# Patient Record
Sex: MALE | Race: WHITE | NOT HISPANIC OR LATINO | Employment: FULL TIME | URBAN - METROPOLITAN AREA
[De-identification: names, ages, dates, MRNs, and addresses within clinical notes are randomized per-mention and may not be internally consistent; named-entity substitution may affect disease eponyms.]

---

## 2017-03-28 ENCOUNTER — NON-PROVIDER VISIT (OUTPATIENT)
Dept: URGENT CARE | Facility: PHYSICIAN GROUP | Age: 32
End: 2017-03-28

## 2017-03-28 DIAGNOSIS — Z02.1 PRE-EMPLOYMENT DRUG SCREENING: ICD-10-CM

## 2017-03-28 LAB
AMP AMPHETAMINE: NORMAL
COC COCAINE: NORMAL
INT CON NEG: NEGATIVE
INT CON POS: POSITIVE
MET METHAMPHETAMINES: NORMAL
OPI OPIATES: NORMAL
PCP PHENCYCLIDINE: NORMAL
POC DRUG COMMENT 753798-OCCUPATIONAL HEALTH: NORMAL
THC: NORMAL

## 2017-03-28 PROCEDURE — 80305 DRUG TEST PRSMV DIR OPT OBS: CPT | Performed by: NURSE PRACTITIONER

## 2017-03-29 NOTE — PROGRESS NOTES
Олегfred Hernandezsergei is a 31 y.o. male here for a non-provider visit for Pre emp UDS    If abnormal was an in office provider notified today (if so, indicate provider)? N/a  Routed to PCP? No

## 2018-05-12 ENCOUNTER — NON-PROVIDER VISIT (OUTPATIENT)
Dept: OCCUPATIONAL MEDICINE | Facility: CLINIC | Age: 33
End: 2018-05-12

## 2018-05-12 ENCOUNTER — HOSPITAL ENCOUNTER (EMERGENCY)
Facility: MEDICAL CENTER | Age: 33
End: 2018-05-12
Attending: EMERGENCY MEDICINE
Payer: COMMERCIAL

## 2018-05-12 VITALS
OXYGEN SATURATION: 98 % | SYSTOLIC BLOOD PRESSURE: 133 MMHG | TEMPERATURE: 99.9 F | DIASTOLIC BLOOD PRESSURE: 81 MMHG | HEIGHT: 68 IN | WEIGHT: 170.19 LBS | RESPIRATION RATE: 18 BRPM | HEART RATE: 107 BPM | BODY MASS INDEX: 25.79 KG/M2

## 2018-05-12 DIAGNOSIS — Z02.1 PRE-EMPLOYMENT DRUG SCREENING: ICD-10-CM

## 2018-05-12 DIAGNOSIS — H10.213 CHEMICAL CONJUNCTIVITIS OF BOTH EYES: ICD-10-CM

## 2018-05-12 DIAGNOSIS — Z02.83 ENCOUNTER FOR DRUG SCREENING: ICD-10-CM

## 2018-05-12 DIAGNOSIS — S05.00XS CORNEAL ABRASION, UNSPECIFIED LATERALITY, SEQUELA: ICD-10-CM

## 2018-05-12 PROCEDURE — 8899 PR URINE 11 PANEL - AFTER HOURS: Performed by: INTERNAL MEDICINE

## 2018-05-12 PROCEDURE — 82075 ASSAY OF BREATH ETHANOL: CPT | Performed by: INTERNAL MEDICINE

## 2018-05-12 PROCEDURE — 99284 EMERGENCY DEPT VISIT MOD MDM: CPT

## 2018-05-12 PROCEDURE — 80305 DRUG TEST PRSMV DIR OPT OBS: CPT | Performed by: INTERNAL MEDICINE

## 2018-05-12 PROCEDURE — 700101 HCHG RX REV CODE 250: Performed by: EMERGENCY MEDICINE

## 2018-05-12 RX ORDER — ERYTHROMYCIN 5 MG/G
OINTMENT OPHTHALMIC ONCE
Status: COMPLETED | OUTPATIENT
Start: 2018-05-12 | End: 2018-05-12

## 2018-05-12 RX ORDER — ERYTHROMYCIN 5 MG/G
1 OINTMENT OPHTHALMIC 3 TIMES DAILY
Qty: 1 TUBE | Refills: 0 | Status: SHIPPED | OUTPATIENT
Start: 2018-05-12 | End: 2018-05-17

## 2018-05-12 RX ORDER — TETRACAINE HYDROCHLORIDE 5 MG/ML
1 SOLUTION OPHTHALMIC ONCE
Status: COMPLETED | OUTPATIENT
Start: 2018-05-12 | End: 2018-05-12

## 2018-05-12 RX ORDER — ESOMEPRAZOLE MAGNESIUM 20 MG
20 CAPSULE,DELAYED RELEASE (ENTERIC COATED) ORAL DAILY
Status: SHIPPED | COMMUNITY
End: 2020-06-03

## 2018-05-12 RX ADMIN — ERYTHROMYCIN: 5 OINTMENT OPHTHALMIC at 16:56

## 2018-05-12 RX ADMIN — FLUORESCEIN SODIUM 1 MG: 1 STRIP OPHTHALMIC at 15:30

## 2018-05-12 RX ADMIN — TETRACAINE HYDROCHLORIDE 1 DROP: 5 SOLUTION OPHTHALMIC at 15:30

## 2018-05-12 ASSESSMENT — LIFESTYLE VARIABLES
TOTAL SCORE: 0
DO YOU DRINK ALCOHOL: YES
EVER HAD A DRINK FIRST THING IN THE MORNING TO STEADY YOUR NERVES TO GET RID OF A HANGOVER: NO
HAVE YOU EVER FELT YOU SHOULD CUT DOWN ON YOUR DRINKING: NO
HAVE PEOPLE ANNOYED YOU BY CRITICIZING YOUR DRINKING: NO
EVER FELT BAD OR GUILTY ABOUT YOUR DRINKING: NO
CONSUMPTION TOTAL: INCOMPLETE
TOTAL SCORE: 0
TOTAL SCORE: 0

## 2018-05-12 ASSESSMENT — ENCOUNTER SYMPTOMS
CHILLS: 0
DIZZINESS: 0
WHEEZING: 0
FEVER: 0
SHORTNESS OF BREATH: 0
COUGH: 0
HEADACHES: 0
EYE REDNESS: 1

## 2018-05-12 ASSESSMENT — PAIN SCALES - GENERAL: PAINLEVEL_OUTOF10: 3

## 2018-05-12 NOTE — ED PROVIDER NOTES
ED Provider Note   5/12/2018  4:51 PM    Means of Arrival: Walk In  History obtained by: patient  Limitations:     CHIEF COMPLAINT  Chief Complaint   Patient presents with   • Foreign Body in Eye     Left eye       HPI  Олег Ramirez is a 32 y.o. male with history of GERD presents with concerns of a work-related injury.  He says he is working for [employer], wearing eye protection, when a bag of powdery substance got into his eyes.  Exposed to BSH-12 powder at work. Rinsed eyes for 15 minutes with eye irrigation on site. PH of BSH-12 is 7 according to [facility].  He said there was not pain initially but felt like there is a grittiness in his eyes.  Eye exam was in his eyes.  He says now he has discomfort as left eye and it is frequently watering.  He says the vision for the most part is normal except for his eyes watering there is some blurriness.  No headache.  No respiratory symptoms.    REVIEW OF SYSTEMS  Review of Systems   Constitutional: Negative for chills and fever.   Eyes: Positive for redness.        See HPI   Respiratory: Negative for cough, shortness of breath and wheezing.    Skin: Negative for rash.   Neurological: Negative for dizziness and headaches.     See HPI for further details.     PAST MEDICAL HISTORY   has a past medical history of GERD (gastroesophageal reflux disease).    SOCIAL HISTORY  Social History     Social History Main Topics   • Smoking status: Current Every Day Smoker     Packs/day: 0.50     Types: Cigarettes   • Smokeless tobacco: Never Used   • Alcohol use Yes      Comment: occasionally   • Drug use: No   • Sexual activity: Not on file       SURGICAL HISTORY  patient denies any surgical history    CURRENT MEDICATIONS  Home Medications     Reviewed by Bahman Gonzalez (Pharmacy Tech) on 05/12/18 at 1623  Med List Status: Complete   Medication Last Dose Status   esomeprazole (NEXIUM) 20 MG capsule 5/10/2018 Active                ALLERGIES  No Known Allergies    PHYSICAL EXAM  VITAL  "SIGNS: /81   Pulse (!) 107   Temp 37.7 °C (99.9 °F)   Resp 18   Ht 1.727 m (5' 8\")   Wt 77.2 kg (170 lb 3.1 oz)   SpO2 98%   BMI 25.88 kg/m²    Pulse ox interpretation: I interpret this pulse ox as normal.  Constitutional: Alert in no apparent distress.  HENT: Normocephalic, Atraumatic, Bilateral external ears normal. Nose normal.   Eyes: Pupils are equal.  Injected conjunctivae bilaterally more so at the left medial eye.R20/30, L20/50.  Slit lamp reveals multiple punctate areas of fluorescein uptake mostly at sclera.  No large areas to suggest ulceration.  Normal eye movements.  No chemosis. PH of eyes = 7.   Heart: Increased rate and rythm, no murmurs.    Lungs: No respiratory distress, regular respirations. Clear to auscultation bilaterally.  Abdomen: Normal appearance, nondistended, nontender.  Skin: Warm, Dry, No erythema, No rash.   Neurologic: Alert, Grossly non-focal. No slurred speech. Moving extremities normally.   Psychiatric: Affect normal, Judgment normal, Mood normal, Appears appropriate and not intoxicated.   Physical Exam      COURSE & MEDICAL DECISION MAKING  Pertinent Labs & Imaging studies reviewed. (See chart for details)    4:51 PM This is an emergent evaluation of a 32 y.o., male who presents with eye pain after exposure to industrial substance at work.  Physical exam significant for bilateral injection.  There is punctate fluorescein uptake bilateral eyes worse at the left than the right.  Differential diagnosis includes abrasion versus chemical conjunctivitis.  Acceptable visual acuity.  No cell or flare.  Erythromycin ointment was applied to eyes.  I also prescribed this to him to take for the next 3 days.  He is provided with information to follow up with occupational health and ophthalmology clinic..      The patient will return for worsening symptoms and is stable at the time of discharge. The patient verbalizes understanding.    FINAL IMPRESSION  1. Chemical conjunctivitis " of both eyes    2. Corneal abrasion, unspecified laterality, sequela               Electronically signed by: Krishan Bell II, 5/12/2018 4:51 PM

## 2018-05-12 NOTE — DISCHARGE INSTRUCTIONS
Corneal Abrasion  The cornea is the clear covering at the front and center of the eye. When looking at the colored portion of the eye (iris), you are looking through the cornea. This very thin tissue is made up of many layers. The surface layer is a single layer of cells (corneal epithelium) and is one of the most sensitive tissues in the body. If a scratch or injury causes the corneal epithelium to come off, it is called a corneal abrasion. If the injury extends to the tissues below the epithelium, the condition is called a corneal ulcer.  CAUSES   · Scratches.  · Trauma.  · Foreign body in the eye.  Some people have recurrences of abrasions in the area of the original injury even after it has healed (recurrent erosion syndrome). Recurrent erosion syndrome generally improves and goes away with time.  SYMPTOMS   · Eye pain.  · Difficulty or inability to keep the injured eye open.  · The eye becomes very sensitive to light.  · Recurrent erosions tend to happen suddenly, first thing in the morning, usually after waking up and opening the eye.  DIAGNOSIS   Your health care provider can diagnose a corneal abrasion during an eye exam. Dye is usually placed in the eye using a drop or a small paper strip moistened by your tears. When the eye is examined with a special light, the abrasion shows up clearly because of the dye.  TREATMENT   · Small abrasions may be treated with antibiotic drops or ointment alone.  · A pressure patch may be put over the eye. If this is done, follow your doctor's instructions for when to remove the patch. Do not drive or use machines while the eye patch is on. Judging distances is hard to do with a patch on.  If the abrasion becomes infected and spreads to the deeper tissues of the cornea, a corneal ulcer can result. This is serious because it can cause corneal scarring. Corneal scars interfere with light passing through the cornea and cause a loss of vision in the involved eye.  HOME CARE  INSTRUCTIONS  · Use medicine or ointment as directed. Only take over-the-counter or prescription medicines for pain, discomfort, or fever as directed by your health care provider.  · Do not drive or operate machinery if your eye is patched. Your ability to  distances is impaired.  · If your health care provider has given you a follow-up appointment, it is very important to keep that appointment. Not keeping the appointment could result in a severe eye infection or permanent loss of vision. If there is any problem keeping the appointment, let your health care provider know.  SEEK MEDICAL CARE IF:   · You have pain, light sensitivity, and a scratchy feeling in one eye or both eyes.  · Your pressure patch keeps loosening up, and you can blink your eye under the patch after treatment.  · Any kind of discharge develops from the eye after treatment or if the lids stick together in the morning.  · You have the same symptoms in the morning as you did with the original abrasion days, weeks, or months after the abrasion healed.  This information is not intended to replace advice given to you by your health care provider. Make sure you discuss any questions you have with your health care provider.  Document Released: 12/15/2001 Document Revised: 09/07/2016 Document Reviewed: 08/25/2014  [employer] Interactive Patient Education © 2017 [employer]  Chemical Conjunctivitis  Chemical conjunctivitis is eye inflammation from exposure to an irritant or chemical substance. This causes the clear membrane that covers the white part of your eye and the inner surface of your eyelid (conjunctiva) to become inflamed. When the blood vessels in the conjunctiva become inflamed, the eye may become red, pink, and itchy.  Chemical conjunctivitis can occur in one or both eyes. It cannot be spread by one person to another person (noncontagious).  CAUSES  This condition is caused by exposure to a chemical substance or irritant, such  as:  · Smoke.  · Chlorine.  · Soap.  · Fumes.  · Air pollution.  RISK FACTORS  This condition is more likely to develop in:  · People who live somewhere with high levels of air pollution.  · People who use swimming pools often.  SYMPTOMS  Symptoms of this condition may include:  · Eye redness.  · Tearing of the eyes.  · Watery eyes.  · Itchy eyes.  · Burning feeling in the eyes.  · Clear drainage from the eyes.  · Swollen eyelids.  · Sensitivity to light.  DIAGNOSIS  Your health care provider can diagnose this condition from your symptoms and medical history. The health care provider will also do a physical exam. If you have drainage from your eyes, it may be tested to rule out other causes of conjunctivitis. Your health care provider may also use a medical instrument that uses magnified light to examine the eyes (slit lamp).   TREATMENT  Treatment for this condition involves carefully flushing the chemical out of your eye. You may also get antiallergy medicines or eye drops to use at home.  HOME CARE INSTRUCTIONS  · Take or apply medicines only as directed by your health care provider.  · Do not touch or rub your eyes.  · Do not wear contact lenses until the inflammation is gone. Wear glasses instead.  · Do not wear eye makeup until the inflammation is gone.  · Apply a cool, clean washcloth to your eye for 10-20 minutes, 3-4 times a day.  · Avoid exposure to the chemical or environment that caused the irritation. Wear eye protection as necessary.  SEEK MEDICAL CARE IF:  · Your symptoms get worse.  · You have pus draining from your eye.  · You have new symptoms.  · You have a fever.  · You have a change in vision.  · You have increasing pain.  This information is not intended to replace advice given to you by your health care provider. Make sure you discuss any questions you have with your health care provider.  Document Released: 09/27/2006 Document Revised: 01/08/2016 Document Reviewed: 09/29/2015  [employer]  Interactive Patient Education © 2017 [employer]

## 2018-05-12 NOTE — ED NOTES
"Pt was working at [employer] about 11am. Had a respirator, safety goggles on. Was emptying a \"powder substance,\" and it splashed in L eye. Pt washed L eye out at eye wash station for 15 minutes prior to arrival. Left eye is red. Pt c/o pain.   "

## 2018-05-13 NOTE — ED NOTES
Discharge information reviewed in detail. Pt verbalized understanding of discharge instructions to follow up with PCP and to return to ER if condition worsens.  Patient educated on one new prescription.   Pt ambulated out of ER in a steady gait.

## 2018-05-24 LAB
AMP AMPHETAMINE: NORMAL
BAR BARBITURATES: NORMAL
BREATH ALCOHOL COMMENT: NORMAL
BZO BENZODIAZEPINES: NORMAL
COC COCAINE: NORMAL
INT CON NEG: NEGATIVE
INT CON POS: POSITIVE
MDMA ECSTASY: NORMAL
MET METHAMPHETAMINES: NORMAL
MTD METHADONE: NORMAL
OPI OPIATES: NORMAL
OXY OXYCODONE: NORMAL
PCP PHENCYCLIDINE: NORMAL
POC BREATHALIZER: 0 PERCENT (ref 0–0.01)
POC URINE DRUG SCREEN OCDRS: NORMAL
THC: NORMAL

## 2018-09-01 ENCOUNTER — NON-PROVIDER VISIT (OUTPATIENT)
Dept: URGENT CARE | Facility: PHYSICIAN GROUP | Age: 33
End: 2018-09-01
Payer: COMMERCIAL

## 2018-09-01 ENCOUNTER — OCCUPATIONAL MEDICINE (OUTPATIENT)
Dept: URGENT CARE | Facility: PHYSICIAN GROUP | Age: 33
End: 2018-09-01
Payer: COMMERCIAL

## 2018-09-01 VITALS
HEIGHT: 69 IN | DIASTOLIC BLOOD PRESSURE: 90 MMHG | OXYGEN SATURATION: 97 % | SYSTOLIC BLOOD PRESSURE: 132 MMHG | BODY MASS INDEX: 25.18 KG/M2 | WEIGHT: 170 LBS | HEART RATE: 121 BPM | TEMPERATURE: 98.6 F
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
## 2018-09-01 DIAGNOSIS — Z02.1 PRE-EMPLOYMENT DRUG SCREENING: ICD-10-CM

## 2018-09-01 DIAGNOSIS — S29.019A STRAIN OF THORACIC REGION, INITIAL ENCOUNTER: ICD-10-CM

## 2018-09-01 LAB
AMP AMPHETAMINE: NORMAL
BAR BARBITURATES: NORMAL
BREATH ALCOHOL COMMENT: NORMAL
BZO BENZODIAZEPINES: NORMAL
COC COCAINE: NORMAL
INT CON NEG: NEGATIVE
INT CON POS: POSITIVE
MDMA ECSTASY: NORMAL
MET METHAMPHETAMINES: NORMAL
MTD METHADONE: NORMAL
OPI OPIATES: NORMAL
OXY OXYCODONE: NORMAL
PCP PHENCYCLIDINE: NORMAL
POC BREATHALIZER: 0 PERCENT (ref 0–0.01)
POC URINE DRUG SCREEN OCDRS: NEGATIVE
THC: NORMAL

## 2018-09-01 PROCEDURE — 99202 OFFICE O/P NEW SF 15 MIN: CPT | Performed by: EMERGENCY MEDICINE

## 2018-09-01 PROCEDURE — 80305 DRUG TEST PRSMV DIR OPT OBS: CPT | Performed by: PHYSICIAN ASSISTANT

## 2018-09-01 PROCEDURE — 82075 ASSAY OF BREATH ETHANOL: CPT | Performed by: PHYSICIAN ASSISTANT

## 2018-09-01 ASSESSMENT — ENCOUNTER SYMPTOMS
FOCAL WEAKNESS: 0
SENSORY CHANGE: 0
NECK PAIN: 0
FEVER: 0

## 2018-09-01 ASSESSMENT — PAIN SCALES - GENERAL: PAINLEVEL: 3=SLIGHT PAIN

## 2018-09-01 NOTE — PATIENT INSTRUCTIONS
Muscle Strain  A muscle strain (pulled muscle) happens when a muscle is stretched beyond normal length. It happens when a sudden, violent force stretches your muscle too far. Usually, a few of the fibers in your muscle are torn. Muscle strain is common in athletes. Recovery usually takes 1-2 weeks. Complete healing takes 5-6 weeks.  Follow these instructions at home:  · Follow the PRICE method of treatment to help your injury get better. Do this the first 2-3 days after the injury:  ¨ Protect. Protect the muscle to keep it from getting injured again.  ¨ Rest. Limit your activity and rest the injured body part.  ¨ Ice. Put ice in a plastic bag. Place a towel between your skin and the bag. Then, apply the ice and leave it on from 15-20 minutes each hour. After the third day, switch to moist heat packs.  ¨ Compression. Use a splint or elastic bandage on the injured area for comfort. Do not put it on too tightly.  ¨ Elevate. Keep the injured body part above the level of your heart.  · Only take medicine as told by your doctor.  · Warm up before doing exercise to prevent future muscle strains.  Contact a doctor if:  · You have more pain or puffiness (swelling) in the injured area.  · You feel numbness, tingling, or notice a loss of strength in the injured area.  This information is not intended to replace advice given to you by your health care provider. Make sure you discuss any questions you have with your health care provider.  Document Released: 09/26/2009 Document Revised: 05/25/2017 Document Reviewed: 07/17/2014  [employer] Interactive Patient Education © 2017 [employer]

## 2018-09-01 NOTE — LETTER
[facility]  [address]  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    Medical Information   Related to Industrial Injury?      Subjective Complaints:  Date of injury: 2018. Injured at work: yes; onset while lifting today. Previous injury: none. Second job: none. Outside activity: none. Contributing medical illness: chronic intermittent mid backaches. Severity: mild, moderate. Prior treatment: none . Location: right mid back. Radiation: none. Numbness/tingling: none. Focal weakness: none. Bowel/bladder dysfunction: none. Fever: none.   Objective Findings: Gen.: Alert and cooperative, no acute distress. Chest: Nontender, symmetric. Back: Tenderness right greater than left mid thoracic periscapular regions. Neurological: Normal strength and sensation extremities. Vascular: Bilateral radial pulses intact. Skin: Warm, dry, intact.   Pre-Existing Condition(s):     Assessment:   Initial Visit    Status:    Permanent Disability:No    Plan: Medication    Diagnostics:      Comments:       Disability Information   Status: Released to Restricted Duty    From:  2018  Through: 2018 Restrictions are: Temporary   Physical Restrictions   Sitting:    Standing:    [place] hrs/day [place] hrs/day   [place] hrs/day Walking:    Climbing:    Pushing:      Pulling:    Other:    Reaching Above Shoulder (L): < or = to 1 hr/day Reaching Above Shoulder (R): < or = 1 hrs/day     Reaching Below  Shoulder (L):    Reaching Below Shoulder (R):      Not to exceed Weight Limits   Carrying(hrs):   Weight Limit(lb): < or = to 10 pounds Lifting(hrs):   Weight  Limit(lb): < or = to 10 pounds   Comments:      Repetitive Actions   Hands: i.e. Fine Manipulations from Grasping:     Feet: i.e. Operating Foot Controls:     Driving / Operate Machinery:     Physician Name: Martinez Damon M.D. Physician Signature: MARTINEZ Chandler M.D. e-Signature: Dr. Kirill Palacios, Medical Director   Clinic Name / Location: [address] [address] Clinic Phone Number: Dept: [phone]   Appointment Time: 2:50 Pm Visit Start Time: 3:17 PM   Check-In Time:  2:55 Pm Visit Discharge Time:  4:10 PM    Original-Treating Physician or Chiropractor    Page 2-Insurer/TPA    Page 3-Employer    Page 4-Employee

## 2018-09-01 NOTE — PROGRESS NOTES
"Subjective:      Олег Ramirez is a 32 y.o. male who presents with Work-Related Injury (Upper back pain/ sharp pains/ carried more than 60 lbs )      Date of injury: 09/01/2018. Injured at work: yes; onset while lifting today. Previous injury: none. Second job: none. Outside activity: none. Contributing medical illness: chronic intermittent mid backaches. Severity: mild, moderate. Prior treatment: none . Location: right mid back. Radiation: none. Numbness/tingling: none. Focal weakness: none. Bowel/bladder dysfunction: none. Fever: none.     HPI    Review of Systems   Constitutional: Negative for fever.   Musculoskeletal: Negative for neck pain.   Skin: Negative for rash.   Neurological: Negative for sensory change and focal weakness.     PMH:  has a past medical history of GERD (gastroesophageal reflux disease).  MEDS:   Current Outpatient Prescriptions:   •  esomeprazole (NEXIUM) 20 MG capsule, Take 20 mg by mouth every day., Disp: , Rfl:   ALLERGIES: No Known Allergies  SURGHX: No past surgical history on file.  SOCHX:  reports that he has been smoking Cigarettes.  He has been smoking about 0.50 packs per day. He has never used smokeless tobacco. He reports that he drinks alcohol. He reports that he does not use drugs.  FH: family history is not on file.       Objective:     /90   Pulse (!) 121   Temp 37 °C (98.6 °F)   Ht 1.753 m (5' 9\")   Wt 77.1 kg (170 lb)   SpO2 97%   BMI 25.10 kg/m²      Physical Exam    Gen.: Alert and cooperative, no acute distress. Chest: Nontender, symmetric. Back: Tenderness right greater than left mid thoracic periscapular regions. Neurological: Normal strength and sensation extremities. Vascular: Bilateral radial pulses intact. Skin: Warm, dry, intact.       Assessment/Plan:     1. Strain of thoracic region, initial encounter  Ice, heat, OTC ibuprofen prn  D39 and C4 completed.      "

## 2018-09-01 NOTE — LETTER
"EMPLOYEE’S CLAIM FOR COMPENSATION/ REPORT OF INITIAL TREATMENT  FORM C-4    EMPLOYEE’S CLAIM - PROVIDE ALL INFORMATION REQUESTED   First Name  Олег Last Name  James Birthdate                    1985                Sex  male Claim Number   Home Address  [address] Age  32 y.o. Height  1.753 m (5' 9\") Weight  77.1 kg (170 lb) 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 (if applicable)  picking up bags    How did this injury or occupational disease occur? (Be specific an answer in detail. Use additional sheet if necessary)  picking up bags wrong way   If you believe that you have an occupational disease, when did you first have knowledge of the disability and it relationship to your employment?   Witnesses to the Accident        Nature of Injury or Occupational Disease  Workers' Compensation  Part(s) of Body Injured or Affected  Upper Back Area (Thoracic Area), N/A, N/A    I certify that the above is true and correct to the best of my knowledge and that I have provided this information in order to obtain the benefits of " [place]  I hereby authorize any physician, chiropractor, surgeon, practitioner, or other person, any hospital, including [facility] or [facility], any medical service organization, any insurance company, or other institution or organization to release to each other, any medical or other information, including benefits paid or payable, pertinent to this injury or disease, except information relative to diagnosis, treatment and/or counseling for AIDS, psychological conditions, alcohol or controlled substances, for which I must give specific authorization.  A Photostat of this authorization shall be as valid as the original.     Date   Place   Employee’s Signature   THIS REPORT MUST BE COMPLETED AND MAILED WITHIN 3 WORKING DAYS OF TREATMENT   Place  [facility]  Name of Facility  [place]   Date  9/1/2018 Diagnosis  (S29.019A) Strain of thoracic region, initial encounter Is there evidence the injured employee was under the influence of alcohol and/or another controlled substance at the time of accident?   Hour  3:17 PM Description of Injury or Disease  The encounter diagnosis was Strain of thoracic region, initial encounter. No   Treatment  Ice, heat, ibuprofen prn  Have you advised the patient to remain off work five days or more? No   X-Ray Findings      If Yes   From Date  To Date      From information given by the employee, together with medical evidence, can you directly connect this injury or occupational disease as job incurred?  Yes If No Full Duty  No Modified Duty  Yes   Is additional medical care by a physician indicated?  Yes If Modified Duty, Specify any Limitations / Restrictions  No bending, no stooping no crouching, weight lifting restriction, limited overhead activity   Do you know of any previous injury or disease contributing to this condition or occupational  "disease?                            Yes  Comments:chronic intermittent back pains   Date  9/1/2018 Print Doctor’s Name Martinez Damon M.D. I certify the employer’s copy of  this form was mailed on:   Address  [address] Insurer’s Use Only     [facility] Zip  [address]    Provider’s Tax ID Number  [id number] Telephone  Dept: [phone]        elva-MARTINEZ Maldonado M.D.   e-Signature: Dr. Kirill Palacios, Medical Director Degree  MD        ORIGINAL-TREATING PHYSICIAN OR CHIROPRACTOR    PAGE 2-INSURER/TPA    PAGE 3-EMPLOYER    PAGE 4-EMPLOYEE             Form C-4 (rev10/07)              BRIEF DESCRIPTION OF RIGHTS AND BENEFITS  (Pursuant to [place] 616C.[place])    Notice of Injury or Occupational Disease (Incident Report Form C-1): If an injury or occupational disease (OD) arises out of and in the  course of employment, you must provide written notice to your employer as soon as practicable, but no later than 7 days after the accident or  OD. Your employer shall maintain a sufficient supply of the required forms.    Claim for Compensation (Form C-4): If medical treatment is sought, the form C-4 is available at the place of initial treatment. A completed  \"Claim for Compensation\" (Form C-4) must be filed within 90 days after an accident or OD. The treating physician or chiropractor must,  within 3 working days after treatment, complete and mail to the employer, the employer's insurer and third-party , the Claim for  Compensation.    Medical Treatment: If you require medical treatment for your on-the-job injury or OD, you may be required to select a physician or  chiropractor from a list provided by your workers’ compensation insurer, if it has contracted with an Organization for Managed Care (MCO) or  Preferred Provider Organization (PPO) or providers of health care. If your employer has not entered into a contract with an MCO or PPO, you  may select a physician or chiropractor from the Panel of " Physicians and Chiropractors. Any medical costs related to your industrial injury or  OD will be paid by your insurer.    Temporary Total Disability (TTD): If your doctor has certified that you are unable to work for a period of at least 5 consecutive days, or 5  cumulative days in a 20-day period, or places restrictions on you that your employer does not accommodate, you may be entitled to TTD  compensation.    Temporary Partial Disability (TPD): If the wage you receive upon reemployment is less than the compensation for TTD to which you are  entitled, the insurer may be required to pay you TPD compensation to make up the difference. TPD can only be paid for a maximum of 24  months.    Permanent Partial Disability (PPD): When your medical condition is stable and there is an indication of a PPD as a result of your injury or  OD, within 30 days, your insurer must arrange for an evaluation by a rating physician or chiropractor to determine the degree of your PPD. The  amount of your PPD award depends on the date of injury, the results of the PPD evaluation and your age and wage.    Permanent Total Disability (PTD): If you are medically certified by a treating physician or chiropractor as permanently and totally disabled  and have been granted a PTD status by your insurer, you are entitled to receive monthly benefits not to exceed 66 2/3% of your average  monthly wage. The amount of your PTD payments is subject to reduction if you previously received a PPD award.    Vocational Rehabilitation Services: You may be eligible for vocational rehabilitation services if you are unable to return to the job due to a  permanent physical impairment or permanent restrictions as a result of your injury or occupational disease.    Transportation and Per Nolan Reimbursement: You may be eligible for travel expenses and per nolan associated with medical treatment.    Reopening: You may be able to reopen your claim if your condition  worsens after claim closure.    Appeal Process: If you disagree with a written determination issued by the insurer or the insurer does not respond to your request, you may  appeal to the Department of Administration, , by following the instructions contained in your determination letter. You must  appeal the determination within 70 days from the date of the determination letter at [address], [address], [place], [place]  [address], or [address], [place], [place] [address]. If you disagree with the  decision, you may appeal to the  Department of Administration, . You must file your appeal within 30 days from the date of the  decision  letter at [address], [address], [place], [place] [address], or [address], [place], [place] [address]. If you  disagree with a decision of an , you may file a petition for judicial review with the District Court. You must do so within 30  days of the Appeal Officer’s decision. You may be represented by an  at your own expense or you may contact the [facility] for possible  representation.    [place]  for Injured Workers (NAIW): If you disagree with a  decision, you may request that NAIW represent you  without charge at an  Hearing. For information regarding denial of benefits, you may contact the [facility] at: [address], [address], [place] [address], [phone], or [address], [place] [address], [phone]    To File a Complaint with the Division: If you wish to file a complaint with the  of the Division of Industrial Relations (DIR),  please contact the Workers’ Compensation Section, [address], [place], [place] [address], telephone [phone]) [phone], or  [address], [place] [address], telephone [phone]  [phone].    For assistance with Workers’ Compensation Issues: you may contact the Office of the Governor Consumer Health Assistance, [address] [place] [address], [address], [place] [address], Toll Free 1-523.698.3173, Web site: http://govcha.[facility].nv., E-mail  Makayla@[email or web address].nv.                                                                                                                                                                                                                                   __________________________________________________________________                                                                   _________________                Employee Name / Signature                                                                                                                                                       Date                                                                                                                                                                                                     D-2 (rev. 10/07)

## 2018-09-14 ENCOUNTER — HOSPITAL ENCOUNTER (INPATIENT)
Facility: MEDICAL CENTER | Age: 33
LOS: 1 days | DRG: 340 | End: 2018-09-16
Attending: EMERGENCY MEDICINE | Admitting: SURGERY
Payer: COMMERCIAL

## 2018-09-14 ENCOUNTER — APPOINTMENT (OUTPATIENT)
Dept: RADIOLOGY | Facility: MEDICAL CENTER | Age: 33
DRG: 340 | End: 2018-09-14
Attending: EMERGENCY MEDICINE
Payer: COMMERCIAL

## 2018-09-14 DIAGNOSIS — G89.18 POST-OPERATIVE PAIN: ICD-10-CM

## 2018-09-14 DIAGNOSIS — K35.30 ACUTE APPENDICITIS WITH LOCALIZED PERITONITIS: ICD-10-CM

## 2018-09-14 LAB
ALBUMIN SERPL BCP-MCNC: 4.1 G/DL (ref 3.2–4.9)
ALBUMIN/GLOB SERPL: 1.3 G/DL
ALP SERPL-CCNC: 56 U/L (ref 30–99)
ALT SERPL-CCNC: 27 U/L (ref 2–50)
ANION GAP SERPL CALC-SCNC: 11 MMOL/L (ref 0–11.9)
APPEARANCE UR: CLEAR
AST SERPL-CCNC: 26 U/L (ref 12–45)
BASOPHILS # BLD AUTO: 0.3 % (ref 0–1.8)
BASOPHILS # BLD: 0.05 K/UL (ref 0–0.12)
BILIRUB SERPL-MCNC: 0.9 MG/DL (ref 0.1–1.5)
BILIRUB UR QL STRIP.AUTO: NEGATIVE
BUN SERPL-MCNC: 11 MG/DL (ref 8–22)
CALCIUM SERPL-MCNC: 9.6 MG/DL (ref 8.4–10.2)
CHLORIDE SERPL-SCNC: 103 MMOL/L (ref 96–112)
CO2 SERPL-SCNC: 26 MMOL/L (ref 20–33)
COLOR UR: YELLOW
CREAT SERPL-MCNC: 1.05 MG/DL (ref 0.5–1.4)
EOSINOPHIL # BLD AUTO: 0.28 K/UL (ref 0–0.51)
EOSINOPHIL NFR BLD: 1.8 % (ref 0–6.9)
ERYTHROCYTE [DISTWIDTH] IN BLOOD BY AUTOMATED COUNT: 41.3 FL (ref 35.9–50)
GLOBULIN SER CALC-MCNC: 3.1 G/DL (ref 1.9–3.5)
GLUCOSE SERPL-MCNC: 145 MG/DL (ref 65–99)
GLUCOSE UR STRIP.AUTO-MCNC: NEGATIVE MG/DL
HCT VFR BLD AUTO: 49.5 % (ref 42–52)
HGB BLD-MCNC: 16.1 G/DL (ref 14–18)
IMM GRANULOCYTES # BLD AUTO: 0.04 K/UL (ref 0–0.11)
IMM GRANULOCYTES NFR BLD AUTO: 0.3 % (ref 0–0.9)
KETONES UR STRIP.AUTO-MCNC: NEGATIVE MG/DL
LEUKOCYTE ESTERASE UR QL STRIP.AUTO: NEGATIVE
LIPASE SERPL-CCNC: 21 U/L (ref 7–58)
LYMPHOCYTES # BLD AUTO: 2.15 K/UL (ref 1–4.8)
LYMPHOCYTES NFR BLD: 14 % (ref 22–41)
MCH RBC QN AUTO: 27.5 PG (ref 27–33)
MCHC RBC AUTO-ENTMCNC: 32.5 G/DL (ref 33.7–35.3)
MCV RBC AUTO: 84.6 FL (ref 81.4–97.8)
MICRO URNS: NORMAL
MONOCYTES # BLD AUTO: 1.28 K/UL (ref 0–0.85)
MONOCYTES NFR BLD AUTO: 8.4 % (ref 0–13.4)
NEUTROPHILS # BLD AUTO: 11.51 K/UL (ref 1.82–7.42)
NEUTROPHILS NFR BLD: 75.2 % (ref 44–72)
NITRITE UR QL STRIP.AUTO: NEGATIVE
NRBC # BLD AUTO: 0 K/UL
NRBC BLD-RTO: 0 /100 WBC
PH UR STRIP.AUTO: 5.5 [PH]
PLATELET # BLD AUTO: 294 K/UL (ref 164–446)
PMV BLD AUTO: 9.8 FL (ref 9–12.9)
POTASSIUM SERPL-SCNC: 3.6 MMOL/L (ref 3.6–5.5)
PROT SERPL-MCNC: 7.2 G/DL (ref 6–8.2)
PROT UR QL STRIP: NEGATIVE MG/DL
RBC # BLD AUTO: 5.85 M/UL (ref 4.7–6.1)
RBC UR QL AUTO: NEGATIVE
SODIUM SERPL-SCNC: 140 MMOL/L (ref 135–145)
SP GR UR STRIP.AUTO: <=1.005
WBC # BLD AUTO: 15.3 K/UL (ref 4.8–10.8)

## 2018-09-14 PROCEDURE — 700101 HCHG RX REV CODE 250: Performed by: EMERGENCY MEDICINE

## 2018-09-14 PROCEDURE — 96375 TX/PRO/DX INJ NEW DRUG ADDON: CPT

## 2018-09-14 PROCEDURE — 0DTJ4ZZ RESECTION OF APPENDIX, PERCUTANEOUS ENDOSCOPIC APPROACH: ICD-10-PCS | Performed by: SURGERY

## 2018-09-14 PROCEDURE — 500371 HCHG DRAIN, BLAKE 10MM: Performed by: SURGERY

## 2018-09-14 PROCEDURE — 700101 HCHG RX REV CODE 250

## 2018-09-14 PROCEDURE — 700111 HCHG RX REV CODE 636 W/ 250 OVERRIDE (IP)

## 2018-09-14 PROCEDURE — 700105 HCHG RX REV CODE 258: Performed by: EMERGENCY MEDICINE

## 2018-09-14 PROCEDURE — 501399 HCHG SPECIMAN BAG, ENDO CATC: Performed by: SURGERY

## 2018-09-14 PROCEDURE — 160048 HCHG OR STATISTICAL LEVEL 1-5: Performed by: SURGERY

## 2018-09-14 PROCEDURE — 501450 HCHG STAPLES, ENDO MULTIFIRE: Performed by: SURGERY

## 2018-09-14 PROCEDURE — 500002 HCHG ADHESIVE, DERMABOND: Performed by: SURGERY

## 2018-09-14 PROCEDURE — 501570 HCHG TROCAR, SEPARATOR: Performed by: SURGERY

## 2018-09-14 PROCEDURE — 500868 HCHG NEEDLE, SURGI(VARES): Performed by: SURGERY

## 2018-09-14 PROCEDURE — 500389 HCHG DRAIN, RESERVOIR SUCT JP 100CC: Performed by: SURGERY

## 2018-09-14 PROCEDURE — 99291 CRITICAL CARE FIRST HOUR: CPT

## 2018-09-14 PROCEDURE — 160028 HCHG SURGERY MINUTES - 1ST 30 MINS LEVEL 3: Performed by: SURGERY

## 2018-09-14 PROCEDURE — 502571 HCHG PACK, LAP CHOLE: Performed by: SURGERY

## 2018-09-14 PROCEDURE — 160002 HCHG RECOVERY MINUTES (STAT): Performed by: SURGERY

## 2018-09-14 PROCEDURE — 88304 TISSUE EXAM BY PATHOLOGIST: CPT

## 2018-09-14 PROCEDURE — 160009 HCHG ANES TIME/MIN: Performed by: SURGERY

## 2018-09-14 PROCEDURE — 501571 HCHG TROCAR, SEPARATOR 12X100: Performed by: SURGERY

## 2018-09-14 PROCEDURE — 501838 HCHG SUTURE GENERAL: Performed by: SURGERY

## 2018-09-14 PROCEDURE — 501568 HCHG TROCAR, BLUNTPORT 12MM: Performed by: SURGERY

## 2018-09-14 PROCEDURE — 700117 HCHG RX CONTRAST REV CODE 255: Performed by: EMERGENCY MEDICINE

## 2018-09-14 PROCEDURE — 96367 TX/PROPH/DG ADDL SEQ IV INF: CPT

## 2018-09-14 PROCEDURE — 80053 COMPREHEN METABOLIC PANEL: CPT

## 2018-09-14 PROCEDURE — 500800 HCHG LAPAROSCOPIC J/L HOOK: Performed by: SURGERY

## 2018-09-14 PROCEDURE — 74177 CT ABD & PELVIS W/CONTRAST: CPT

## 2018-09-14 PROCEDURE — 81003 URINALYSIS AUTO W/O SCOPE: CPT

## 2018-09-14 PROCEDURE — 85025 COMPLETE CBC W/AUTO DIFF WBC: CPT

## 2018-09-14 PROCEDURE — 83690 ASSAY OF LIPASE: CPT

## 2018-09-14 PROCEDURE — 96365 THER/PROPH/DIAG IV INF INIT: CPT

## 2018-09-14 PROCEDURE — 501583 HCHG TROCAR, THRD CAN&SEAL 5X100: Performed by: SURGERY

## 2018-09-14 PROCEDURE — 700111 HCHG RX REV CODE 636 W/ 250 OVERRIDE (IP): Performed by: EMERGENCY MEDICINE

## 2018-09-14 PROCEDURE — 160039 HCHG SURGERY MINUTES - EA ADDL 1 MIN LEVEL 3: Performed by: SURGERY

## 2018-09-14 PROCEDURE — 160035 HCHG PACU - 1ST 60 MINS PHASE I: Performed by: SURGERY

## 2018-09-14 PROCEDURE — 36415 COLL VENOUS BLD VENIPUNCTURE: CPT

## 2018-09-14 RX ORDER — ONDANSETRON 2 MG/ML
4 INJECTION INTRAMUSCULAR; INTRAVENOUS ONCE
Status: COMPLETED | OUTPATIENT
Start: 2018-09-14 | End: 2018-09-14

## 2018-09-14 RX ORDER — MORPHINE SULFATE 10 MG/ML
6 INJECTION, SOLUTION INTRAMUSCULAR; INTRAVENOUS ONCE
Status: COMPLETED | OUTPATIENT
Start: 2018-09-14 | End: 2018-09-14

## 2018-09-14 RX ORDER — SODIUM CHLORIDE 9 MG/ML
1000 INJECTION, SOLUTION INTRAVENOUS ONCE
Status: COMPLETED | OUTPATIENT
Start: 2018-09-14 | End: 2018-09-14

## 2018-09-14 RX ORDER — BUPIVACAINE HYDROCHLORIDE AND EPINEPHRINE 5; 5 MG/ML; UG/ML
INJECTION, SOLUTION PERINEURAL
Status: DISCONTINUED | OUTPATIENT
Start: 2018-09-14 | End: 2018-09-15 | Stop reason: HOSPADM

## 2018-09-14 RX ORDER — KETOROLAC TROMETHAMINE 30 MG/ML
30 INJECTION, SOLUTION INTRAMUSCULAR; INTRAVENOUS ONCE
Status: COMPLETED | OUTPATIENT
Start: 2018-09-14 | End: 2018-09-14

## 2018-09-14 RX ADMIN — SODIUM CHLORIDE 1000 ML: 9 INJECTION, SOLUTION INTRAVENOUS at 20:49

## 2018-09-14 RX ADMIN — KETOROLAC TROMETHAMINE 30 MG: 30 INJECTION, SOLUTION INTRAMUSCULAR at 20:49

## 2018-09-14 RX ADMIN — METRONIDAZOLE 500 MG: 500 INJECTION, SOLUTION INTRAVENOUS at 22:22

## 2018-09-14 RX ADMIN — MORPHINE SULFATE 6 MG: 10 INJECTION INTRAVENOUS at 20:49

## 2018-09-14 RX ADMIN — ONDANSETRON 4 MG: 2 INJECTION INTRAMUSCULAR; INTRAVENOUS at 20:49

## 2018-09-14 RX ADMIN — CEFTRIAXONE 1 G: 1 INJECTION, POWDER, FOR SOLUTION INTRAMUSCULAR; INTRAVENOUS at 21:53

## 2018-09-14 RX ADMIN — IOHEXOL 100 ML: 350 INJECTION, SOLUTION INTRAVENOUS at 21:04

## 2018-09-14 ASSESSMENT — ENCOUNTER SYMPTOMS
BACK PAIN: 0
EYE REDNESS: 0
SEIZURES: 0
BLURRED VISION: 0
NAUSEA: 1
SHORTNESS OF BREATH: 0
CHILLS: 0
SORE THROAT: 0
ABDOMINAL PAIN: 1
HEADACHES: 0
COUGH: 0
VOMITING: 0
NECK PAIN: 0
FEVER: 0
FOCAL WEAKNESS: 0

## 2018-09-14 ASSESSMENT — PAIN SCALES - GENERAL
PAINLEVEL_OUTOF10: 6
PAINLEVEL_OUTOF10: 3
PAINLEVEL_OUTOF10: 0

## 2018-09-15 LAB — PATHOLOGY CONSULT NOTE: NORMAL

## 2018-09-15 PROCEDURE — 770006 HCHG ROOM/CARE - MED/SURG/GYN SEMI*

## 2018-09-15 PROCEDURE — 700105 HCHG RX REV CODE 258: Performed by: SURGERY

## 2018-09-15 PROCEDURE — 700102 HCHG RX REV CODE 250 W/ 637 OVERRIDE(OP): Performed by: SURGERY

## 2018-09-15 PROCEDURE — 700111 HCHG RX REV CODE 636 W/ 250 OVERRIDE (IP)

## 2018-09-15 PROCEDURE — 700111 HCHG RX REV CODE 636 W/ 250 OVERRIDE (IP): Performed by: SURGERY

## 2018-09-15 PROCEDURE — 700101 HCHG RX REV CODE 250

## 2018-09-15 PROCEDURE — A9270 NON-COVERED ITEM OR SERVICE: HCPCS | Performed by: SURGERY

## 2018-09-15 RX ORDER — IBUPROFEN 400 MG/1
800 TABLET ORAL
Status: DISCONTINUED | OUTPATIENT
Start: 2018-09-15 | End: 2018-09-16 | Stop reason: HOSPADM

## 2018-09-15 RX ORDER — OXYCODONE HYDROCHLORIDE 10 MG/1
10 TABLET ORAL
Status: DISCONTINUED | OUTPATIENT
Start: 2018-09-15 | End: 2018-09-16 | Stop reason: HOSPADM

## 2018-09-15 RX ORDER — CALCIUM CARBONATE 500 MG/1
500 TABLET, CHEWABLE ORAL
Status: DISCONTINUED | OUTPATIENT
Start: 2018-09-15 | End: 2018-09-16 | Stop reason: HOSPADM

## 2018-09-15 RX ORDER — SCOLOPAMINE TRANSDERMAL SYSTEM 1 MG/1
1 PATCH, EXTENDED RELEASE TRANSDERMAL
Status: DISCONTINUED | OUTPATIENT
Start: 2018-09-15 | End: 2018-09-16 | Stop reason: HOSPADM

## 2018-09-15 RX ORDER — OMEPRAZOLE 20 MG/1
CAPSULE, DELAYED RELEASE ORAL
Status: ACTIVE
Start: 2018-09-15 | End: 2018-09-15

## 2018-09-15 RX ORDER — ONDANSETRON 2 MG/ML
4 INJECTION INTRAMUSCULAR; INTRAVENOUS EVERY 4 HOURS PRN
Status: DISCONTINUED | OUTPATIENT
Start: 2018-09-15 | End: 2018-09-16 | Stop reason: HOSPADM

## 2018-09-15 RX ORDER — OMEPRAZOLE 20 MG/1
20 CAPSULE, DELAYED RELEASE ORAL DAILY
Status: DISCONTINUED | OUTPATIENT
Start: 2018-09-15 | End: 2018-09-16 | Stop reason: HOSPADM

## 2018-09-15 RX ORDER — OXYCODONE HYDROCHLORIDE 5 MG/1
5 TABLET ORAL
Status: DISCONTINUED | OUTPATIENT
Start: 2018-09-15 | End: 2018-09-16 | Stop reason: HOSPADM

## 2018-09-15 RX ORDER — HALOPERIDOL 5 MG/ML
1 INJECTION INTRAMUSCULAR EVERY 6 HOURS PRN
Status: DISCONTINUED | OUTPATIENT
Start: 2018-09-15 | End: 2018-09-16 | Stop reason: HOSPADM

## 2018-09-15 RX ORDER — DIPHENHYDRAMINE HYDROCHLORIDE 50 MG/ML
25 INJECTION INTRAMUSCULAR; INTRAVENOUS EVERY 6 HOURS PRN
Status: DISCONTINUED | OUTPATIENT
Start: 2018-09-15 | End: 2018-09-16 | Stop reason: HOSPADM

## 2018-09-15 RX ORDER — DEXAMETHASONE SODIUM PHOSPHATE 4 MG/ML
4 INJECTION, SOLUTION INTRA-ARTICULAR; INTRALESIONAL; INTRAMUSCULAR; INTRAVENOUS; SOFT TISSUE
Status: DISCONTINUED | OUTPATIENT
Start: 2018-09-15 | End: 2018-09-16 | Stop reason: HOSPADM

## 2018-09-15 RX ORDER — ACETAMINOPHEN 500 MG
1000 TABLET ORAL EVERY 6 HOURS
Status: DISCONTINUED | OUTPATIENT
Start: 2018-09-15 | End: 2018-09-16 | Stop reason: HOSPADM

## 2018-09-15 RX ADMIN — OXYCODONE HYDROCHLORIDE 5 MG: 5 TABLET ORAL at 01:11

## 2018-09-15 RX ADMIN — ACETAMINOPHEN 1000 MG: 500 TABLET, FILM COATED ORAL at 18:04

## 2018-09-15 RX ADMIN — CEFTRIAXONE SODIUM 2 G: 2 INJECTION, POWDER, FOR SOLUTION INTRAMUSCULAR; INTRAVENOUS at 01:11

## 2018-09-15 RX ADMIN — OXYCODONE HYDROCHLORIDE 10 MG: 10 TABLET ORAL at 12:53

## 2018-09-15 RX ADMIN — ACETAMINOPHEN 1000 MG: 500 TABLET, FILM COATED ORAL at 12:53

## 2018-09-15 RX ADMIN — OXYCODONE HYDROCHLORIDE 10 MG: 10 TABLET ORAL at 18:04

## 2018-09-15 RX ADMIN — OMEPRAZOLE 20 MG: 20 CAPSULE, DELAYED RELEASE ORAL at 05:46

## 2018-09-15 RX ADMIN — OXYCODONE HYDROCHLORIDE 10 MG: 10 TABLET ORAL at 09:57

## 2018-09-15 RX ADMIN — ACETAMINOPHEN 1000 MG: 500 TABLET, FILM COATED ORAL at 01:11

## 2018-09-15 RX ADMIN — ACETAMINOPHEN 1000 MG: 500 TABLET, FILM COATED ORAL at 05:45

## 2018-09-15 RX ADMIN — OXYCODONE HYDROCHLORIDE 10 MG: 10 TABLET ORAL at 06:01

## 2018-09-15 ASSESSMENT — LIFESTYLE VARIABLES
ON A TYPICAL DAY WHEN YOU DRINK ALCOHOL HOW MANY DRINKS DO YOU HAVE: 0
TOTAL SCORE: 0
CONSUMPTION TOTAL: NEGATIVE
TOTAL SCORE: 0
HAVE PEOPLE ANNOYED YOU BY CRITICIZING YOUR DRINKING: NO
HOW MANY TIMES IN THE PAST YEAR HAVE YOU HAD 5 OR MORE DRINKS IN A DAY: 0
HAVE YOU EVER FELT YOU SHOULD CUT DOWN ON YOUR DRINKING: NO
EVER_SMOKED: YES
EVER HAD A DRINK FIRST THING IN THE MORNING TO STEADY YOUR NERVES TO GET RID OF A HANGOVER: NO
EVER FELT BAD OR GUILTY ABOUT YOUR DRINKING: NO
EVER_SMOKED: YES
ALCOHOL_USE: YES
TOTAL SCORE: 0
AVERAGE NUMBER OF DAYS PER WEEK YOU HAVE A DRINK CONTAINING ALCOHOL: 0

## 2018-09-15 ASSESSMENT — PAIN SCALES - GENERAL
PAINLEVEL_OUTOF10: 0
PAINLEVEL_OUTOF10: 5
PAINLEVEL_OUTOF10: 5
PAINLEVEL_OUTOF10: 0
PAINLEVEL_OUTOF10: 2
PAINLEVEL_OUTOF10: 0
PAINLEVEL_OUTOF10: 8
PAINLEVEL_OUTOF10: 6

## 2018-09-15 ASSESSMENT — COPD QUESTIONNAIRES
DURING THE PAST 4 WEEKS HOW MUCH DID YOU FEEL SHORT OF BREATH: NONE/LITTLE OF THE TIME
IN THE PAST 12 MONTHS DO YOU DO LESS THAN YOU USED TO BECAUSE OF YOUR BREATHING PROBLEMS: DISAGREE/UNSURE
COPD SCREENING SCORE: 2
HAVE YOU SMOKED AT LEAST 100 CIGARETTES IN YOUR ENTIRE LIFE: YES
DO YOU EVER COUGH UP ANY MUCUS OR PHLEGM?: NO/ONLY WITH OCCASIONAL COLDS OR INFECTIONS

## 2018-09-15 ASSESSMENT — PATIENT HEALTH QUESTIONNAIRE - PHQ9
1. LITTLE INTEREST OR PLEASURE IN DOING THINGS: NOT AT ALL
2. FEELING DOWN, DEPRESSED, IRRITABLE, OR HOPELESS: NOT AT ALL
SUM OF ALL RESPONSES TO PHQ9 QUESTIONS 1 AND 2: 0

## 2018-09-15 NOTE — ED NOTES
[facility] fall assessment completed. Pt is High risk for fall. Interventions complete. Pt placed in yellow non slip socks, wrist band placed, green sign on door. Bed locked in low position, call light in place. Personal possessions in place. Personal needs assessed. Charge nurse notified to move pt to a more visible room if available. Safety assessed. Will monitor frequently.  Patient transported to imaging

## 2018-09-15 NOTE — ED NOTES
Patient BiB self for generalized abdominal pain for 2 days. Pain comes and goes and feels like stabbing. Denies vomiting or bowel changes.

## 2018-09-15 NOTE — CONSULTS
"General Surgery Consult    CHIEF COMPLAINT: Abdominal pain.     HISTORY OF PRESENT ILLNESS: The patient is a 32 y.o. male, who presents with abdominal pain.  He has had approximately 2 days of worsening abdominal pain.  It became so severe that he presented to the emergency department.  In the emergency department he underwent a workup to include a CT scan concerning for appendicitis as well as an elevated white blood cell count.  There is an inflammatory change near the cecum in the suprapubic region.  No definitive appendix is seen.  He describes pain that is below the umbilicus and worse with movement.  General surgery was consulted for evaluation and management of this problem.     PAST MEDICAL HISTORY:  has a past medical history of GERD (gastroesophageal reflux disease).     PAST SURGICAL HISTORY: patient denies any surgical history     ALLERGIES: No Known Allergies     CURRENT MEDICATIONS:   Home Medications    **Home medications have not yet been reviewed for this encounter**         FAMILY HISTORY: No family history on file.     SOCIAL HISTORY:   Social History     Social History Main Topics   • Smoking status: Current Every Day Smoker     Packs/day: 0.50     Types: Cigarettes   • Smokeless tobacco: Never Used   • Alcohol use Yes      Comment: occasionally   • Drug use: No   • Sexual activity: Not on file       REVIEW OF SYSTEMS: Comprehensive review of systems was negative aside from the above HPI.     PHYSICAL EXAMINATION:     GENERAL: The patient is in no acute distress.   VITAL SIGNS: Blood pressure 123/86, pulse (!) 120, temperature 36.8 °C (98.3 °F), resp. rate 18, height 1.753 m (5' 9\"), weight 76.9 kg (169 lb 8.5 oz), SpO2 92 %.  HEAD AND NECK: Demonstrates symmetric, reactive pupils. Extraocular muscles   are intact. Nares and oropharynx are clear.   NECK: Supple. No adenopathy.  CHEST:No respiratory distress.    CARDIOVASCULAR: Tachycardic. The extremities are well perfused.   ABDOMEN: Tender to " palpation just below the umbilicus.  He has localized guarding without evidence of peritonitis.  Thin abdomen.   EXTREMITIES: Examination of the upper and lower extremities demonstrates no cyanosis edema or clubbing.  NEUROLOGIC: Alert & oriented x 3, Normal motor function, Normal sensory function, No focal deficits noted.    LABORATORY VALUES:   Recent Labs      09/14/18 2000   WBC  15.3*   RBC  5.85   HEMOGLOBIN  16.1   HEMATOCRIT  49.5   MCV  84.6   MCH  27.5   MCHC  32.5*   RDW  41.3   PLATELETCT  294   MPV  9.8     Recent Labs      09/14/18 2000   SODIUM  140   POTASSIUM  3.6   CHLORIDE  103   CO2  26   GLUCOSE  145*   BUN  11   CREATININE  1.05   CALCIUM  9.6     Recent Labs      09/14/18 2000   ASTSGOT  26   ALTSGPT  27   TBILIRUBIN  0.9   ALKPHOSPHAT  56   GLOBULIN  3.1            IMAGING:   CT-ABDOMEN-PELVIS WITH   Final Result      Localized inflammatory process in the right anterior pelvic cavity as described above, most likely representing appendicitis with possible poorly demarcated appendiceal abscess. Localized inflammatory bowel disease (eg Crohn's disease), would also be    possible, but is considered less likely. These findings were discussed with LYLE LUO on 9/14/2018 at 2120 hours.          IMPRESSION AND PLAN:     1.  Acute appendicitis.    1.  The patient will be taken to the operating room for a laparoscopic appendectomy. The surgical conduct was explained. Potential complications including but not limited to infection, bleeding, damage to adjacent structures, anesthetic complications were discussed in detail. Questions were elicited and answered to his satisfaction. He understands the rationale for surgery and elects to proceed.  Operative consent signed.  _      ___________________________________   Baljit Mondragon M.D.    DD: 9/14/2018 DT: 10:43 PM

## 2018-09-15 NOTE — PROGRESS NOTES
Surgery  POD#1  C/O pain  Tachycardia improving  Drain sero sanguinous  A/P  1.  Continue abx  2.  Recheck wbc in am  3.  Mobilize  4.  Diet as tolerated.

## 2018-09-15 NOTE — OR SURGEON
Post OP Note    PreOp Diagnosis: Acute appendicitis    PostOp Diagnosis: Severe appendicitis    Procedure(s):  APPENDECTOMY LAPAROSCOPIC - Wound Class: Dirty or Infected    Surgeon(s):  Baljit Mondragon M.D.    Anesthesiologist/Type of Anesthesia:  Anesthesiologist: Luis F Wilson M.D./General    Surgical Staff:  Circulator: Elaine Chowdhury R.N.  Relief Scrub: Ramsey William  Scrub Person: Cierra GISELLA Onisalisa    Specimens removed if any:  Appendix    Estimated Blood Loss: 40 cc s    Findings: Severe inflammation from acute appendicitis    Complications: No complications were noted    Indication: Patient is an otherwise healthy 32-year-old male who presented with abdominal pain and a CT scan consistent with acute appendicitis.  The patient was counseled extensively as to the risks versus benefits of surgery and agreed to proceed fully informed.    Description of the procedure:    The patient was prepped and draped in the standard sterile surgical fashion after induction of general anesthesia.  Appropriate timeout had been performed and antibiotics delivered.  A periumbilical verres insertion was performed and the abdomen was insufflated to 15 mmHg CO2.  A 5 mm Visiport was applied.  A suprapubic 5 mm trocar and a subxiphoid 12 mm trocar placed under direct visualization.    The appendix was located in its right lower quadrant position.  It was dilated and inflamed but not perforated.  A Harmonic scalpel was used to carefully take the mesoappendix down to the base of the appendix.  The appendiceal mesentery was severely inflamed.  A 35 mm vascular load stapler was used to transect the appendix at its base.  I assured that it was the base by following the tenae to the junction of the appendix.  An Endo Catch was used to retrieve the appendix via the subxiphoid incision.    Right lower quadrant was irrigated until clear.  The staple line was intact.  Hemostasis was meticulously achieved.  I did elect to place a 10  Palauan drain due to the severity of the inflammation.  An Endo Close device was used to close the fascia of the subxiphoid trocar.  Monocryl was used for skin edges.  Dermabond was applied as a dressing.  The patient was extubated to recovery in satisfactory condition.      Disposition: The patient will be admitted overnight for IV antibiotics and observation.    9/14/2018 11:59 PM Baljit Mondragon M.D.

## 2018-09-15 NOTE — PROGRESS NOTES
0550 pt got up to the bathroom, assessed gait and mobility.SBA gait is steady. Able to void 320ml. NANCY drained with 30ml. PRN pain medication given. Ensure safety measures in place.

## 2018-09-15 NOTE — CARE PLAN
Problem: Safety  Goal: Will remain free from injury  Outcome: PROGRESSING AS EXPECTED  Assess pt's level of consciousness, instruct pt on how to use his call light. Ensure safety measures are in place, call light must be within pt's reach, has non skid socks on, upper bed rails up and room is well lit free from clutter/spill    Problem: Venous Thromboembolism (VTW)/Deep Vein Thrombosis (DVT) Prevention:  Goal: Patient will participate in Venous Thrombosis (VTE)/Deep Vein Thrombosis (DVT)Prevention Measures    Intervention: Ensure patient wears graduated elastic stockings (RAJINDER hose) and/or SCDs, if ordered, when in bed or chair (Remove at least once per shift for skin check)  Post/op lap appendectomy, explain the importance of wearing SCD's. Pt agreed to put it on and understood explanation.

## 2018-09-15 NOTE — PROGRESS NOTES
2 RN skin check done with RN shelley pt has a NANCY in place, serosanguinous fluid present about 20ml. 2 lap site from laparoscopic appendectomy that looks dry and clean. No drainage observed. No other skin issue observed.

## 2018-09-15 NOTE — DISCHARGE PLANNING
Care Transition Team Assessment  This writer spoke to pt at bedside who reports he will discharge home and has no discharge needs.    Information Source  Orientation : Oriented x 4  Information Given By: Patient  Informant's Name: Олег         Elopement Risk  Legal Hold: No  Ambulatory or Self Mobile in Wheelchair: No-Not an Elopement Risk  Elopement Risk: Not at Risk for Elopement    Interdisciplinary Discharge Planning  Primary Care Physician:  (none- declined assistance to arrange for a PCP)  Lives with - Patient's Self Care Capacity: Parents  Patient or legal guardian wants to designate a caregiver (see row info): No  Support Systems: Parent  Housing / Facility: [address]  Do You Take your Prescribed Medications Regularly: Yes  Able to Return to Previous ADL's: Yes  Mobility Issues: No  Prior Services: None  Patient Expects to be Discharged to:: home  Assistance Needed: No  Durable Medical Equipment: Not Applicable    Discharge Preparedness  What is your plan after discharge?: Home with help  What are your discharge supports?: Parent  Prior Functional Level: Independent with Activities of Daily Living    Functional Assesment  Prior Functional Level: Independent with Activities of Daily Living    Finances  Financial Barriers to Discharge: No  Prescription Coverage: Yes    Vision / Hearing Impairment  Vision Impairment : No  Hearing Impairment : No    Values / Beliefs / Concerns  Values / Beliefs Concerns : No  Special Hospitalization Concerns: lenght of hospital stay    Advance Directive  Advance Directive?: None    Domestic Abuse  Have you ever been the victim of abuse or violence?: No  Physical Abuse or Sexual Abuse: No  Verbal Abuse or Emotional Abuse: No  Possible Abuse Reported to:: Not Applicable    Psychological Assessment  History of Substance Abuse: None  History of Psychiatric Problems: No  Non-compliant with Treatment: No  Newly Diagnosed Illness: Yes    Discharge Risks or Barriers  Discharge  risks or barriers?: No PCP    Anticipated Discharge Information  Anticipated discharge disposition: Home

## 2018-09-15 NOTE — ED PROVIDER NOTES
ED Provider Note    CHIEF COMPLAINT  Chief Complaint   Patient presents with   • Abdominal Pain       HPI  Олег Ramirez is a 32 y.o. male with history of GERD, who presents with abdominal pain. Patient endorses intermittent suprapubic abdominal pain which has been ongoing for the last 2 days. He describes the pain as sharp without significant radiation. He states it is worse with movement.  Reports some associated nausea, no vomiting.  No known fevers. He has had normal bowel movements, no blood in stool.  No associated dysuria, hematuria, testicular pain or swelling.  He has not taken any medications prior to coming in for evaluation. No history of prior abdominal surgeries.        REVIEW OF SYSTEMS  See HPI for further details.   Review of Systems   Constitutional: Negative for chills and fever.   HENT: Negative for sore throat.    Eyes: Negative for blurred vision and redness.   Respiratory: Negative for cough and shortness of breath.    Cardiovascular: Negative for chest pain and leg swelling.   Gastrointestinal: Positive for abdominal pain and nausea. Negative for vomiting.   Genitourinary: Negative for dysuria and urgency.   Musculoskeletal: Negative for back pain and neck pain.   Skin: Negative for rash.   Neurological: Negative for focal weakness, seizures and headaches.   Psychiatric/Behavioral: Negative for suicidal ideas.         PAST MEDICAL HISTORY   has a past medical history of GERD (gastroesophageal reflux disease).    SOCIAL HISTORY  Social History     Social History Main Topics   • Smoking status: Current Every Day Smoker     Packs/day: 0.50     Types: Cigarettes   • Smokeless tobacco: Never Used   • Alcohol use Yes      Comment: occasionally   • Drug use: No   • Sexual activity: Not on file       SURGICAL HISTORY  patient denies any surgical history    CURRENT MEDICATIONS  Home Medications    **Home medications have not yet been reviewed for this encounter**         ALLERGIES  No Known  "Allergies    PHYSICAL EXAM   VITAL SIGNS: /83   Pulse (!) 120   Temp 37.1 °C (98.8 °F)   Resp 20   Ht 1.753 m (5' 9\")   Wt 76.9 kg (169 lb 8.5 oz)   SpO2 91%   BMI 25.04 kg/m²      Physical Exam   Constitutional: He is oriented to person, place, and time and well-developed, well-nourished, and in no distress. No distress.   Uncomfortable however nontoxic appearing   HENT:   Head: Normocephalic and atraumatic.   Eyes: Pupils are equal, round, and reactive to light. Conjunctivae are normal.   Neck: Normal range of motion. Neck supple.   Cardiovascular: Regular rhythm and normal heart sounds.    Tachycardic   Pulmonary/Chest: Effort normal and breath sounds normal. No respiratory distress.   Abdominal: Soft. He exhibits no distension. There is tenderness. There is guarding. There is no rebound.   Diffuse lower abdominal tenderness to palpation with voluntary guarding, no rebound   Musculoskeletal: Normal range of motion. He exhibits no edema or tenderness.   Neurological: He is alert and oriented to person, place, and time.   Moving all extremities spontaneously   Skin: Skin is warm and dry.   Psychiatric: Affect normal.         DIAGNOSTIC STUDIES      LABS  Personally reviewed by me  Labs Reviewed   CBC WITH DIFFERENTIAL - Abnormal; Notable for the following:        Result Value    WBC 15.3 (*)     MCHC 32.5 (*)     Neutrophils-Polys 75.20 (*)     Lymphocytes 14.00 (*)     Neutrophils (Absolute) 11.51 (*)     Monos (Absolute) 1.28 (*)     All other components within normal limits   COMP METABOLIC PANEL - Abnormal; Notable for the following:     Glucose 145 (*)     All other components within normal limits   LIPASE   URINALYSIS,CULTURE IF INDICATED   ESTIMATED GFR   HISTOLOGY REQUEST    Narrative:     a- appendix           RADIOLOGY  Personally reviewed by me  CT-ABDOMEN-PELVIS WITH   Final Result      Localized inflammatory process in the right anterior pelvic cavity as described above, most likely " representing appendicitis with possible poorly demarcated appendiceal abscess. Localized inflammatory bowel disease (eg Crohn's disease), would also be    possible, but is considered less likely. These findings were discussed with LYLE LUO on 9/14/2018 at 2120 hours.            ED COURSE  Vitals:    09/14/18 2206 09/15/18 0011 09/15/18 0025 09/15/18 0040   BP:  145/53 131/89 134/83   Pulse: (!) 120      Resp:  20 16 20   Temp:  36.7 °C (98.1 °F)  37.1 °C (98.8 °F)   SpO2: 92% 94% 91% 91%   Weight:       Height:             Medications administered:  IVF, morphine, Zofran, Toradol, metronidazole, ceftriaxone    Patient was treated with IV fluids for tachycardia and possible dehydration    MEDICAL DECISION MAKING  Otherwise healthy patient presents with 2 day history of lower abdominal pain. He is tachycardic with otherwise reassuring vitals on arrival. Exam not concerning for bowel obstruction or perforation or cholecystitis. Labs demonstrate evidence of significant leukocytosis however no other significant abnormalities. Urinalysis is negative for infection or blood to suggest nephrolithiasis. Imaging demonstrates a localized inflammatory process in the right lower quadrant and no confirmed normal appendix. Discussed this with radiology who believes this is likely acute appendicitis until proven otherwise. Although it may be due to inflammatory bowel disease, he believes that this is less likely.    I discussed the case with Dr. Mondragon, general surgeon on call. He agrees that we should treat this as acute appendicitis. Patient will be given IV antibiotics and taken to the operating room tonight for appendectomy. I have updated the patient on the plan of care and he is agreeable. He is stable at this time for transfer to the OR      IMPRESSION  (K35.3) Acute appendicitis with localized peritonitis    Disposition: Transfer to OR, guarded condition  Results, diagnoses, and treatment options were discussed with  the patient and/or family. Patient verbalized understanding of plan of care.    Patient referred to primary care provider for monitoring and treatment of blood pressure.      Current Discharge Medication List              Electronically signed by: Kaylen Kirby, 9/14/2018 8:32 PM

## 2018-09-15 NOTE — OR NURSING
0011 Pt to PACU from OR via hospital bed, respirations spontaneous and non-labored via room air. Abdominal  surgical sites clean, dry and intact, pt arousable on calling with no complaints of pain or nausea.   0025 Pt reports 1/10 pain and denies nausea.   0040 Pt still denies nausea and reports tolerable 1/10 pain; pt meets criteria for transfer to hospital room.   0043 Report to ANAIS Mix.   0048 Pt transported to hospital room.

## 2018-09-15 NOTE — PROGRESS NOTES
Pt resting in bed. No signs of distress noted. VSS. ABD soft, tender. DSGs CDI. Pain tolerable. Discussed POC. Will monitor.

## 2018-09-15 NOTE — PROGRESS NOTES
0030 Received report from ANAIS Johnson. POC discussed, awaiting pt on floor.    0100 Received pt on floor via gurney, alert and oriented, rated pain at 2/10 when lying in bed and 5 when moving/coughing. Discussed POC such as medication, activity and diet. Encouraged/instruct on how to use IS. Placed SCD in BLE. Ensure safety measures are in place.

## 2018-09-15 NOTE — OR NURSING
Pt pre-op completed in recovery, H&P verified, consent for surgery signed, questions answered, IV checked for patency, pt awaiting surgery.

## 2018-09-15 NOTE — PROGRESS NOTES
"Pt up to recliner chair, however continues to refuse offer for a hallway walk. This Rn has discussed importance of ambulation. He says \"i'll go later.\"   "

## 2018-09-16 VITALS
HEIGHT: 69 IN | DIASTOLIC BLOOD PRESSURE: 51 MMHG | SYSTOLIC BLOOD PRESSURE: 99 MMHG | WEIGHT: 169.53 LBS | TEMPERATURE: 98.1 F | HEART RATE: 106 BPM | OXYGEN SATURATION: 93 % | BODY MASS INDEX: 25.11 KG/M2 | RESPIRATION RATE: 18 BRPM

## 2018-09-16 LAB
ERYTHROCYTE [DISTWIDTH] IN BLOOD BY AUTOMATED COUNT: 42.1 FL (ref 35.9–50)
HCT VFR BLD AUTO: 43 % (ref 42–52)
HGB BLD-MCNC: 13.9 G/DL (ref 14–18)
MCH RBC QN AUTO: 27.9 PG (ref 27–33)
MCHC RBC AUTO-ENTMCNC: 32.3 G/DL (ref 33.7–35.3)
MCV RBC AUTO: 86.2 FL (ref 81.4–97.8)
PLATELET # BLD AUTO: 222 K/UL (ref 164–446)
PMV BLD AUTO: 9.9 FL (ref 9–12.9)
RBC # BLD AUTO: 4.99 M/UL (ref 4.7–6.1)
WBC # BLD AUTO: 11.7 K/UL (ref 4.8–10.8)

## 2018-09-16 PROCEDURE — 36415 COLL VENOUS BLD VENIPUNCTURE: CPT

## 2018-09-16 PROCEDURE — A9270 NON-COVERED ITEM OR SERVICE: HCPCS

## 2018-09-16 PROCEDURE — 700102 HCHG RX REV CODE 250 W/ 637 OVERRIDE(OP): Performed by: SURGERY

## 2018-09-16 PROCEDURE — A9270 NON-COVERED ITEM OR SERVICE: HCPCS | Performed by: SURGERY

## 2018-09-16 PROCEDURE — 700111 HCHG RX REV CODE 636 W/ 250 OVERRIDE (IP): Performed by: SURGERY

## 2018-09-16 PROCEDURE — 85027 COMPLETE CBC AUTOMATED: CPT

## 2018-09-16 PROCEDURE — 700105 HCHG RX REV CODE 258: Performed by: SURGERY

## 2018-09-16 PROCEDURE — 700102 HCHG RX REV CODE 250 W/ 637 OVERRIDE(OP)

## 2018-09-16 RX ORDER — AMOXICILLIN AND CLAVULANATE POTASSIUM 875; 125 MG/1; MG/1
1 TABLET, FILM COATED ORAL 2 TIMES DAILY
Qty: 14 TAB | Refills: 0 | Status: SHIPPED | OUTPATIENT
Start: 2018-09-16 | End: 2020-05-20

## 2018-09-16 RX ORDER — OXYCODONE HYDROCHLORIDE 5 MG/1
5 TABLET ORAL
Qty: 20 TAB | Refills: 0 | Status: SHIPPED | OUTPATIENT
Start: 2018-09-16 | End: 2018-09-20

## 2018-09-16 RX ORDER — OMEPRAZOLE 20 MG/1
CAPSULE, DELAYED RELEASE ORAL
Status: COMPLETED
Start: 2018-09-16 | End: 2018-09-16

## 2018-09-16 RX ADMIN — ACETAMINOPHEN 1000 MG: 500 TABLET, FILM COATED ORAL at 00:15

## 2018-09-16 RX ADMIN — ACETAMINOPHEN 1000 MG: 500 TABLET, FILM COATED ORAL at 05:13

## 2018-09-16 RX ADMIN — OMEPRAZOLE 20 MG: 20 CAPSULE, DELAYED RELEASE ORAL at 05:19

## 2018-09-16 RX ADMIN — CEFTRIAXONE SODIUM 2 G: 2 INJECTION, POWDER, FOR SOLUTION INTRAMUSCULAR; INTRAVENOUS at 05:13

## 2018-09-16 ASSESSMENT — COGNITIVE AND FUNCTIONAL STATUS - GENERAL
DAILY ACTIVITIY SCORE: 24
MOBILITY SCORE: 24
SUGGESTED CMS G CODE MODIFIER DAILY ACTIVITY: CH
SUGGESTED CMS G CODE MODIFIER MOBILITY: CH

## 2018-09-16 ASSESSMENT — PAIN SCALES - GENERAL
PAINLEVEL_OUTOF10: 1
PAINLEVEL_OUTOF10: 1

## 2018-09-16 NOTE — DISCHARGE INSTRUCTIONS
1.   The pain medication is extremely constipating.    Take a stool softener and drink lots of water.     2.   Alternating ice and heat for 30 minutes can greatly reduce your pain.     3.   It's ok to shower on the day after your surgery.   Do not scrub the incisions.     4.   After laparoscopy, it's common to have shoulder pain or pain when you take a deep breath.   If the pain radiates down your arm, call or present to the ER.     5.   Please call for redness or drainage from the wound sites that persists.     6.   Feel free to call with questions at [phone].   If you have paperwork that needs filling out, please contact us at this number.     7.   Follow up with me in 1-2 weeks for your postoperative exam.     8.   Activity restrictions vary according to the surgery.   If it hurts, don't do it.   You may begin light exercise at 7-10 days.     Baljit Mondragon MD FACS   [facility]      Discharge Instructions    Discharged to home by car with relative. Discharged via wheelchair, hospital escort: Yes.  Special equipment needed: Not Applicable    Be sure to schedule a follow-up appointment with your primary care doctor or any specialists as instructed.     Discharge Plan:   Diet Plan: Discussed  Activity Level: Discussed  Smoking Cessation Offered: Patient Refused  Confirmed Follow up Appointment: Patient to Call and Schedule Appointment  Confirmed Symptoms Management: Discussed  Medication Reconciliation Updated: Yes  Influenza Vaccine Indication: Patient Refuses    I understand that a diet low in cholesterol, fat, and sodium is recommended for good health. Unless I have been given specific instructions below for another diet, I accept this instruction as my diet prescription.   Other diet: Regular    Special Instructions: None    · Is patient discharged on Warfarin / Coumadin?   No     Depression / Suicide Risk    As you are discharged from this Critical access hospital facility, it is important to learn how  to keep safe from harming yourself.    Recognize the warning signs:  · Abrupt changes in personality, positive or negative- including increase in energy   · Giving away possessions  · Change in eating patterns- significant weight changes-  positive or negative  · Change in sleeping patterns- unable to sleep or sleeping all the time   · Unwillingness or inability to communicate  · Depression  · Unusual sadness, discouragement and loneliness  · Talk of wanting to die  · Neglect of personal appearance   · Rebelliousness- reckless behavior  · Withdrawal from people/activities they love  · Confusion- inability to concentrate     If you or a loved one observes any of these behaviors or has concerns about self-harm, here's what you can do:  · Talk about it- your feelings and reasons for harming yourself  · Remove any means that you might use to hurt yourself (examples: pills, rope, extension cords, firearm)  · Get professional help from the community (Mental Health, Substance Abuse, psychological counseling)  · Do not be alone:Call your Safe Contact- someone whom you trust who will be there for you.  · Call your local CRISIS HOTLINE 453-5922 or 694-741-8958  · Call your local Children's Mobile Crisis Response Team [place] (502) 723-0913 or www.BuyWithMe  · Call the toll free National Suicide Prevention Hotlines   · National Suicide Prevention Lifeline 563-605-RMCL (5748)  · National Hope Line Network 800-SUICIDE (842-3984)      Laparoscopic Appendectomy, Adult, Care After  Refer to this sheet in the next few weeks. These instructions provide you with information on caring for yourself after your procedure. Your caregiver may also give you more specific instructions. Your treatment has been planned according to current medical practices, but problems sometimes occur. Call your caregiver if you have any problems or questions after your procedure.  HOME CARE INSTRUCTIONS  · Do not drive while taking narcotic pain  medicines.  · Use stool softener if you become constipated from your pain medicines.  · Change your bandages (dressings) as directed.  · Keep your wounds clean and dry. You may wash the wounds gently with soap and water. Gently pat the wounds dry with a clean towel.  · Do not take baths, swim, or use hot tubs for 10 days, or as instructed by your caregiver.  · Only take over-the-counter or prescription medicines for pain, discomfort, or fever as directed by your caregiver.  · You may continue your normal diet as directed.  · Do not lift more than 10 pounds (4.5 kg) or play contact sports for 3 weeks, or as directed.  · Slowly increase your activity after surgery.  · Take deep breaths to avoid getting a lung infection (pneumonia).  SEEK MEDICAL CARE IF:  · You have redness, swelling, or increasing pain in your wounds.  · You have pus coming from your wounds.  · You have drainage from a wound that lasts longer than 1 day.  · You notice a bad smell coming from the wounds or dressing.  · Your wound edges break open after stitches (sutures) have been removed.  · You notice increasing pain in the shoulders (shoulder strap areas) or near your shoulder blades.  · You develop dizzy episodes or fainting while standing.  · You develop shortness of breath.  · You develop persistent nausea or vomiting.  · You cannot control your bowel functions or lose your appetite.  · You develop diarrhea.  SEEK IMMEDIATE MEDICAL CARE IF:   · You have a fever.  · You develop a rash.  · You have difficulty breathing or sharp pains in your chest.  · You develop any reaction or side effects to medicines given.  MAKE SURE YOU:  · Understand these instructions.  · Will watch your condition.  · Will get help right away if you are not doing well or get worse.     This information is not intended to replace advice given to you by your health care provider. Make sure you discuss any questions you have with your health care provider.     Document  Released: 12/18/2006 Document Revised: 05/03/2016 Document Reviewed: 06/26/2012  Elsevier Interactive Patient Education ©2016 [employer]

## 2018-09-16 NOTE — PROGRESS NOTES
Patient refuse use of IS, states he is taking deep breaths on own. Education provided, will continue to monitor.

## 2018-09-16 NOTE — CARE PLAN
Problem: Communication  Goal: The ability to communicate needs accurately and effectively will improve  Outcome: PROGRESSING AS EXPECTED    Intervention: [place] patient and significant other/support system to call light to alert staff of needs  Patient oriented to surroundings and unit policies/routines.  Educated and understands the use of the call button.  Patient calls appropriately.      Problem: Safety  Goal: Will remain free from falls  Outcome: PROGRESSING AS EXPECTED    Intervention: Implement fall precautions  Patient educated and understands the fall precautions in place to prevent falls.  Bed alarm is off, patient calls appropriately, IV pole closest to bathroom, treaded slipper socks on, and bedrails closest to bathroom down.  Patient also educated and understands the use of the call button for any assistance with mobility.

## 2018-09-16 NOTE — PROGRESS NOTES
Bedside report completed.  Assumed pt care. Patient in bed, resting, in no apparent distress.  Safety precautions in place. Call light & personal belongings within reach.  Plan of care discussed.  Patient is on room air, IV saline locked.  No needs expressed at this time.  Will continue to monitor.

## 2018-09-16 NOTE — DISCHARGE SUMMARY
Discharge Summary    CHIEF COMPLAINT ON ADMISSION  Chief Complaint   Patient presents with   • Abdominal Pain       Reason for Admission  Abdominal Pain     Admission Date  9/14/2018    CODE STATUS  Full Code    HPI & HOSPITAL COURSE  This is a 32 y.o. male here with acute appendicitis.  He underwent a laparoscopic appendectomy which was difficult due to intense inflammation.  A drain was placed and he was admitted for observation.  His white count trended towards normal.  His pain was well-controlled with oral pain medication.  His drain was serosanguineous in decreasing.  His drain was removed and he was discharged home on oral antibiotics for 1 week.  He is to follow-up with me in 1 week or sooner for problems.       Therefore, he is discharged in good and stable condition to home with close outpatient follow-up.    The patient met 2-midnight criteria for an inpatient stay at the time of discharge.    Discharge Date  9/16/2018    FOLLOW UP ITEMS POST DISCHARGE  [place] in 1-2 weeks    DISCHARGE DIAGNOSES  Active Problems:    * No active hospital problems. *  Resolved Problems:    * No resolved hospital problems. *      FOLLOW UP  No future appointments.  No follow-up provider specified.    MEDICATIONS ON DISCHARGE     Medication List      ASK your doctor about these medications      Instructions   NEXIUM 20 MG capsule  Generic drug:  esomeprazole   Take 20 mg by mouth every day.  Dose:  20 mg            Allergies  No Known Allergies    DIET  Orders Placed This Encounter   Procedures   • Diet Order Regular     Standing Status:   Standing     Number of Occurrences:   1     Order Specific Question:   Diet:     Answer:   Regular [1]       ACTIVITY  As tolerated.  Weight bearing as tolerated    CONSULTATIONS  General surgery    PROCEDURES  Laparoscopic appendectomy with drain placement    LABORATORY  Lab Results   Component Value Date    SODIUM 140 09/14/2018    POTASSIUM 3.6 09/14/2018    CHLORIDE 103 09/14/2018     CO2 26 09/14/2018    GLUCOSE 145 (H) 09/14/2018    BUN 11 09/14/2018    CREATININE 1.05 09/14/2018        Lab Results   Component Value Date    WBC 11.7 (H) 09/16/2018    HEMOGLOBIN 13.9 (L) 09/16/2018    HEMATOCRIT 43.0 09/16/2018    PLATELETCT 222 09/16/2018        Total time of the discharge process exceeds 31 minutes.

## 2018-09-16 NOTE — PROGRESS NOTES
Received report from day shift RN. Patient laying in bed comfortably. States only has pain when he moves. Patient educated on importance of early ambulation, patient states understanding. Patient NANCY drain patent and draining serosanguinous fluid. Incision sites x2 to abdomen WNL. Bowel sounds hypoactive, patient states no passing gas at this time. Bed in low position, side rails x3, call light in reach. Patient encouraged to call when assistance when needed. Will continue to monitor.

## 2020-03-18 ENCOUNTER — OFFICE VISIT (OUTPATIENT)
Dept: URGENT CARE | Facility: PHYSICIAN GROUP | Age: 35
End: 2020-03-18
Payer: COMMERCIAL

## 2020-03-18 VITALS
HEIGHT: 68 IN | DIASTOLIC BLOOD PRESSURE: 76 MMHG | TEMPERATURE: 98.3 F | BODY MASS INDEX: 27.13 KG/M2 | HEART RATE: 104 BPM | OXYGEN SATURATION: 96 % | RESPIRATION RATE: 16 BRPM | WEIGHT: 179 LBS | SYSTOLIC BLOOD PRESSURE: 122 MMHG

## 2020-03-18 DIAGNOSIS — K20.90 ESOPHAGITIS: ICD-10-CM

## 2020-03-18 PROCEDURE — 99204 OFFICE O/P NEW MOD 45 MIN: CPT | Performed by: PHYSICIAN ASSISTANT

## 2020-03-18 RX ORDER — RABEPRAZOLE SODIUM 20 MG/1
20 TABLET, DELAYED RELEASE ORAL DAILY
Qty: 30 TAB | Refills: 11 | Status: SHIPPED | OUTPATIENT
Start: 2020-03-18 | End: 2020-07-14

## 2020-03-18 ASSESSMENT — FIBROSIS 4 INDEX: FIB4 SCORE: 0.72

## 2020-03-18 NOTE — PROGRESS NOTES
Chief Complaint   Patient presents with   • Reflux       HISTORY OF PRESENT ILLNESS: Patient is a 34 y.o. male who presents today because he has a one-week history of increasing esophageal pressure, worse with eating, worse in the evenings.  He does have a history of acid reflux and the symptoms are getting worse despite being consistent on his Nexium 20 which used to work for him.    There are no active problems to display for this patient.      Allergies:Patient has no known allergies.    Current Outpatient Medications Ordered in Epic   Medication Sig Dispense Refill   • rabeprazole (ACIPHEX) 20 MG tablet Take 1 Tab by mouth every day. 30 Tab 11   • esomeprazole (NEXIUM) 20 MG capsule Take 20 mg by mouth every day.     • amoxicillin-clavulanate (AUGMENTIN) 875-125 MG Tab Take 1 Tab by mouth 2 times a day. 14 Tab 0     No current Epic-ordered facility-administered medications on file.        Past Medical History:   Diagnosis Date   • GERD (gastroesophageal reflux disease)        Social History     Tobacco Use   • Smoking status: Current Every Day Smoker     Packs/day: 0.50     Types: Cigarettes   • Smokeless tobacco: Never Used   Substance Use Topics   • Alcohol use: Yes     Comment: occasionally   • Drug use: No       No family status information on file.   History reviewed. No pertinent family history.    ROS:  Review of Systems   Constitutional: Negative for fever, chills, weight loss and malaise/fatigue.   HENT: Negative for ear pain, nosebleeds, congestion, sore throat and neck pain.    Eyes: Negative for blurred vision.   Respiratory: Negative for cough, sputum production, shortness of breath and wheezing.    Cardiovascular: Negative for chest pain, palpitations, orthopnea and leg swelling.   Gastrointestinal: Positive for heartburn, no nausea, vomiting and abdominal pain.   Genitourinary: Negative for dysuria, urgency and frequency.     Exam:  /76 (BP Location: Right arm, Patient Position: Sitting, BP  "Cuff Size: Adult)   Pulse (!) 104   Temp 36.8 °C (98.3 °F) (Temporal)   Resp 16   Ht 1.727 m (5' 8\")   Wt 81.2 kg (179 lb)   SpO2 96%   General:  Well nourished, well developed male in NAD  Head:Normocephalic, atraumatic  Eyes: PERRLA, EOM within normal limits, no conjunctival injection, no scleral icterus, visual fields and acuity grossly intact.  Nose: Symmetrical without tenderness, no discharge.  Mouth: reasonable hygiene, no erythema exudates or tonsillar enlargement.  Neck: no masses, range of motion within normal limits, no tracheal deviation. No obvious thyroid enlargement.  Extremities: no clubbing, cyanosis, or edema.    Please note that this dictation was created using voice recognition software. I have made every reasonable attempt to correct obvious errors, but I expect that there are errors of grammar and possibly content that I did not discover before finalizing the note.    Assessment/Plan:  1. Esophagitis  rabeprazole (ACIPHEX) 20 MG tablet   Discussed avoidance of triggers including caffeine, alcohol, smoking, spicy foods    Followup with primary care in the next 7-10 days if not significantly improving, return to the urgent care or go to the emergency room sooner for any worsening of symptoms.       "

## 2020-03-18 NOTE — LETTER
March 18, 2020         Patient: Олег Ramirez   YOB: 1985   Date of Visit: 3/18/2020           To Whom it May Concern:    Олег Ramirez was seen in my clinic on 3/18/2020. He may return to work on on the evening of 3/19/2020, please excuse any recent absences.    If you have any questions or concerns, please don't hesitate to call.        Sincerely,           Valente Monroy P.A.-C.  Electronically Signed

## 2020-05-15 ENCOUNTER — APPOINTMENT (OUTPATIENT)
Dept: URGENT CARE | Facility: PHYSICIAN GROUP | Age: 35
End: 2020-05-15
Payer: COMMERCIAL

## 2020-05-20 ENCOUNTER — OFFICE VISIT (OUTPATIENT)
Dept: MEDICAL GROUP | Facility: PHYSICIAN GROUP | Age: 35
End: 2020-05-20
Payer: COMMERCIAL

## 2020-05-20 VITALS
RESPIRATION RATE: 16 BRPM | HEART RATE: 105 BPM | SYSTOLIC BLOOD PRESSURE: 122 MMHG | HEIGHT: 68 IN | BODY MASS INDEX: 27.89 KG/M2 | OXYGEN SATURATION: 95 % | DIASTOLIC BLOOD PRESSURE: 84 MMHG | TEMPERATURE: 98.7 F | WEIGHT: 184 LBS

## 2020-05-20 DIAGNOSIS — K21.00 GASTROESOPHAGEAL REFLUX DISEASE WITH ESOPHAGITIS: ICD-10-CM

## 2020-05-20 PROCEDURE — 99203 OFFICE O/P NEW LOW 30 MIN: CPT | Performed by: PHYSICIAN ASSISTANT

## 2020-05-20 ASSESSMENT — FIBROSIS 4 INDEX: FIB4 SCORE: 0.72

## 2020-05-20 ASSESSMENT — PATIENT HEALTH QUESTIONNAIRE - PHQ9: CLINICAL INTERPRETATION OF PHQ2 SCORE: 0

## 2020-05-20 NOTE — PROGRESS NOTES
"cc:  Acid reflux    Subjective:     Олег Ramirez is a 34 y.o. male presenting for acid reflux      Patient presents to the office for acid reflux.  Patient states that he missed work form May 4-12.  He states that he was missing work for coughing up blood, acid reflux, vomiting and coughing.  He states that he has missed 4 days of work and is needing paperwork filled out.  He states that he was taking nexium for this but has switched to aciphex.   He states that he went to an urgent care in [place] and was given aciphex. He states that when he eats, he feels his esophagus is being compressed and states that he is drinking fluids.  He states that he needs paperwork filled out but did not print it as he does not have a printer.  He states that his symptoms are improved if he takes the medication before he goes to sleep. He states that his last vomiting episode was about May 5.  He states that he was also having diarrhea.    Review of systems:  See above.   Denies any symptoms unless previously indicated.        Current Outpatient Medications:   •  rabeprazole (ACIPHEX) 20 MG tablet, Take 1 Tab by mouth every day., Disp: 30 Tab, Rfl: 11  •  esomeprazole (NEXIUM) 20 MG capsule, Take 20 mg by mouth every day., Disp: , Rfl:     Allergies, past medical history, past surgical history, family history, social history reviewed and updated    Objective:     Vitals: /84   Pulse (!) 105   Temp 37.1 °C (98.7 °F) (Temporal)   Resp 16   Ht 1.727 m (5' 8\")   Wt 83.5 kg (184 lb)   SpO2 95%   BMI 27.98 kg/m²   General: Alert, pleasant, NAD  EYES:   PERRL, EOMI, no icterus or pallor.  Conjunctivae and lids normal.   HENT:  Normocephalic.  External ears normal. Facial mask in place. Neck supple.     Heart: tachycardic rate and rhythm.  S1 and S2 normal.  No murmurs appreciated.  Respiratory: Normal respiratory effort.  Clear to auscultation bilaterally.  Abdomen: Non-distended, soft, non-tender, no guarding/rebound. Bowel " sounds present. Negative ragland sign.  Skin: Warm, dry, no rashes.  Musculoskeletal: Gait is normal.  Moves all extremities well.    Extremities: normal range of motion all extremities.   Neurological: No tremors, sensation grossly intact,  CN2-12 intact.  Psych:  Affect/mood is normal, judgement is good, memory is intact, grooming is appropriate.    Assessment/Plan:     Олег was seen today for gastrophageal reflux, establish care, diarrhea and vomiting.    Diagnoses and all orders for this visit:    Gastroesophageal reflux disease with esophagitis  -     CBC WITH DIFFERENTIAL; Future  -     Comp Metabolic Panel; Future  -     H.PYLORI,IGA/IGM ABS  -     LIPASE; Future  -     AMYLASE; Future  -     TSH+FREE T4  -     REFERRAL TO GASTROENTEROLOGY      Continue medications.  Labs as indicated and urgent referral to GI.  Follow up in 2 weeks with lab results.  Note to return to work.  He will drop paperwork off and will fill out once received.  If symptoms worsen, patient is to go to ER.  Otherwise call with any further questions or concerns.      Return in about 2 weeks (around 6/3/2020), or if symptoms worsen or fail to improve.    Please note that this dictation was created using voice recognition software. I have made every reasonable attempt to correct obvious errors, but expect that there are errors of grammar and possible content that I did not discover before finalizing note.

## 2020-05-20 NOTE — LETTER
May 20, 2020       Patient: Олег Ramirez   YOB: 1985   Date of Visit: 5/20/2020         To Whom It May Concern:    In my medical opinion, I recommend that Олег Ramirez return to work on 5-.    If you have any questions or concerns, please don't hesitate to call [phone]          Sincerely,          Nasima Collins P.A.-C.  Electronically Signed

## 2020-05-26 ENCOUNTER — TELEPHONE (OUTPATIENT)
Dept: MEDICAL GROUP | Facility: CLINIC | Age: 35
End: 2020-05-26

## 2020-05-29 ENCOUNTER — HOSPITAL ENCOUNTER (OUTPATIENT)
Dept: LAB | Facility: MEDICAL CENTER | Age: 35
End: 2020-05-29
Attending: PHYSICIAN ASSISTANT
Payer: COMMERCIAL

## 2020-05-29 DIAGNOSIS — K21.00 GASTROESOPHAGEAL REFLUX DISEASE WITH ESOPHAGITIS: ICD-10-CM

## 2020-05-29 LAB
BASOPHILS # BLD AUTO: 0.6 % (ref 0–1.8)
BASOPHILS # BLD: 0.06 K/UL (ref 0–0.12)
EOSINOPHIL # BLD AUTO: 0.92 K/UL (ref 0–0.51)
EOSINOPHIL NFR BLD: 9.3 % (ref 0–6.9)
ERYTHROCYTE [DISTWIDTH] IN BLOOD BY AUTOMATED COUNT: 42.2 FL (ref 35.9–50)
HCT VFR BLD AUTO: 48.4 % (ref 42–52)
HGB BLD-MCNC: 15.6 G/DL (ref 14–18)
IMM GRANULOCYTES # BLD AUTO: 0.01 K/UL (ref 0–0.11)
IMM GRANULOCYTES NFR BLD AUTO: 0.1 % (ref 0–0.9)
LYMPHOCYTES # BLD AUTO: 3.33 K/UL (ref 1–4.8)
LYMPHOCYTES NFR BLD: 33.8 % (ref 22–41)
MCH RBC QN AUTO: 27.4 PG (ref 27–33)
MCHC RBC AUTO-ENTMCNC: 32.2 G/DL (ref 33.7–35.3)
MCV RBC AUTO: 85.1 FL (ref 81.4–97.8)
MONOCYTES # BLD AUTO: 1.08 K/UL (ref 0–0.85)
MONOCYTES NFR BLD AUTO: 11 % (ref 0–13.4)
NEUTROPHILS # BLD AUTO: 4.45 K/UL (ref 1.82–7.42)
NEUTROPHILS NFR BLD: 45.2 % (ref 44–72)
NRBC # BLD AUTO: 0 K/UL
NRBC BLD-RTO: 0 /100 WBC
PLATELET # BLD AUTO: 326 K/UL (ref 164–446)
PMV BLD AUTO: 11 FL (ref 9–12.9)
RBC # BLD AUTO: 5.69 M/UL (ref 4.7–6.1)
WBC # BLD AUTO: 9.9 K/UL (ref 4.8–10.8)

## 2020-05-29 PROCEDURE — 80053 COMPREHEN METABOLIC PANEL: CPT

## 2020-05-29 PROCEDURE — 82150 ASSAY OF AMYLASE: CPT

## 2020-05-29 PROCEDURE — 84439 ASSAY OF FREE THYROXINE: CPT

## 2020-05-29 PROCEDURE — 85025 COMPLETE CBC W/AUTO DIFF WBC: CPT

## 2020-05-29 PROCEDURE — 84443 ASSAY THYROID STIM HORMONE: CPT

## 2020-05-29 PROCEDURE — 36415 COLL VENOUS BLD VENIPUNCTURE: CPT

## 2020-05-29 PROCEDURE — 83690 ASSAY OF LIPASE: CPT

## 2020-05-30 LAB
ALBUMIN SERPL BCP-MCNC: 4.5 G/DL (ref 3.2–4.9)
ALBUMIN/GLOB SERPL: 1.7 G/DL
ALP SERPL-CCNC: 68 U/L (ref 30–99)
ALT SERPL-CCNC: 24 U/L (ref 2–50)
AMYLASE SERPL-CCNC: 18 U/L (ref 20–103)
ANION GAP SERPL CALC-SCNC: 12 MMOL/L (ref 7–16)
AST SERPL-CCNC: 20 U/L (ref 12–45)
BILIRUB SERPL-MCNC: 1.4 MG/DL (ref 0.1–1.5)
BUN SERPL-MCNC: 14 MG/DL (ref 8–22)
CALCIUM SERPL-MCNC: 10.2 MG/DL (ref 8.5–10.5)
CHLORIDE SERPL-SCNC: 103 MMOL/L (ref 96–112)
CO2 SERPL-SCNC: 27 MMOL/L (ref 20–33)
CREAT SERPL-MCNC: 0.98 MG/DL (ref 0.5–1.4)
FASTING STATUS PATIENT QL REPORTED: NORMAL
GLOBULIN SER CALC-MCNC: 2.7 G/DL (ref 1.9–3.5)
GLUCOSE SERPL-MCNC: 105 MG/DL (ref 65–99)
LIPASE SERPL-CCNC: 21 U/L (ref 11–82)
POTASSIUM SERPL-SCNC: 3.9 MMOL/L (ref 3.6–5.5)
PROT SERPL-MCNC: 7.2 G/DL (ref 6–8.2)
SODIUM SERPL-SCNC: 142 MMOL/L (ref 135–145)
T4 FREE SERPL-MCNC: 1.11 NG/DL (ref 0.93–1.7)
TSH SERPL DL<=0.005 MIU/L-ACNC: 0.69 UIU/ML (ref 0.38–5.33)

## 2020-06-03 ENCOUNTER — OFFICE VISIT (OUTPATIENT)
Dept: MEDICAL GROUP | Facility: PHYSICIAN GROUP | Age: 35
End: 2020-06-03
Payer: COMMERCIAL

## 2020-06-03 VITALS
HEART RATE: 115 BPM | WEIGHT: 186 LBS | BODY MASS INDEX: 28.19 KG/M2 | RESPIRATION RATE: 16 BRPM | TEMPERATURE: 99.1 F | HEIGHT: 68 IN | DIASTOLIC BLOOD PRESSURE: 98 MMHG | OXYGEN SATURATION: 95 % | SYSTOLIC BLOOD PRESSURE: 138 MMHG

## 2020-06-03 DIAGNOSIS — R00.2 PALPITATIONS: ICD-10-CM

## 2020-06-03 DIAGNOSIS — I45.81 PROLONGED QT SYNDROME: ICD-10-CM

## 2020-06-03 DIAGNOSIS — R00.0 TACHYCARDIA: ICD-10-CM

## 2020-06-03 DIAGNOSIS — K21.9 GASTROESOPHAGEAL REFLUX DISEASE WITHOUT ESOPHAGITIS: ICD-10-CM

## 2020-06-03 PROCEDURE — 99214 OFFICE O/P EST MOD 30 MIN: CPT | Performed by: PHYSICIAN ASSISTANT

## 2020-06-03 RX ORDER — ESOMEPRAZOLE MAGNESIUM 40 MG/1
40 CAPSULE, DELAYED RELEASE ORAL
Qty: 30 CAP | Refills: 3 | Status: SHIPPED | OUTPATIENT
Start: 2020-06-03 | End: 2020-10-26 | Stop reason: SDUPTHER

## 2020-06-03 ASSESSMENT — FIBROSIS 4 INDEX: FIB4 SCORE: 0.4

## 2020-06-03 NOTE — PROGRESS NOTES
"cc:  Follow up    Subjective:     Олег Ramirez is a 34 y.o. male presenting for follow up      Patient presents to the office for follow up.  Patient states that the aciphex does not work as well as the nexium and has better results with the nexium.  He states that he has heartburn with the aciphex.       Patient states that he has had a flutter in his chest for a year.  He states that his dad and his sister have a history of a high heart rate.  He denies a family history of heart issues.  He does acknowledge some stress.  He denies chest pain.  He does have shortness of breath.  He denies tingling in the lips.  He denies pain denies.  He does acknowledge significant caffeine use.  He states that he feels it is like a muscle twitch    Review of systems:  See above.   Denies any symptoms unless previously indicated.        Current Outpatient Medications:   •  esomeprazole (NEXIUM) 40 MG delayed-release capsule, Take 1 Cap by mouth every morning before breakfast., Disp: 30 Cap, Rfl: 3  •  rabeprazole (ACIPHEX) 20 MG tablet, Take 1 Tab by mouth every day., Disp: 30 Tab, Rfl: 11    Allergies, past medical history, past surgical history, family history, social history reviewed and updated    Objective:     Vitals: /98   Pulse (!) 115   Temp 37.3 °C (99.1 °F) (Temporal)   Resp 16   Ht 1.727 m (5' 8\")   Wt 84.4 kg (186 lb)   SpO2 95%   BMI 28.28 kg/m²   General: Alert, pleasant, NAD  EYES:   PERRL, EOMI, no icterus or pallor.  Conjunctivae and lids normal.   HENT:  Normocephalic.  External ears normal.   No nasal drainage present.   Neck supple.     Heart: tachycardic rate and rhythm.  S1 and S2 normal.  No murmurs appreciated.  Respiratory: Normal respiratory effort.  Clear to auscultation bilaterally.  Abdomen: overweight  Skin: Warm, dry, no rashes.  Musculoskeletal: Gait is normal.  Moves all extremities well.    Neurological: No tremors, sensation grossly intact,  CN2-12 intact.  Psych:  Affect/mood is " normal, judgement is good, memory is intact, grooming is appropriate.    Assessment/Plan:     Олег was seen today for lab results.    Diagnoses and all orders for this visit:    Gastroesophageal reflux disease without esophagitis  -     esomeprazole (NEXIUM) 40 MG delayed-release capsule; Take 1 Cap by mouth every morning before breakfast.  -     H. PYLORI BREATH TEST  -     Comp Metabolic Panel; Future  -     HEMOGLOBIN A1C; Future    Symptoms are not controlled.  Unable to obtain H. pylori with last labs.  We will order further testing.  Patient also had an elevated glucose level but states he was not fasting.  We will check this as well.  We will follow-up in approximately 4 weeks, sooner if needed.    Palpitations  -     DX-CHEST-2 VIEWS; Future  -     EKG - Clinic Performed  -     REFERRAL TO CARDIOLOGY  Tachycardia  -     REFERRAL TO CARDIOLOGY  Prolonged QT syndrome  -     REFERRAL TO CARDIOLOGY    Will refer to cardiology for further evaluation.        Return in about 4 weeks (around 7/1/2020), or if symptoms worsen or fail to improve, for 2-6 weeks.    Please note that this dictation was created using voice recognition software. I have made every reasonable attempt to correct obvious errors, but expect that there are errors of grammar and possible content that I did not discover before finalizing note.

## 2020-06-09 ENCOUNTER — PATIENT MESSAGE (OUTPATIENT)
Dept: HEALTH INFORMATION MANAGEMENT | Facility: OTHER | Age: 35
End: 2020-06-09

## 2020-07-13 ENCOUNTER — APPOINTMENT (OUTPATIENT)
Dept: URGENT CARE | Facility: PHYSICIAN GROUP | Age: 35
End: 2020-07-13
Payer: COMMERCIAL

## 2020-07-13 ENCOUNTER — APPOINTMENT (OUTPATIENT)
Dept: RADIOLOGY | Facility: IMAGING CENTER | Age: 35
End: 2020-07-13
Attending: PHYSICIAN ASSISTANT
Payer: COMMERCIAL

## 2020-07-13 DIAGNOSIS — R00.2 PALPITATIONS: ICD-10-CM

## 2020-07-13 PROCEDURE — 71046 X-RAY EXAM CHEST 2 VIEWS: CPT | Mod: TC,FY | Performed by: PHYSICIAN ASSISTANT

## 2020-07-14 ENCOUNTER — APPOINTMENT (OUTPATIENT)
Dept: URGENT CARE | Facility: PHYSICIAN GROUP | Age: 35
End: 2020-07-14

## 2020-07-14 ENCOUNTER — HOSPITAL ENCOUNTER (EMERGENCY)
Facility: MEDICAL CENTER | Age: 35
End: 2020-07-14
Attending: EMERGENCY MEDICINE
Payer: COMMERCIAL

## 2020-07-14 ENCOUNTER — APPOINTMENT (OUTPATIENT)
Dept: RADIOLOGY | Facility: MEDICAL CENTER | Age: 35
End: 2020-07-14
Attending: EMERGENCY MEDICINE
Payer: COMMERCIAL

## 2020-07-14 ENCOUNTER — OFFICE VISIT (OUTPATIENT)
Dept: URGENT CARE | Facility: PHYSICIAN GROUP | Age: 35
End: 2020-07-14
Payer: COMMERCIAL

## 2020-07-14 ENCOUNTER — TELEPHONE (OUTPATIENT)
Dept: MEDICAL GROUP | Facility: CLINIC | Age: 35
End: 2020-07-14

## 2020-07-14 VITALS
DIASTOLIC BLOOD PRESSURE: 82 MMHG | SYSTOLIC BLOOD PRESSURE: 110 MMHG | TEMPERATURE: 98.3 F | HEART RATE: 144 BPM | HEIGHT: 68 IN | OXYGEN SATURATION: 94 % | WEIGHT: 174 LBS | RESPIRATION RATE: 14 BRPM | BODY MASS INDEX: 26.37 KG/M2

## 2020-07-14 VITALS
BODY MASS INDEX: 27.26 KG/M2 | SYSTOLIC BLOOD PRESSURE: 113 MMHG | HEIGHT: 68 IN | HEART RATE: 94 BPM | OXYGEN SATURATION: 98 % | RESPIRATION RATE: 18 BRPM | TEMPERATURE: 97.6 F | DIASTOLIC BLOOD PRESSURE: 67 MMHG | WEIGHT: 179.9 LBS

## 2020-07-14 DIAGNOSIS — R00.0 TACHYCARDIA: ICD-10-CM

## 2020-07-14 DIAGNOSIS — R55 SYNCOPE, UNSPECIFIED SYNCOPE TYPE: ICD-10-CM

## 2020-07-14 DIAGNOSIS — E86.0 DEHYDRATION: ICD-10-CM

## 2020-07-14 DIAGNOSIS — R07.9 CHEST PAIN, UNSPECIFIED TYPE: ICD-10-CM

## 2020-07-14 LAB
ALBUMIN SERPL BCP-MCNC: 4.8 G/DL (ref 3.2–4.9)
ALBUMIN/GLOB SERPL: 1.5 G/DL
ALP SERPL-CCNC: 69 U/L (ref 30–99)
ALT SERPL-CCNC: 35 U/L (ref 2–50)
AMPHET UR QL SCN: NEGATIVE
ANION GAP SERPL CALC-SCNC: 16 MMOL/L (ref 7–16)
AST SERPL-CCNC: 26 U/L (ref 12–45)
BARBITURATES UR QL SCN: NEGATIVE
BASOPHILS # BLD AUTO: 0.8 % (ref 0–1.8)
BASOPHILS # BLD: 0.09 K/UL (ref 0–0.12)
BENZODIAZ UR QL SCN: NEGATIVE
BILIRUB SERPL-MCNC: 1 MG/DL (ref 0.1–1.5)
BUN SERPL-MCNC: 17 MG/DL (ref 8–22)
BZE UR QL SCN: NEGATIVE
CALCIUM SERPL-MCNC: 10 MG/DL (ref 8.5–10.5)
CANNABINOIDS UR QL SCN: NEGATIVE
CHLORIDE SERPL-SCNC: 102 MMOL/L (ref 96–112)
CO2 SERPL-SCNC: 24 MMOL/L (ref 20–33)
COVID ORDER STATUS COVID19: NORMAL
CREAT SERPL-MCNC: 0.85 MG/DL (ref 0.5–1.4)
EKG IMPRESSION: NORMAL
EKG IMPRESSION: NORMAL
EOSINOPHIL # BLD AUTO: 0.43 K/UL (ref 0–0.51)
EOSINOPHIL NFR BLD: 3.8 % (ref 0–6.9)
ERYTHROCYTE [DISTWIDTH] IN BLOOD BY AUTOMATED COUNT: 39.8 FL (ref 35.9–50)
GLOBULIN SER CALC-MCNC: 3.2 G/DL (ref 1.9–3.5)
GLUCOSE SERPL-MCNC: 85 MG/DL (ref 65–99)
HCT VFR BLD AUTO: 51.1 % (ref 42–52)
HGB BLD-MCNC: 17.1 G/DL (ref 14–18)
IMM GRANULOCYTES # BLD AUTO: 0.04 K/UL (ref 0–0.11)
IMM GRANULOCYTES NFR BLD AUTO: 0.4 % (ref 0–0.9)
LYMPHOCYTES # BLD AUTO: 3.16 K/UL (ref 1–4.8)
LYMPHOCYTES NFR BLD: 27.7 % (ref 22–41)
MCH RBC QN AUTO: 27.7 PG (ref 27–33)
MCHC RBC AUTO-ENTMCNC: 33.5 G/DL (ref 33.7–35.3)
MCV RBC AUTO: 82.7 FL (ref 81.4–97.8)
METHADONE UR QL SCN: NEGATIVE
MONOCYTES # BLD AUTO: 1.16 K/UL (ref 0–0.85)
MONOCYTES NFR BLD AUTO: 10.2 % (ref 0–13.4)
NEUTROPHILS # BLD AUTO: 6.52 K/UL (ref 1.82–7.42)
NEUTROPHILS NFR BLD: 57.1 % (ref 44–72)
NRBC # BLD AUTO: 0 K/UL
NRBC BLD-RTO: 0 /100 WBC
OPIATES UR QL SCN: NEGATIVE
OXYCODONE UR QL SCN: NEGATIVE
PCP UR QL SCN: NEGATIVE
PLATELET # BLD AUTO: 316 K/UL (ref 164–446)
PMV BLD AUTO: 10.1 FL (ref 9–12.9)
POTASSIUM SERPL-SCNC: 3.8 MMOL/L (ref 3.6–5.5)
PROPOXYPH UR QL SCN: NEGATIVE
PROT SERPL-MCNC: 8 G/DL (ref 6–8.2)
RBC # BLD AUTO: 6.18 M/UL (ref 4.7–6.1)
SODIUM SERPL-SCNC: 142 MMOL/L (ref 135–145)
TROPONIN T SERPL-MCNC: 7 NG/L (ref 6–19)
WBC # BLD AUTO: 11.4 K/UL (ref 4.8–10.8)

## 2020-07-14 PROCEDURE — U0003 INFECTIOUS AGENT DETECTION BY NUCLEIC ACID (DNA OR RNA); SEVERE ACUTE RESPIRATORY SYNDROME CORONAVIRUS 2 (SARS-COV-2) (CORONAVIRUS DISEASE [COVID-19]), AMPLIFIED PROBE TECHNIQUE, MAKING USE OF HIGH THROUGHPUT TECHNOLOGIES AS DESCRIBED BY CMS-2020-01-R: HCPCS | Mod: EDC

## 2020-07-14 PROCEDURE — 99214 OFFICE O/P EST MOD 30 MIN: CPT | Performed by: FAMILY MEDICINE

## 2020-07-14 PROCEDURE — 71275 CT ANGIOGRAPHY CHEST: CPT

## 2020-07-14 PROCEDURE — C9803 HOPD COVID-19 SPEC COLLECT: HCPCS | Mod: EDC | Performed by: EMERGENCY MEDICINE

## 2020-07-14 PROCEDURE — 36415 COLL VENOUS BLD VENIPUNCTURE: CPT

## 2020-07-14 PROCEDURE — 700117 HCHG RX CONTRAST REV CODE 255: Mod: EDC | Performed by: EMERGENCY MEDICINE

## 2020-07-14 PROCEDURE — 80307 DRUG TEST PRSMV CHEM ANLYZR: CPT | Mod: EDC

## 2020-07-14 PROCEDURE — 93005 ELECTROCARDIOGRAM TRACING: CPT | Mod: EDC | Performed by: EMERGENCY MEDICINE

## 2020-07-14 PROCEDURE — 700105 HCHG RX REV CODE 258: Mod: EDC | Performed by: EMERGENCY MEDICINE

## 2020-07-14 PROCEDURE — 93005 ELECTROCARDIOGRAM TRACING: CPT

## 2020-07-14 PROCEDURE — 85025 COMPLETE CBC W/AUTO DIFF WBC: CPT | Mod: EDC

## 2020-07-14 PROCEDURE — 99284 EMERGENCY DEPT VISIT MOD MDM: CPT | Mod: EDC

## 2020-07-14 PROCEDURE — 84484 ASSAY OF TROPONIN QUANT: CPT | Mod: EDC

## 2020-07-14 PROCEDURE — 80053 COMPREHEN METABOLIC PANEL: CPT | Mod: EDC

## 2020-07-14 RX ORDER — SODIUM CHLORIDE 9 MG/ML
1000 INJECTION, SOLUTION INTRAVENOUS ONCE
Status: COMPLETED | OUTPATIENT
Start: 2020-07-14 | End: 2020-07-14

## 2020-07-14 RX ADMIN — IOHEXOL 27 ML: 350 INJECTION, SOLUTION INTRAVENOUS at 19:11

## 2020-07-14 RX ADMIN — SODIUM CHLORIDE 1000 ML: 9 INJECTION, SOLUTION INTRAVENOUS at 16:53

## 2020-07-14 ASSESSMENT — ENCOUNTER SYMPTOMS
NAUSEA: 0
SORE THROAT: 0
FEVER: 0
CHILLS: 0
SHORTNESS OF BREATH: 1
PALPITATIONS: 1
DIZZINESS: 0
COUGH: 0
VOMITING: 0
MYALGIAS: 0

## 2020-07-14 ASSESSMENT — FIBROSIS 4 INDEX
FIB4 SCORE: 0.4
FIB4 SCORE: 0.4

## 2020-07-14 NOTE — ED PROVIDER NOTES
ED Provider Note    Scribed for Jimmy Montgomery M.D. by Lea Haddad. 7/14/2020, 4:33 PM.    Primary care provider: Nasima Collins P.A.-C.  Means of arrival: Walk in  History obtained from: patient  History limited by: none    CHIEF COMPLAINT  Chief Complaint   Patient presents with   • Syncope     x's 2 days ago at work. Saw pcp and was sent to [facility] from  and Minimal linear lingular atelectasis was seen on xray   • Palpitations     x's 1 month.        HPI  Олег Ramirez is a 34 y.o. male who presents to the Emergency Department for tachycardia onset 3 days ago. Patient states his heart rate has been spiking to 157 bpm. He describes that he fainted at work and was unconscious for only a few seconds. He does not remember how he felt prior to his syncopal episode but denies any seizure like activity.  He was seen by his PCP one month ago for palpitations however he did not complete the x-ray until yesterday after his syncopal episode. He presented to  today for his x-ray results and they sent him here.  He has associated headaches and sharp chest pain but denies any chest pain at this time. Denies fever, chills, cough, sore throat, swelling, or recent sick contacts. Denies past medical history of heart attack, stent, DVT, or PE, but states his cousin has a stent at 40 years old.    REVIEW OF SYSTEMS  Pertinent positives include tachycardia, palpitations, and syncopal episode . Pertinent negatives include  fever, chills, cough, swelling, or sore throat. All other systems negative.    PAST MEDICAL HISTORY   has a past medical history of GERD (gastroesophageal reflux disease).    SURGICAL HISTORY   has a past surgical history that includes appendectomy laparoscopic (9/14/2018) and appendectomy.    SOCIAL HISTORY  Social History     Tobacco Use   • Smoking status: Former Smoker     Packs/day: 0.50   • Smokeless tobacco: Never Used   Substance Use Topics   • Alcohol use: Yes     Comment: occasionally   •  "Drug use: No      Social History     Substance and Sexual Activity   Drug Use No       FAMILY HISTORY  Family History   Problem Relation Age of Onset   • Cancer Paternal Grandfather         esophageal       CURRENT MEDICATIONS  Home Medications     Reviewed by Erica Okeefe R.N. (Registered Nurse) on 07/14/20 at 1443  Med List Status: Not Addressed   Medication Last Dose Status   esomeprazole (NEXIUM) 40 MG delayed-release capsule  Active                ALLERGIES  No Known Allergies    PHYSICAL EXAM  VITAL SIGNS: /89   Pulse (!) 104   Temp 36.5 °C (97.7 °F) (Temporal)   Resp 16   Ht 1.727 m (5' 8\")   Wt 81.6 kg (179 lb 14.3 oz)   SpO2 94%   BMI 27.35 kg/m²     Constitutional: Well developed, Well nourished, mild distress.   HENT: Normocephalic, Atraumatic, Oropharynx moist.   Eyes: Conjunctiva normal, No discharge.   Neck: Supple, No stridor.  Cardiovascular: Tachycardic, Normal rhythm, No murmurs, equal pulses.   Pulmonary: Normal breath sounds, No respiratory distress, No wheezing, No rales, No rhonchi.  Chest: No chest wall tenderness or deformity.   Abdomen:Soft, No tenderness, No masses, no rebound, no guarding.   Back: No CVA tenderness.   Musculoskeletal: No major deformities noted, No tenderness. No calf tenderness, no cords, calf's appear symmetric.  Skin: Warm, Dry, No erythema, No rash.   Neurologic: Alert & oriented x 3, Normal motor function,  No focal deficits noted.   Psychiatric: Affect normal, Judgment normal, Mood normal.     LABS  Results for orders placed or performed during the hospital encounter of 07/14/20   CBC with Differential   Result Value Ref Range    WBC 11.4 (H) 4.8 - 10.8 K/uL    RBC 6.18 (H) 4.70 - 6.10 M/uL    Hemoglobin 17.1 14.0 - 18.0 g/dL    Hematocrit 51.1 42.0 - 52.0 %    MCV 82.7 81.4 - 97.8 fL    MCH 27.7 27.0 - 33.0 pg    MCHC 33.5 (L) 33.7 - 35.3 g/dL    RDW 39.8 35.9 - 50.0 fL    Platelet Count 316 164 - 446 K/uL    MPV 10.1 9.0 - 12.9 fL    " Neutrophils-Polys 57.10 44.00 - 72.00 %    Lymphocytes 27.70 22.00 - 41.00 %    Monocytes 10.20 0.00 - 13.40 %    Eosinophils 3.80 0.00 - 6.90 %    Basophils 0.80 0.00 - 1.80 %    Immature Granulocytes 0.40 0.00 - 0.90 %    Nucleated RBC 0.00 /100 WBC    Neutrophils (Absolute) 6.52 1.82 - 7.42 K/uL    Lymphs (Absolute) 3.16 1.00 - 4.80 K/uL    Monos (Absolute) 1.16 (H) 0.00 - 0.85 K/uL    Eos (Absolute) 0.43 0.00 - 0.51 K/uL    Baso (Absolute) 0.09 0.00 - 0.12 K/uL    Immature Granulocytes (abs) 0.04 0.00 - 0.11 K/uL    NRBC (Absolute) 0.00 K/uL   Complete Metabolic Panel (CMP)   Result Value Ref Range    Sodium 142 135 - 145 mmol/L    Potassium 3.8 3.6 - 5.5 mmol/L    Chloride 102 96 - 112 mmol/L    Co2 24 20 - 33 mmol/L    Anion Gap 16.0 7.0 - 16.0    Glucose 85 65 - 99 mg/dL    Bun 17 8 - 22 mg/dL    Creatinine 0.85 0.50 - 1.40 mg/dL    Calcium 10.0 8.5 - 10.5 mg/dL    AST(SGOT) 26 12 - 45 U/L    ALT(SGPT) 35 2 - 50 U/L    Alkaline Phosphatase 69 30 - 99 U/L    Total Bilirubin 1.0 0.1 - 1.5 mg/dL    Albumin 4.8 3.2 - 4.9 g/dL    Total Protein 8.0 6.0 - 8.2 g/dL    Globulin 3.2 1.9 - 3.5 g/dL    A-G Ratio 1.5 g/dL   Troponin   Result Value Ref Range    Troponin T 7 6 - 19 ng/L   ESTIMATED GFR   Result Value Ref Range    GFR If African American >60 >60 mL/min/1.73 m 2    GFR If Non African American >60 >60 mL/min/1.73 m 2   Urine Drug Screen   Result Value Ref Range    Amphetamines Urine Negative Negative    Barbiturates Negative Negative    Benzodiazepines Negative Negative    Cocaine Metabolite Negative Negative    Methadone Negative Negative    Opiates Negative Negative    Oxycodone Negative Negative    Phencyclidine -Pcp Negative Negative    Propoxyphene Negative Negative    Cannabinoid Metab Negative Negative   COVID/SARS CoV-2 PCR    Specimen: Nasopharyngeal; Respirate   Result Value Ref Range    COVID Order Status Received    SARS-CoV-2, PCR (In-House)   Result Value Ref Range    SARS-CoV-2 Source NP Swab     EKG (NOW)   Result Value Ref Range    Report       [facility] Emergency Dept.    Test Date:  2020  Pt Name:    TRE VIRK               Department: ER  MRN:        [id number]                      Room:  Gender:     Male                         Technician: [id number]  :        1985                   Requested By:ER TRIAGE PROTOCOL  Order #:    [id number]                    Reading MD: ROLAN CARBAJAL MD    Measurements  Intervals                                Axis  Rate:       123                          P:          53  ME:         136                          QRS:        -4  QRSD:       96                           T:          51  QT:         328  QTc:        470    Interpretive Statements  SINUS TACHYCARDIA, rate of 123, normal axis  BORDERLINE PROLONGED QT INTERVAL  No previous ECG available for comparison  Electronically Signed On 2020 16:35:12 PDT by ROLAN CARBAJAL MD     EKG   Result Value Ref Range    Report       [facility] Emergency Dept.    Test Date:  2020  Pt Name:    TRE VIRK               Department: ER  MRN:        [id number]                      Room:  Gender:     Male                         Technician: DO  :        1985                   Requested By:ER TRIAGE PROTOCOL  Order #:    [id number]                    Reading MD: ROLAN CARBAJAL MD    Measurements  Intervals                                Axis  Rate:       99                           P:          48  ME:         140                          QRS:        45  QRSD:       98                           T:          5  QT:         368  QTc:        473    Interpretive Statements  SINUS RHYTHM  BASELINE WANDER IN LEAD(S) V1  Compared to ECG 2020 14:23:24  Sinus tachycardia no longer present  Electronically Signed On 2020 21:32:20 PDT by ROLAN CARBAJAL MD       All labs reviewed by me.    EKG  12 Lead EKG interpreted by me as above.      RADIOLOGY  CT-CTA CHEST PULMONARY ARTERY W/ RECONS   Final Result      1.  No evidence of pulmonary embolism.      2.  Minimal alveolar groundglass opacity in the right middle lobe medially which could represent minimal alveolar pneumonia or pneumonitis.      3.  2.0 cm in diameter subcutaneous area of fat in the distal esophagus which may represent some serosal lipoma.              The radiologist's interpretation of all radiological studies have been reviewed by me.    COURSE & MEDICAL DECISION MAKING  Pertinent Labs & Imaging studies reviewed. (See chart for details)    4:33 PM - Patient seen and examined at bedside. Informed the patient that his heart enzymes are normal and his EKG does not show any abnormalities or signs of a heart attack. We will treat him with fluids to hopefully improve his tachycardia. As well we will obtain a CT to look for a PE. The patient will receive IV fluids for tachycardia. Ordered CTA chest, urine drug screen, CMP, CBC w/ diff, troponin, and EKG to evaluate his symptoms. The differential diagnoses include but are not limited to: PE, arrhythmia, electrolyte abnormality, dehydration, myocardial infarction, drug use    8:42 PM - Patient was reevaluated at bedside. Heart rate is improved after fluids. Discussed lab and radiology results with the patient and informed them that there is not blood clot. Patient states he does not want a COVID test because it is too painful, however after discussion of the process he agrees to receive the test. Paged Cardiology.    8:55 PM I discussed the patient's case and the above findings with Dr. Hagan (cardiology) who will follow up with the patient in office. Ordered repeat EKG.    9:33 PM - Updated the patient on my consult with Cardiology. Patient will be discharged at this time. He verbalizes agreement with discharge and plan of care.    There was a good response to IV fluids after patient reevaluation.  Patient's heart rate is  improved.    Medical Decision Making: At this point time I do not see any significant EKG abnormalities that would cause syncope or his tachycardia.  His tachycardia appears to be sinus tach.  Patient had chest pain and a syncopal event therefore I felt acute pulmonary embolism was high probability therefore CTA of the chest was done.  This is negative for PE but does show some groundglass opacity such as pneumonitis.  Given the amount of COVID in our community a COVID test is been sent.  I discussed with the patient that he needs to go home and self isolate until his cover test is negative.  We will have the patient follow-up for his palpitations with cardiology.  I think the patient's tachycardia is likely secondary to dehydration.    The patient will return for new or worsening symptoms and is stable at the time of discharge.    DISPOSITION:  Patient will be discharged home in stable condition.    FOLLOW UP:  Nasima Collins P.A.-C.  [address]  [facility] [id number] [phone]    Schedule an appointment as soon as possible for a visit in 1 week      Lam Hagan M.D.  [address]   [address]  [facility] [id number] [phone]    Schedule an appointment as soon as possible for a visit   for your syncope      FINAL IMPRESSION  1. Syncope, unspecified syncope type    2. Tachycardia    3. Dehydration          Lea AGUILA (Jae), am scribing for, and in the presence of, Jimmy Montgomery M.D.    Electronically signed by: Lea Haddad (Jae), 7/14/2020    Jimmy AGUILA M.D. personally performed the services described in this documentation, as scribed by Lea Haddad in my presence, and it is both accurate and complete. C    The note accurately reflects work and decisions made by me.  Jimmy Montgomery M.D.  7/14/2020  10:26 PM

## 2020-07-14 NOTE — ED NOTES
"Pt to room 42 with family. Reviewed and agree with triage note. Pt states that he has a history of tachycardia, but states that heart rate has been \"much higher\" for the last three days. Pt appears diaphoretic, otherwise NAD. Pt provided hospital gown, provided warm blanket and call light within reach. Pt placed on continuous monitors. Chart up for ERP      "

## 2020-07-14 NOTE — TELEPHONE ENCOUNTER
Phone Number Called: [phone] (home)       Call outcome: Did not leave a detailed message. Requested patient to call back.    Message: Left a message to have patient call back

## 2020-07-14 NOTE — TELEPHONE ENCOUNTER
----- Message from Nasima Collins P.A.-C. sent at 7/14/2020  9:22 AM PDT -----  Xray shows a mild issue with breathing.  I recommend a follow up.  Did he go to an urgent care or ER yesterday as recommended?

## 2020-07-14 NOTE — PROGRESS NOTES
Subjective:   Олег Ramirez is a 34 y.o. male who presents for Heart Problem (Tachycardic/Chest Pain.)        34-year-old male presents to urgent care with a chief complaint of chest pain and palpitations over the past 48 hours.  The patient complains of intermittent palpitations over the past year yet states recently worsened over the past 2 days.  The patient had had a visit with the primary care provider and had a chest x-ray performed yesterday which demonstrated atelectasis.  Patient relates an episode 2 nights prior with chest pain and tachycardia which was noted at work for which were called the EMS services.  The patient was evaluated at work by EMS services and was advised of tachycardia at a rate of 157 and the patient deferred ambulance transfer to the emergency department at that time.  The patient has experienced persistent intermittent substernal chest pain and palpitations.  Patient complains of intermittent dyspnea yet denies dyspnea at rest at this time.  Denies cough denies fevers chills or sweats.  Patient states recently cutting back from smoking.  Patient denies recreational drugs.      Chest Pain    This is a new problem. Episode onset: 2 days. The problem occurs intermittently. The problem has been gradually worsening. The pain is present in the substernal region. The quality of the pain is described as sharp. The pain does not radiate. Associated symptoms include palpitations and shortness of breath (Intermittently). Pertinent negatives include no cough, dizziness, fever, nausea or vomiting. He has tried rest for the symptoms. The treatment provided no relief. Risk factors include smoking/tobacco exposure.     PMH:  has a past medical history of GERD (gastroesophageal reflux disease).  MEDS:   Current Outpatient Medications:   •  esomeprazole (NEXIUM) 40 MG delayed-release capsule, Take 1 Cap by mouth every morning before breakfast., Disp: 30 Cap, Rfl: 3  •  rabeprazole (ACIPHEX) 20 MG  "tablet, Take 1 Tab by mouth every day., Disp: 30 Tab, Rfl: 11  ALLERGIES: No Known Allergies  SURGHX:   Past Surgical History:   Procedure Laterality Date   • APPENDECTOMY LAPAROSCOPIC  9/14/2018    Procedure: APPENDECTOMY LAPAROSCOPIC;  Surgeon: Baljit Mondragon M.D.;  Location: SURGERY [facility];  Service: General   • APPENDECTOMY       SOCHX:  reports that he has quit smoking. He smoked 0.50 packs per day. He has never used smokeless tobacco. He reports current alcohol use. He reports that he does not use drugs.  FH:   Family History   Problem Relation Age of Onset   • Cancer Paternal Grandfather         esophageal     Review of Systems   Constitutional: Negative for chills and fever.   HENT: Negative for sore throat.    Respiratory: Positive for shortness of breath (Intermittently). Negative for cough.    Cardiovascular: Positive for chest pain and palpitations. Negative for leg swelling.   Gastrointestinal: Negative for nausea and vomiting.   Musculoskeletal: Negative for myalgias.   Skin: Negative for rash.   Neurological: Negative for dizziness.        Objective:   /82   Pulse (!) 144   Temp 36.8 °C (98.3 °F) (Temporal)   Resp 14   Ht 1.727 m (5' 8\")   Wt 78.9 kg (174 lb)   SpO2 94%   BMI 26.46 kg/m²   Physical Exam  Vitals signs and nursing note reviewed.   Constitutional:       General: He is not in acute distress.     Appearance: He is well-developed.   HENT:      Head: Normocephalic and atraumatic.      Right Ear: External ear normal.      Left Ear: External ear normal.      Nose: Nose normal.      Mouth/Throat:      Mouth: Mucous membranes are moist.   Eyes:      Conjunctiva/sclera: Conjunctivae normal.   Cardiovascular:      Rate and Rhythm: Tachycardia present.   Pulmonary:      Effort: Pulmonary effort is normal. No respiratory distress.      Breath sounds: Normal breath sounds. No wheezing or rhonchi.   Abdominal:      General: There is no distension.   Musculoskeletal: Normal " range of motion.   Skin:     General: Skin is warm and dry.   Neurological:      General: No focal deficit present.      Mental Status: He is alert and oriented to person, place, and time. Mental status is at baseline.      Gait: Gait (gait at baseline) normal.   Psychiatric:         Mood and Affect: Mood is anxious.         Judgment: Judgment normal.     Result Notes for DX-CHEST-2 VIEWS     Notes recorded by Kennedy Hernandez on 7/14/2020 at 11:47 AM PDT   Phone Number Called: [phone] (home)     Call outcome: left message for patient to call back regarding message below.     ------     Notes recorded by Nasima Collins P.A.-C. on 7/14/2020 at 9:22 AM PDT   Xray shows a mild issue with breathing.  I recommend a follow up.  Did he go to an urgent care or ER yesterday as recommended?    Follow-up Communication for DX-CHEST-2 VIEWS                DX-CHEST-2 VIEWS   Order: [id number]   Status:  Final result   Visible to patient:  Yes (MyChart) Next appt:  None Dx:  Palpitations   Details     Reading Physician  Reading Date  Result Priority    Davian Valdez M.D.  [phone]  7/13/2020  Routine       Narrative & Impression         7/13/2020 10:02 AM     HISTORY/REASON FOR EXAM:  Palpitations     TECHNIQUE/EXAM DESCRIPTION:  PA and lateral views of the chest.     COMPARISON:  None     FINDINGS:     The heart is not enlarged. No focal consolidation, pleural effusion or pneumothorax is identified.  Costophrenic angles are clear.  There is minimal linear lingular atelectasis. Degenerative changes are seen in the spine.           IMPRESSION:     Minimal linear lingular atelectasis.             Last Resulted: 07/13/20 10:17 AM            EKG: Sinus tachycardia 133, no ST segment elevation no ST segment depression no T wave inversion    Medical decision-making/course: In the context of the persistent intermittent chest pain with more frequent and symptomatic tachycardia, there is a concern for pulmonary embolism, occult  coronary artery syndrome, remote possibility of aortic dissection, and/or dysrhythmia which could require inpatient admission for observation and further work-up and accordingly emergency department evaluation and management is warranted.  The patient deferred EMS ambulance transfer and requested transport by private vehicle.  The [facility] emergency department was advised          Assessment/Plan:   1. Chest pain, unspecified type    2. Tachycardia      Emergency department evaluation and management.    This patient was not identified to have risk factors or symptoms concerning for COVID-19.  Personal protective equipment including N95 surgical respirator, goggles, and gloves were used during this encounter.          Please note that this dictation was created using voice recognition software. I have worked with consultants from the vendor as well as technical experts from [facility] to optimize the interface. I have made every reasonable attempt to correct obvious errors, but I expect that there are errors of grammar and possibly content that I did not discover before finalizing the note.

## 2020-07-14 NOTE — Clinical Note
Олег Ramirez was seen and treated in our emergency department on 7/14/2020.  He may return to work on 07/24/2020.  Off work until his Covid test comes back      If you have any questions or concerns, please don't hesitate to call.      Jimmy Montgomery M.D.

## 2020-07-15 LAB
SARS-COV-2 RNA RESP QL NAA+PROBE: NOTDETECTED
SPECIMEN SOURCE: NORMAL

## 2020-07-15 NOTE — DISCHARGE INSTRUCTIONS
Return if you have new or different chest pain, shortness of breath, productive cough, or another sycope.     You likely have a viral illness and may have COVID-19. At this point we do not have your test results.  Therefore, COVID-19 is not ruled out and you will need to remain in home quarantine until all three of the following are true:  You are 10 days from symptom onset  your symptoms are improving   you have been fever free for at least 72hours without taking any Tylenol or ibuprofen.    If you develop significant shortness of breath, meaning that it is difficult for you to walk even short distances without having to stop and catch your breath, or you become severely dizzy and this is persistent then please return to the emergency department.    I recommend you get a home pulse oximeter.  Return if your oxygenation is less than 90.

## 2020-07-15 NOTE — ED NOTES
Patient up and ambulatory to restroom to obtain a urine sample.  Updated on PO.  No further needs at this time.

## 2020-07-15 NOTE — ED NOTES
"Олег Ramirez D/C'ken.  Discharge instructions including the importance of hydration, the use of OTC medications, information on Syncope, tachycardia, and dehydration and the proper follow up recommendations have been provided to the pt.  Pt states understanding.  Pt states all questions have been answered.  A copy of the discharge instructions have been provided to pt.  A signed copy is in the chart.  Pt ambulatory out of department; pt in NAD, awake, alert, interactive and age appropriate.  Patient is aware of the need to return to the ER for any concerns or changes in condition. /67   Pulse 94   Temp 36.4 °C (97.6 °F) (Temporal)   Resp 18   Ht 1.727 m (5' 8\")   Wt 81.6 kg (179 lb 14.3 oz)   SpO2 98%   BMI 27.35 kg/m²     "

## 2020-07-15 NOTE — ED NOTES
Bedside report completed with ANAIS Solis.  POC reviewed with all questions and concerns addressed.

## 2020-07-16 NOTE — ED NOTES
COVID-19 Test Follow Up  07/16/20      Patient tested negative for COVID-19. I have informed the patient of the result by My Chart message. Encouraged to stay at home until no fever for 72 hours without the use of fever reducing medications and symptoms improving. Informed there is no need to further self-isolate for 14 days for COVID-19 unless otherwise directed by the Health Dept.     SARS-CoV-2 Source  NP Swab     SARS-CoV-2 by PCR  NotDetected        Mary Jane Collier, PharmD

## 2020-07-28 ENCOUNTER — TELEPHONE (OUTPATIENT)
Dept: MEDICAL GROUP | Facility: CLINIC | Age: 35
End: 2020-07-28

## 2020-07-28 ENCOUNTER — OFFICE VISIT (OUTPATIENT)
Dept: MEDICAL GROUP | Facility: CLINIC | Age: 35
End: 2020-07-28
Payer: COMMERCIAL

## 2020-07-28 ENCOUNTER — HOSPITAL ENCOUNTER (OUTPATIENT)
Facility: MEDICAL CENTER | Age: 35
End: 2020-07-28
Attending: PHYSICIAN ASSISTANT
Payer: COMMERCIAL

## 2020-07-28 VITALS
WEIGHT: 180.8 LBS | DIASTOLIC BLOOD PRESSURE: 82 MMHG | SYSTOLIC BLOOD PRESSURE: 136 MMHG | TEMPERATURE: 97.9 F | BODY MASS INDEX: 27.49 KG/M2 | OXYGEN SATURATION: 95 % | RESPIRATION RATE: 18 BRPM | HEART RATE: 118 BPM

## 2020-07-28 DIAGNOSIS — R00.2 PALPITATIONS: ICD-10-CM

## 2020-07-28 DIAGNOSIS — I45.81 PROLONGED QT SYNDROME: ICD-10-CM

## 2020-07-28 DIAGNOSIS — R00.0 TACHYCARDIA: ICD-10-CM

## 2020-07-28 DIAGNOSIS — R55 SYNCOPE AND COLLAPSE: ICD-10-CM

## 2020-07-28 LAB
BASOPHILS # BLD AUTO: 0.5 % (ref 0–1.8)
BASOPHILS # BLD: 0.05 K/UL (ref 0–0.12)
EOSINOPHIL # BLD AUTO: 0.41 K/UL (ref 0–0.51)
EOSINOPHIL NFR BLD: 4.4 % (ref 0–6.9)
ERYTHROCYTE [DISTWIDTH] IN BLOOD BY AUTOMATED COUNT: 45 FL (ref 35.9–50)
HCT VFR BLD AUTO: 51.2 % (ref 42–52)
HGB BLD-MCNC: 16 G/DL (ref 14–18)
IMM GRANULOCYTES # BLD AUTO: 0.03 K/UL (ref 0–0.11)
IMM GRANULOCYTES NFR BLD AUTO: 0.3 % (ref 0–0.9)
LYMPHOCYTES # BLD AUTO: 2.61 K/UL (ref 1–4.8)
LYMPHOCYTES NFR BLD: 28 % (ref 22–41)
MCH RBC QN AUTO: 27.2 PG (ref 27–33)
MCHC RBC AUTO-ENTMCNC: 31.3 G/DL (ref 33.7–35.3)
MCV RBC AUTO: 87.1 FL (ref 81.4–97.8)
MONOCYTES # BLD AUTO: 0.9 K/UL (ref 0–0.85)
MONOCYTES NFR BLD AUTO: 9.6 % (ref 0–13.4)
NEUTROPHILS # BLD AUTO: 5.33 K/UL (ref 1.82–7.42)
NEUTROPHILS NFR BLD: 57.2 % (ref 44–72)
NRBC # BLD AUTO: 0 K/UL
NRBC BLD-RTO: 0 /100 WBC
PLATELET # BLD AUTO: 349 K/UL (ref 164–446)
PMV BLD AUTO: 10.7 FL (ref 9–12.9)
RBC # BLD AUTO: 5.88 M/UL (ref 4.7–6.1)
T4 FREE SERPL-MCNC: 1.22 NG/DL (ref 0.93–1.7)
THYROPEROXIDASE AB SERPL-ACNC: <9 IU/ML (ref 0–9)
TSH SERPL DL<=0.005 MIU/L-ACNC: 2.13 UIU/ML (ref 0.38–5.33)
WBC # BLD AUTO: 9.3 K/UL (ref 4.8–10.8)

## 2020-07-28 PROCEDURE — 85025 COMPLETE CBC W/AUTO DIFF WBC: CPT

## 2020-07-28 PROCEDURE — 99214 OFFICE O/P EST MOD 30 MIN: CPT | Performed by: PHYSICIAN ASSISTANT

## 2020-07-28 PROCEDURE — 86800 THYROGLOBULIN ANTIBODY: CPT

## 2020-07-28 PROCEDURE — 84443 ASSAY THYROID STIM HORMONE: CPT

## 2020-07-28 PROCEDURE — 86376 MICROSOMAL ANTIBODY EACH: CPT

## 2020-07-28 PROCEDURE — 84439 ASSAY OF FREE THYROXINE: CPT

## 2020-07-28 RX ORDER — PROPRANOLOL HYDROCHLORIDE 10 MG/1
10 TABLET ORAL 2 TIMES DAILY
Qty: 60 TAB | Refills: 0 | Status: SHIPPED | OUTPATIENT
Start: 2020-07-28 | End: 2020-08-12

## 2020-07-28 ASSESSMENT — FIBROSIS 4 INDEX: FIB4 SCORE: 0.45

## 2020-07-28 NOTE — PROGRESS NOTES
cc:  tachycardia    Subjective:     Олег Ramirez is a 34 y.o. male presenting for tachycardia      Patient presents to the office for tachycardia.  Patient was seen in the ER on July 14th.  He had an elevated heart rate.  He states that he may have had a syncopal event at work.  He states that heart rate was 157 the day that he was sent home.  Sometimes he will feel like his heart is beating fast.  It is hard to know if symptoms are worse at work but he has a greater affect at work where he is had syncopal or near syncopal events.  He is hesitant to return to work because of how he is feeling and is requesting further follow-up and evaluation.    Review of systems:  See above.   Denies any symptoms unless previously indicated.        Current Outpatient Medications:   •  propranolol (INDERAL) 10 MG Tab, Take 1 Tab by mouth 2 times a day., Disp: 60 Tab, Rfl: 0  •  esomeprazole (NEXIUM) 40 MG delayed-release capsule, Take 1 Cap by mouth every morning before breakfast., Disp: 30 Cap, Rfl: 3    Allergies, past medical history, past surgical history, family history, social history reviewed and updated    Objective:     Vitals: /82 (BP Location: Left arm, Patient Position: Sitting, BP Cuff Size: Adult)   Pulse (!) 118   Temp 36.6 °C (97.9 °F) (Temporal)   Resp 18   Wt 82 kg (180 lb 12.8 oz)   SpO2 95%   BMI 27.49 kg/m²   General: Alert, pleasant, NAD  EYES:   PERRL, EOMI, no icterus or pallor.  Conjunctivae and lids normal.   HENT:  Normocephalic.  External ears normal.   Neck supple.   Heart: Tachycardic rate and rhythm no murmurs appreciated.  Respiratory: Normal respiratory effort.  Clear to auscultation bilaterally.  Abdomen: Not obese  Skin: Warm, dry, no rashes.  Musculoskeletal: Gait is normal.  Moves all extremities well.    Extremities: Normal range of motion all extremities.   Neurological: No tremors, sensation grossly intact,  CN2-12 intact.  Psych:  Affect/mood is normal, judgement is good,  memory is intact, grooming is appropriate.    Assessment/Plan:     Олег was seen today for follow-up, paperwork and bradycardia.    Diagnoses and all orders for this visit:    Palpitations  -     TSH; Future  -     FREE THYROXINE; Future  -     REFERRAL TO CARDIOLOGY General Cardiology MD  -     THYROID PEROXIDASE  (TPO) AB; Future  -     ANTITHYROGLOBULIN AB; Future  -     CBC WITH DIFFERENTIAL; Future  -     propranolol (INDERAL) 10 MG Tab; Take 1 Tab by mouth 2 times a day.    Prolonged QT syndrome  -     TSH; Future  -     FREE THYROXINE; Future  -     REFERRAL TO CARDIOLOGY General Cardiology MD  -     THYROID PEROXIDASE  (TPO) AB; Future  -     ANTITHYROGLOBULIN AB; Future  -     CBC WITH DIFFERENTIAL; Future  -     propranolol (INDERAL) 10 MG Tab; Take 1 Tab by mouth 2 times a day.    Tachycardia  -     TSH; Future  -     FREE THYROXINE; Future  -     REFERRAL TO CARDIOLOGY General Cardiology MD  -     THYROID PEROXIDASE  (TPO) AB; Future  -     ANTITHYROGLOBULIN AB; Future  -     CBC WITH DIFFERENTIAL; Future  -     propranolol (INDERAL) 10 MG Tab; Take 1 Tab by mouth 2 times a day.      I am concerned that symptoms are not improving.  Work also exacerbate symptoms.  Thyroid testing was borderline for hyperthyroidism.  Therefore we will repeat testing with antibodies.  Literature indicates nadolol or propranolol can be given for prolonged QT syndrome and so we will start with propranolol 10 mg twice a day and follow-up in a week.  I will keep him off of work until he meets with cardiology as I do not believe it is safe for him to return at this time.  I have discussed potential side effects with patient.  I have discussed the literature with patient as well regarding propranolol.  He will have his company send us FMLA paperwork and we will follow-up in 1 week.  This was a 30-minute appointment with greater than 50% spent in education and counseling.    Addendum 7-: Patient is also reported  syncope/near syncopal episodes and will be included with this note with coding      No follow-ups on file.    Please note that this dictation was created using voice recognition software. I have made every reasonable attempt to correct obvious errors, but expect that there are errors of grammar and possible content that I did not discover before finalizing note.

## 2020-07-28 NOTE — LETTER
July 28, 2020       Patient: Олег Ramirez   YOB: 1985   Date of Visit: 7/28/2020         To Whom It May Concern:    In my medical opinion, I recommend that Олег Ramirez remain out of work until he meets with cardiology.    If you have any questions or concerns, please don't hesitate to call [phone]          Sincerely,          Nasima Collins P.A.-C.  Electronically Signed

## 2020-07-28 NOTE — TELEPHONE ENCOUNTER
Sent over the chart notes for 7/14/2020 UC, 7/14/2020 ED, and 7/28/2020 PCP note to [employer] for FMLA approval

## 2020-07-29 PROBLEM — R55 SYNCOPE AND COLLAPSE: Status: ACTIVE | Noted: 2020-07-29

## 2020-07-30 LAB — THYROGLOB AB SERPL-ACNC: <0.9 IU/ML (ref 0–4)

## 2020-08-03 ENCOUNTER — OFFICE VISIT (OUTPATIENT)
Dept: MEDICAL GROUP | Facility: CLINIC | Age: 35
End: 2020-08-03
Payer: COMMERCIAL

## 2020-08-03 VITALS
DIASTOLIC BLOOD PRESSURE: 70 MMHG | HEART RATE: 102 BPM | HEIGHT: 68 IN | BODY MASS INDEX: 27.28 KG/M2 | TEMPERATURE: 98.1 F | SYSTOLIC BLOOD PRESSURE: 112 MMHG | RESPIRATION RATE: 16 BRPM | OXYGEN SATURATION: 95 % | WEIGHT: 180 LBS

## 2020-08-03 DIAGNOSIS — I45.81 PROLONGED QT SYNDROME: ICD-10-CM

## 2020-08-03 DIAGNOSIS — R55 SYNCOPE AND COLLAPSE: ICD-10-CM

## 2020-08-03 DIAGNOSIS — R00.2 PALPITATIONS: ICD-10-CM

## 2020-08-03 DIAGNOSIS — R00.0 TACHYCARDIA: ICD-10-CM

## 2020-08-03 PROCEDURE — 99213 OFFICE O/P EST LOW 20 MIN: CPT | Performed by: PHYSICIAN ASSISTANT

## 2020-08-03 ASSESSMENT — FIBROSIS 4 INDEX: FIB4 SCORE: 0.4

## 2020-08-03 NOTE — PROGRESS NOTES
"cc:  Follow up tachycardia    Subjective:     Олег Ramirez is a 34 y.o. male presenting for follow up tachycardia      Patient presents to the office for follow up tachycardia.  Patient states that he has seen some improvement in his symptoms.  Patient states that he has only been on the medication for 2 days. Patient denies feeling light headed and dizzy.  He has not had any symptoms where he feels that he will pass out.     Review of systems:  See above.   Denies any symptoms unless previously indicated.        Current Outpatient Medications:   •  propranolol (INDERAL) 10 MG Tab, Take 1 Tab by mouth 2 times a day., Disp: 60 Tab, Rfl: 0  •  esomeprazole (NEXIUM) 40 MG delayed-release capsule, Take 1 Cap by mouth every morning before breakfast., Disp: 30 Cap, Rfl: 3    Allergies, past medical history, past surgical history, family history, social history reviewed and updated    Objective:     Vitals: /70 (BP Location: Left arm, Patient Position: Sitting, BP Cuff Size: Adult)   Pulse (!) 102   Temp 36.7 °C (98.1 °F) (Temporal)   Resp 16   Ht 1.727 m (5' 8\")   Wt 81.6 kg (180 lb)   SpO2 95%   BMI 27.37 kg/m²   General: Alert, pleasant, NAD  EYES:   PERRL, EOMI, no icterus or pallor.  Conjunctivae and lids normal.   HENT:  Normocephalic.  External ears normal. Neck supple.    Heart: Regular rate and rhythm.  S1 and S2 normal.  No murmurs appreciated.  Respiratory: Normal respiratory effort.  Clear to auscultation bilaterally.  Abdomen:  Not obese  Skin: Warm, dry, no rashes.  Musculoskeletal: Gait is normal.  Moves all extremities well.    Extremities: normal range of motion all extremities.   Neurological: No tremors, sensation grossly intact,  CN2-12 intact.  Psych:  Affect/mood is normal, judgement is good, memory is intact, grooming is appropriate.    Assessment/Plan:     Олег was seen today for medication management and lab results.    Diagnoses and all orders for this " visit:    Palpitations    Syncope and collapse    Prolonged QT syndrome    Tachycardia      Patient is improved somewhat.  However he is only been on the medications for 2 days.  We are still trying to obtain a sooner appointment with cardiology.  He will contact cardiology to see if this is possible.  At this time we will continue with medication and follow-up in approximately 10 days.  If he is able to obtain a sooner appointment with cardiology, he will cancel our appointment and follow-up with them.  We did fill out paperwork that is needed for child custody.  Form will be scanned into the chart for further reference.  He will contact us sooner with any issues or concerns.      Return in about 9 days (around 8/12/2020).    Please note that this dictation was created using voice recognition software. I have made every reasonable attempt to correct obvious errors, but expect that there are errors of grammar and possible content that I did not discover before finalizing note.

## 2020-08-06 ENCOUNTER — TELEPHONE (OUTPATIENT)
Dept: CARDIOLOGY | Facility: MEDICAL CENTER | Age: 35
End: 2020-08-06

## 2020-08-06 NOTE — TELEPHONE ENCOUNTER
Chart Prep    Spoke to patient checking to see if he had any previous cardiac records other than ER visit. Patient confirms he does not. Patient stated he has a referral and does not understand why he cannot be seen sooner than 8/19. I reviewed MIAN and Dr. Fritz's schedule. Patient understands that there is not a sooner opening at this time. Will contact him if there is a cancellation.    Confirmed appt date and time for 8/19/2020 with MIAN at 8:45am.

## 2020-08-12 ENCOUNTER — OFFICE VISIT (OUTPATIENT)
Dept: MEDICAL GROUP | Facility: CLINIC | Age: 35
End: 2020-08-12
Payer: COMMERCIAL

## 2020-08-12 VITALS
DIASTOLIC BLOOD PRESSURE: 82 MMHG | OXYGEN SATURATION: 94 % | TEMPERATURE: 98.3 F | HEIGHT: 68 IN | SYSTOLIC BLOOD PRESSURE: 120 MMHG | RESPIRATION RATE: 16 BRPM | WEIGHT: 180 LBS | BODY MASS INDEX: 27.28 KG/M2 | HEART RATE: 102 BPM

## 2020-08-12 DIAGNOSIS — R00.2 PALPITATIONS: ICD-10-CM

## 2020-08-12 DIAGNOSIS — R55 SYNCOPE AND COLLAPSE: ICD-10-CM

## 2020-08-12 DIAGNOSIS — R00.0 TACHYCARDIA: ICD-10-CM

## 2020-08-12 DIAGNOSIS — I45.81 PROLONGED QT SYNDROME: ICD-10-CM

## 2020-08-12 PROCEDURE — 99213 OFFICE O/P EST LOW 20 MIN: CPT | Performed by: PHYSICIAN ASSISTANT

## 2020-08-12 RX ORDER — PROPRANOLOL HYDROCHLORIDE 20 MG/1
20 TABLET ORAL 2 TIMES DAILY
Qty: 60 TAB | Refills: 2 | Status: SHIPPED | OUTPATIENT
Start: 2020-08-12 | End: 2020-08-27

## 2020-08-12 ASSESSMENT — FIBROSIS 4 INDEX: FIB4 SCORE: 0.4

## 2020-08-19 ENCOUNTER — OFFICE VISIT (OUTPATIENT)
Dept: CARDIOLOGY | Facility: MEDICAL CENTER | Age: 35
End: 2020-08-19
Payer: COMMERCIAL

## 2020-08-19 VITALS
BODY MASS INDEX: 27.74 KG/M2 | HEIGHT: 68 IN | WEIGHT: 183 LBS | OXYGEN SATURATION: 95 % | SYSTOLIC BLOOD PRESSURE: 122 MMHG | DIASTOLIC BLOOD PRESSURE: 74 MMHG | HEART RATE: 99 BPM

## 2020-08-19 DIAGNOSIS — R00.2 PALPITATIONS: ICD-10-CM

## 2020-08-19 PROCEDURE — 99204 OFFICE O/P NEW MOD 45 MIN: CPT | Performed by: INTERNAL MEDICINE

## 2020-08-19 ASSESSMENT — FIBROSIS 4 INDEX: FIB4 SCORE: 0.4

## 2020-08-19 NOTE — PROGRESS NOTES
Arrhythmia Clinic Note (New patient)     DOS: 8/19/2020    Referring physician: KAVITHA Collins    Chief complaint/Reason for consult: Palpitations    HPI: 33 y/o M with history of reflux disease, referred for evaluation of palpitations.  He has been having worsening palpitations over the past several weeks.  He has felt dizzy like he is going to pass out.  He noted his heart rate was 157.  He was sent to the emergency room was admitted found to have negative cardiac work-up.  He was ruled out for pulmonary embolism.  He notes his symptoms are slightly better ever since starting propranolol.  He notes his heart rate is still high however.  He says he gets up over 140 with little exertion.  He denies chest pain.  Denies shortness of breath on exertion.  Denies leg swelling.    ROS (+ highlighted in bold):  Constitutional: Fevers/chills/fatigue/weightloss  HEENT: Blurry vision/eye pain/sore throat/hearing loss  Respiratory: Shortness of breath/cough  Cardiovascular: Chest pain/palpitations/edema/orthopnea/syncope  GI: Nausea/vomitting/diarrhea  MSK: Arthralgias/myagias/muscle weakness  Skin: Rash/sores  Neurological: Numbness/tremors/vertigo  Endocrine: Excessive thirst/polyuria/cold intolerance/heat intolerance  Psych: Depression/anxiety    Past Medical History:   Diagnosis Date   • GERD (gastroesophageal reflux disease)        Past Surgical History:   Procedure Laterality Date   • APPENDECTOMY LAPAROSCOPIC  9/14/2018    Procedure: APPENDECTOMY LAPAROSCOPIC;  Surgeon: Baljit Mondragon M.D.;  Location: SURGERY [facility];  Service: General   • APPENDECTOMY         Social History     Socioeconomic History   • Marital status: Single     Spouse name: Not on file   • Number of children: Not on file   • Years of education: Not on file   • Highest education level: Not on file   Occupational History   • Not on file   Social Needs   • Financial resource strain: Not on file   • Food insecurity     Worry: Not on file      "Inability: Not on file   • Transportation needs     Medical: Not on file     Non-medical: Not on file   Tobacco Use   • Smoking status: Former Smoker     Packs/day: 0.50   • Smokeless tobacco: Never Used   Substance and Sexual Activity   • Alcohol use: Yes     Comment: occasionally   • Drug use: No   • Sexual activity: Not on file   Lifestyle   • Physical activity     Days per week: Not on file     Minutes per session: Not on file   • Stress: Not on file   Relationships   • Social connections     Talks on phone: Not on file     Gets together: Not on file     Attends Faith service: Not on file     Active member of club or organization: Not on file     Attends meetings of clubs or organizations: Not on file     Relationship status: Not on file   • Intimate partner violence     Fear of current or ex partner: Not on file     Emotionally abused: Not on file     Physically abused: Not on file     Forced sexual activity: Not on file   Other Topics Concern   • Not on file   Social History Narrative   • Not on file       Family History   Problem Relation Age of Onset   • Cancer Paternal Grandfather         esophageal       No Known Allergies    Current Outpatient Medications   Medication Sig Dispense Refill   • propranolol (INDERAL) 20 MG Tab Take 1 Tab by mouth 2 times a day. 60 Tab 2   • esomeprazole (NEXIUM) 40 MG delayed-release capsule Take 1 Cap by mouth every morning before breakfast. 30 Cap 3     No current facility-administered medications for this visit.        Physical Exam:  Vitals:    08/19/20 0856   BP: 122/74   BP Location: Left arm   Patient Position: Sitting   BP Cuff Size: Adult   Pulse: 99   SpO2: 95%   Weight: 83 kg (183 lb)   Height: 1.727 m (5' 8\")     General appearance: NAD, conversant   Eyes: anicteric sclerae, moist conjunctivae; no lid-lag; PERRLA  HENT: Atraumatic; oropharynx clear with moist mucous membranes and no mucosal ulcerations; normal hard and soft palate  Neck: Trachea midline; FROM, " supple, no thyromegaly or lymphadenopathy  Lungs: CTA, with normal respiratory effort and no intercostal retractions  CV: RRR, no MRGs, no JVD   Abdomen: Soft, non-tender; no masses or HSM  Extremities: No peripheral edema or extremity lymphadenopathy  Skin: Normal temperature, turgor and texture; no rash, ulcers or subcutaneous nodules  Psych: Anxious affect, alert and oriented to person, place and time    Data:  Lipids:   No results found for: CHOLSTRLTOT, TRIGLYCERIDE, HDL, LDL     BMP:  Lab Results   Component Value Date/Time    SODIUM 142 07/14/2020 1457    POTASSIUM 3.8 07/14/2020 1457    CHLORIDE 102 07/14/2020 1457    CO2 24 07/14/2020 1457    GLUCOSE 85 07/14/2020 1457    BUN 17 07/14/2020 1457    CREATININE 0.85 07/14/2020 1457    CALCIUM 10.0 07/14/2020 1457    ANION 16.0 07/14/2020 1457        TSH:   Lab Results   Component Value Date/Time    TSHULTRASEN 2.130 07/28/2020 0930        THYROXINE (T4):   No results found for: FREEDIR     CBC:   Lab Results   Component Value Date/Time    WBC 9.3 07/28/2020 09:30 AM    RBC 5.88 07/28/2020 09:30 AM    HEMOGLOBIN 16.0 07/28/2020 09:30 AM    HEMATOCRIT 51.2 07/28/2020 09:30 AM    MCV 87.1 07/28/2020 09:30 AM    MCH 27.2 07/28/2020 09:30 AM    MCHC 31.3 (L) 07/28/2020 09:30 AM    RDW 45.0 07/28/2020 09:30 AM    PLATELETCT 349 07/28/2020 09:30 AM    MPV 10.7 07/28/2020 09:30 AM    NEUTSPOLYS 57.20 07/28/2020 09:30 AM    LYMPHOCYTES 28.00 07/28/2020 09:30 AM    MONOCYTES 9.60 07/28/2020 09:30 AM    EOSINOPHILS 4.40 07/28/2020 09:30 AM    BASOPHILS 0.50 07/28/2020 09:30 AM    IMMGRAN 0.30 07/28/2020 09:30 AM    NRBC 0.00 07/28/2020 09:30 AM    NEUTS 5.33 07/28/2020 09:30 AM    LYMPHS 2.61 07/28/2020 09:30 AM    MONOS 0.90 (H) 07/28/2020 09:30 AM    EOS 0.41 07/28/2020 09:30 AM    BASO 0.05 07/28/2020 09:30 AM    IMMGRANAB 0.03 07/28/2020 09:30 AM    NRBCAB 0.00 07/28/2020 09:30 AM        CBC w/o DIFF  Lab Results   Component Value Date/Time    WBC 9.3 07/28/2020  09:30 AM    RBC 5.88 07/28/2020 09:30 AM    HEMOGLOBIN 16.0 07/28/2020 09:30 AM    MCV 87.1 07/28/2020 09:30 AM    MCH 27.2 07/28/2020 09:30 AM    MCHC 31.3 (L) 07/28/2020 09:30 AM    RDW 45.0 07/28/2020 09:30 AM    MPV 10.7 07/28/2020 09:30 AM       CT pulmonary embolism protocol in the hospital was reviewed: No evidence of pulmonary embolism    EKG interpreted by me: Sinus rhythm,     Impression/Plan:  1. Palpitations  Cardiac Event Monitor    EC-ECHOCARDIOGRAM COMPLETE W/O CONT     1.  Borderline prolonged QT  2.  Palpitations  3.  Sinus tachycardia    -His symptoms are daily at this point, will order 48-hour Holter monitor.  -Order echocardiogram as well to rule out structural dysfunction.  -If above testing is normal, will defer further work-up to primary care.    Colton Marino MD  Cardiac Electrophysiology

## 2020-08-19 NOTE — PROGRESS NOTES
No chief complaint on file.      Subjective:   Олег Ramirez is a 34 y.o. male who presents today     Past Medical History:   Diagnosis Date   • GERD (gastroesophageal reflux disease)      Past Surgical History:   Procedure Laterality Date   • APPENDECTOMY LAPAROSCOPIC  9/14/2018    Procedure: APPENDECTOMY LAPAROSCOPIC;  Surgeon: Baljit Mondragon M.D.;  Location: SURGERY [facility];  Service: General   • APPENDECTOMY       Family History   Problem Relation Age of Onset   • Cancer Paternal Grandfather         esophageal     Social History     Socioeconomic History   • Marital status: Single     Spouse name: Not on file   • Number of children: Not on file   • Years of education: Not on file   • Highest education level: Not on file   Occupational History   • Not on file   Social Needs   • Financial resource strain: Not on file   • Food insecurity     Worry: Not on file     Inability: Not on file   • Transportation needs     Medical: Not on file     Non-medical: Not on file   Tobacco Use   • Smoking status: Former Smoker     Packs/day: 0.50   • Smokeless tobacco: Never Used   Substance and Sexual Activity   • Alcohol use: Yes     Comment: occasionally   • Drug use: No   • Sexual activity: Not on file   Lifestyle   • Physical activity     Days per week: Not on file     Minutes per session: Not on file   • Stress: Not on file   Relationships   • Social connections     Talks on phone: Not on file     Gets together: Not on file     Attends [employer]oration service: Not on file     Active member of club or organization: Not on file     Attends meetings of clubs or organizations: Not on file     Relationship status: Not on file   • Intimate partner violence     Fear of current or ex partner: Not on file     Emotionally abused: Not on file     Physically abused: Not on file     Forced sexual activity: Not on file   Other Topics Concern   • Not on file   Social History Narrative   • Not on file     No Known  Allergies  Outpatient Encounter Medications as of 8/19/2020   Medication Sig Dispense Refill   • propranolol (INDERAL) 20 MG Tab Take 1 Tab by mouth 2 times a day. 60 Tab 2   • esomeprazole (NEXIUM) 40 MG delayed-release capsule Take 1 Cap by mouth every morning before breakfast. 30 Cap 3     No facility-administered encounter medications on file as of 8/19/2020.      ROS     Objective:   There were no vitals taken for this visit.    Physical Exam    Assessment:   No diagnosis found.    Medical Decision Making:  Today's Assessment / Status / Plan:

## 2020-08-20 ENCOUNTER — NON-PROVIDER VISIT (OUTPATIENT)
Dept: CARDIOLOGY | Facility: MEDICAL CENTER | Age: 35
End: 2020-08-20
Payer: COMMERCIAL

## 2020-08-20 DIAGNOSIS — R00.2 PALPITATIONS: ICD-10-CM

## 2020-08-20 DIAGNOSIS — R00.0 TACHYCARDIA: ICD-10-CM

## 2020-08-20 LAB — EKG IMPRESSION: NORMAL

## 2020-08-20 PROCEDURE — 93224 XTRNL ECG REC UP TO 48 HRS: CPT | Performed by: INTERNAL MEDICINE

## 2020-08-26 ENCOUNTER — PATIENT MESSAGE (OUTPATIENT)
Dept: MEDICAL GROUP | Facility: CLINIC | Age: 35
End: 2020-08-26

## 2020-08-27 ENCOUNTER — OFFICE VISIT (OUTPATIENT)
Dept: MEDICAL GROUP | Facility: CLINIC | Age: 35
End: 2020-08-27
Payer: COMMERCIAL

## 2020-08-27 VITALS
DIASTOLIC BLOOD PRESSURE: 80 MMHG | SYSTOLIC BLOOD PRESSURE: 132 MMHG | HEIGHT: 68 IN | BODY MASS INDEX: 27.74 KG/M2 | OXYGEN SATURATION: 94 % | WEIGHT: 183 LBS | TEMPERATURE: 97.4 F | HEART RATE: 95 BPM

## 2020-08-27 DIAGNOSIS — R55 SYNCOPE AND COLLAPSE: ICD-10-CM

## 2020-08-27 DIAGNOSIS — I45.81 PROLONGED QT SYNDROME: ICD-10-CM

## 2020-08-27 DIAGNOSIS — R00.0 TACHYCARDIA: ICD-10-CM

## 2020-08-27 DIAGNOSIS — R00.2 PALPITATIONS: ICD-10-CM

## 2020-08-27 PROCEDURE — 99213 OFFICE O/P EST LOW 20 MIN: CPT | Performed by: PHYSICIAN ASSISTANT

## 2020-08-27 RX ORDER — PROPRANOLOL HYDROCHLORIDE 40 MG/1
40 TABLET ORAL 2 TIMES DAILY
Qty: 60 TAB | Refills: 2 | Status: SHIPPED | OUTPATIENT
Start: 2020-08-27 | End: 2021-09-05

## 2020-08-27 ASSESSMENT — FIBROSIS 4 INDEX: FIB4 SCORE: 0.4

## 2020-08-27 NOTE — PROGRESS NOTES
"cc:  Follow up paperwork    Subjective:     Олег Ramirez is a 34 y.o. male presenting for follow up paperwork      Patient presents to the office for follow up paperwork.  Patient indicates that he is nervous about his results.  He is wondering if we have them and can discuss the results.     Patient states that he has a feeling of a twitch or a bump or a flutter. She is not experiencing it more often.  He states that he has a weird pain in the abdomen.      Review of systems:  See above.   Denies any symptoms unless previously indicated.        Current Outpatient Medications:   •  propranolol (INDERAL) 40 MG Tab, Take 1 Tab by mouth 2 times a day., Disp: 60 Tab, Rfl: 2  •  esomeprazole (NEXIUM) 40 MG delayed-release capsule, Take 1 Cap by mouth every morning before breakfast., Disp: 30 Cap, Rfl: 3    Allergies, past medical history, past surgical history, family history, social history reviewed and updated    Objective:     Vitals: /80 (BP Location: Left arm, Patient Position: Sitting, BP Cuff Size: Adult)   Pulse 95   Temp 36.3 °C (97.4 °F) (Temporal)   Ht 1.727 m (5' 8\")   Wt 83 kg (183 lb)   SpO2 94%   BMI 27.83 kg/m²   General: Alert, pleasant, NAD  EYES:   PERRL, EOMI, no icterus or pallor.  Conjunctivae and lids normal.   HENT:  Normocephalic.  External ears normal. Tympanic membranes pearly, opaque. Neck supple.     Respiratory: Normal respiratory effort.    Abdomen: Not obese  Skin: Warm, dry, no rashes.  Musculoskeletal: Gait is normal.  Moves all extremities well.    Extremities: normal range of motion all extremities.   Neurological: No tremors, sensation grossly intact,  CN2-12 intact.  Psych:  Affect/mood is normal, judgement is good, memory is intact, grooming is appropriate.    Assessment/Plan:     Олег was seen today for paperwork.    Diagnoses and all orders for this visit:    Tachycardia  -     propranolol (INDERAL) 40 MG Tab; Take 1 Tab by mouth 2 times a " "day.  Palpitations  Prolonged QT syndrome  Syncope and collapse    We will increase the propranolol to 40 mg twice a day.  The plan is to follow up in 2 weeks and decide if we need to keep out of work longer.  Hopefully we will \"decreased\" the flutter by increasing the dose.  Once we have the holter monitor report we will notify patient.  We will fill out paperwork for work when it is received.  He will contact us sooner with any issues or concerns.        No follow-ups on file.    Please note that this dictation was created using voice recognition software. I have made every reasonable attempt to correct obvious errors, but expect that there are errors of grammar and possible content that I did not discover before finalizing note.   "

## 2020-09-03 ENCOUNTER — HOSPITAL ENCOUNTER (OUTPATIENT)
Dept: CARDIOLOGY | Facility: MEDICAL CENTER | Age: 35
End: 2020-09-03
Attending: INTERNAL MEDICINE
Payer: COMMERCIAL

## 2020-10-26 ENCOUNTER — OFFICE VISIT (OUTPATIENT)
Dept: MEDICAL GROUP | Facility: CLINIC | Age: 35
End: 2020-10-26
Payer: COMMERCIAL

## 2020-10-26 VITALS
TEMPERATURE: 98.6 F | RESPIRATION RATE: 16 BRPM | BODY MASS INDEX: 27.28 KG/M2 | HEART RATE: 129 BPM | OXYGEN SATURATION: 94 % | HEIGHT: 68 IN | WEIGHT: 180 LBS | SYSTOLIC BLOOD PRESSURE: 130 MMHG | DIASTOLIC BLOOD PRESSURE: 78 MMHG

## 2020-10-26 DIAGNOSIS — K21.9 GASTROESOPHAGEAL REFLUX DISEASE WITHOUT ESOPHAGITIS: ICD-10-CM

## 2020-10-26 PROCEDURE — 99213 OFFICE O/P EST LOW 20 MIN: CPT | Performed by: PHYSICIAN ASSISTANT

## 2020-10-26 RX ORDER — ESOMEPRAZOLE MAGNESIUM 40 MG/1
40 CAPSULE, DELAYED RELEASE ORAL
Qty: 30 CAP | Refills: 3 | Status: SHIPPED | OUTPATIENT
Start: 2020-10-26 | End: 2021-05-09

## 2020-10-26 ASSESSMENT — FIBROSIS 4 INDEX: FIB4 SCORE: 0.42

## 2020-10-26 NOTE — PROGRESS NOTES
"cc:  Acid reflux    Subjective:     Олег Ramirez is a 35 y.o. male presenting for acid reflux      Patient presents to the office for acid reflux.  He has not followed up with gastroenterology.  He states that he is taking an otc Nexium and will sometimes take 2 a day.  He states that he will sometimes wake up choking.  He has not picked up the rx at the pharmacy.  He states that he has been eating acid foods such as fruits and tomatoes.  He states he has been drinking milk.  He denies a family history of lactose intolerance.   He states that he does eat fried foods and cheese.  When he has the pain, it will last for a minute.  He will need to lay down for the pain to improve.     Review of systems:  See above.   Denies any symptoms unless previously indicated.        Current Outpatient Medications:   •  esomeprazole (NEXIUM) 40 MG delayed-release capsule, Take 1 Cap by mouth every morning before breakfast., Disp: 30 Cap, Rfl: 3  •  propranolol (INDERAL) 40 MG Tab, Take 1 Tab by mouth 2 times a day., Disp: 60 Tab, Rfl: 2    Allergies, past medical history, past surgical history, family history, social history reviewed and updated    Objective:     Vitals: /78 (BP Location: Left arm, Patient Position: Sitting, BP Cuff Size: Adult)   Pulse (!) 129   Temp 37 °C (98.6 °F) (Temporal)   Resp 16   Ht 1.727 m (5' 8\")   Wt 81.6 kg (180 lb)   SpO2 94%   BMI 27.37 kg/m²   General: Alert, pleasant, NAD  EYES:   PERRL, EOMI, no icterus or pallor.  Conjunctivae and lids normal.   HENT:  Normocephalic.  External ears normal.  Neck supple.    Respiratory: Normal respiratory effort.   Abdomen: Non-distended, obese, soft, non-tender, no guarding/rebound. Bowel sounds present.  No hepatosplenomegaly.  No hernias. Harpal sign negative.  Skin: Warm, dry, no rashes.  Musculoskeletal: Gait is normal.  Moves all extremities well.    Extremities: normal range of motion all extremities.   Neurological: No tremors, " sensation grossly intact, CN2-12 intact.  Psych:  Affect/mood is normal, judgement is good, memory is intact, grooming is appropriate.    Assessment/Plan:     Олег was seen today for gastrophageal reflux.    Diagnoses and all orders for this visit:    Gastroesophageal reflux disease without esophagitis  -     CBC WITH DIFFERENTIAL; Future  -     Comp Metabolic Panel; Future  -     AMYLASE; Future  -     LIPASE; Future  -     esomeprazole (NEXIUM) 40 MG delayed-release capsule; Take 1 Cap by mouth every morning before breakfast.  -     US-RUQ; Future        Labs have been ordered to evaluate further.  I have refilled patient's Nexium for 40 mg.  His girlfriend sent in a copy of his 20 mg dose.  We will obtain an ultrasound of the gallbladder.  Patient will make an appointment with gastroenterology.  This referral was previously submitted and has been approved.  We will follow-up in 3 to 4 weeks with test results.  Patient must take the medication on a daily basis and must avoid his dietary triggers.    Return in about 4 weeks (around 11/23/2020), or if symptoms worsen or fail to improve, for 3-4 weeks..    Please note that this dictation was created using voice recognition software. I have made every reasonable attempt to correct obvious errors, but expect that there are errors of grammar and possible content that I did not discover before finalizing note.

## 2020-11-15 ENCOUNTER — HOSPITAL ENCOUNTER (EMERGENCY)
Facility: MEDICAL CENTER | Age: 35
End: 2020-11-15
Attending: EMERGENCY MEDICINE
Payer: COMMERCIAL

## 2020-11-15 VITALS
HEART RATE: 110 BPM | TEMPERATURE: 98.8 F | HEIGHT: 68 IN | OXYGEN SATURATION: 99 % | RESPIRATION RATE: 18 BRPM | DIASTOLIC BLOOD PRESSURE: 96 MMHG | BODY MASS INDEX: 26.52 KG/M2 | SYSTOLIC BLOOD PRESSURE: 136 MMHG | WEIGHT: 175 LBS

## 2020-11-15 DIAGNOSIS — R45.851 SUICIDAL IDEATION: ICD-10-CM

## 2020-11-15 LAB
AMPHET UR QL SCN: NEGATIVE
BARBITURATES UR QL SCN: NEGATIVE
BENZODIAZ UR QL SCN: NEGATIVE
BZE UR QL SCN: NEGATIVE
CANNABINOIDS UR QL SCN: NEGATIVE
METHADONE UR QL SCN: NEGATIVE
OPIATES UR QL SCN: NEGATIVE
OXYCODONE UR QL SCN: NEGATIVE
PCP UR QL SCN: NEGATIVE
POC BREATHALIZER: 0 PERCENT (ref 0–0.01)
POC BREATHALIZER: 0 PERCENT (ref 0–0.01)
PROPOXYPH UR QL SCN: NEGATIVE

## 2020-11-15 PROCEDURE — 90791 PSYCH DIAGNOSTIC EVALUATION: CPT | Performed by: PSYCHOLOGIST

## 2020-11-15 PROCEDURE — A9270 NON-COVERED ITEM OR SERVICE: HCPCS | Performed by: EMERGENCY MEDICINE

## 2020-11-15 PROCEDURE — 80307 DRUG TEST PRSMV CHEM ANLYZR: CPT

## 2020-11-15 PROCEDURE — 700102 HCHG RX REV CODE 250 W/ 637 OVERRIDE(OP): Performed by: EMERGENCY MEDICINE

## 2020-11-15 PROCEDURE — 302970 POC BREATHALIZER: Performed by: EMERGENCY MEDICINE

## 2020-11-15 PROCEDURE — 99285 EMERGENCY DEPT VISIT HI MDM: CPT

## 2020-11-15 RX ORDER — OMEPRAZOLE 20 MG/1
20 CAPSULE, DELAYED RELEASE ORAL ONCE
Status: COMPLETED | OUTPATIENT
Start: 2020-11-15 | End: 2020-11-15

## 2020-11-15 RX ADMIN — OMEPRAZOLE 20 MG: 20 CAPSULE, DELAYED RELEASE ORAL at 17:15

## 2020-11-15 RX ADMIN — LIDOCAINE HYDROCHLORIDE 30 ML: 20 SOLUTION OROPHARYNGEAL at 17:15

## 2020-11-15 ASSESSMENT — FIBROSIS 4 INDEX: FIB4 SCORE: 0.42

## 2020-11-15 NOTE — CONSULTS
"[facility]   TRIAGE ASSESSMENT    Name: Олег Ramirez  MRN: [id number]  : 1985  Age: 35 y.o.  Date of assessment: 11/15/2020  PCP: Nasima Collins P.A.-C.  Persons in attendance: Patient    CHIEF COMPLAINT/PRESENTING ISSUE (as stated by pt): \"I was depressed and I had a knife and I thought about taking my life,\" in the presence of his newly ex-girlfriend, who called 911.  He was kicked out of house by the girlfriend of 2 years with whom he has a 3-month-old child.  He has child support payments for one other of his four children, with an upcoming court hearing on that child support . Two remaining children live with his mother or with their mother.  He has a car payment.    Pt complained of being challenged by stress. \"Just feeling overwhelmed.\"\"All that stuff is overwhelming me.\"  \"Feeling l'm not worth anyone's time.\"     What is pt hoping for today?  \"I don't know. To be happier.\" \"Maybe talking to somebody, that's what I need.\" Pt appears to have expected that by coming to the ER he would have instant access to a combination of therapy and problem-solving.     He asks permission to respond to texts from family and friends, now.  Pt said he saw a psychiatrist as child; was on Ritalin and an antidepressant. In  or  he was diagnosed with bipolar disorder in [place].    Chief Complaint   Patient presents with   • Suicidal Ideation     Patient presents to ED via EMS c/o suicidal ideations w/o any specific plan. Patient states \"I was going to be spontaneous with how i did it.\" Patient reports long hx of having suicidal thoughts but has never been seen by healthcare providers for these thoughts/issues. Calm and cooperative at time of triage.       CURRENT LIVING SITUATION/SOCIAL SUPPORT: Girlfriend broke up with him and kicked him out a few days ago.  \"I just feel like I'm not treated correctly there.\"  So he had begun talking with somebody else. His now-ex sped up the process of " "their breakup.     Income? 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      Stayed recently with grandmother after being kicked out by girlfriend.  He has other family locally.    BEHAVIORAL HEALTH TREATMENT HISTORY  Does patient/parent report a history of prior behavioral health treatment for patient?   Yes:    Dates Level of Care Facilty/Provider Diagnosis/Problem Medications   2012: 2-3 months. outpt [place],  Court-ordered therapy. \"Me and my baby mama had fought.\"  None.   Childhood outpt Héctor Lang, PhD  ADHD?? Ritalin Zoloft    2012 outpt [place] Bipolar?                           SAFETY ASSESSMENT - SELF  Does patient acknowledge current or past symptoms of dangerousness to self? yes  Does parent/significant other report patient has current or past symptoms of dangerousness to self? N\A  Does presenting problem suggest symptoms of dangerousness to self? Yes:     Past Current    Suicidal Thoughts: [x]  [x]    Suicidal Plans: []  []    Suicidal Intent: []  [x]    Suicide Attempts: []  []    Self-Injury []  []      For any boxes checked above, provide detail:   His suicidal intent today? \"Yes and no.\"  Past intent? 1-2 years ago: \"I felt l hanging myself ... but I never followed through.\"   He has contemplated how to kill himself in least painful ways.    History of suicide by family member: no  History of suicide by friend/significant other: no.  Recent change in frequency/specificity/intensity of suicidal thoughts or self-harm behavior? yes - \"Just lately, more.\"  Current access to firearms, medications, or other identified means of suicide/self-harm? yes - has access to firearms, meds, knives. \"I've thought about crashing my jeep off a jayme.\"   His dad \"took possession\" of his firearm.  Father took gun because of pt's depression. Father will return it once he sees a psychiatrist. Weapon? SW SD9VE 9 mm.  He has some cardiac med, but he does not know whether it is lethal. \"I kind of stopped " "taking it.\" He has a small remnant.   If yes, willing to restrict access to means of suicide/self-harm? Not applicable.     \"To be honest with me, am I going to lose my gun rights?\"    Protective factors present:  Fear of suicide and of \"The aftermath. Like death. What's going to happen....\"    SAFETY ASSESSMENT - OTHERS  Does patient acknowledge current or past symptoms of aggressive behavior or risk to others? Yes. \"Not unless it was self-defense.\"   Does parent/significant other report patient has current or past symptoms of aggressive behavior or risk to others?  N\A  Does presenting problem suggest symptoms of dangerousness to others? No    Crisis Safety Plan completed and copy given to patient? In process.    ABUSE/NEGLECT SCREENING  Does patient report feeling “unsafe” in his/her home, or afraid of anyone?  No.  Does patient report any history of physical, sexual, or emotional abuse?  No, but he was bullied in school.  Does parent or significant other report any of the above? N\A  Is there evidence of neglect by self?  Yes: failure to think ahead to access resources.  Is there evidence of neglect by a caregiver? no  Does the patient/parent report any history of CPS/APS/police involvement related to suspected abuse/neglect or domestic violence? Yes. See above.  Based on the information provided during the current assessment, is a mandated report of suspected abuse/neglect being made?  No    Legal Hx: Arrested x 2: 4 weeks incarcerated after throwing baby bottles, domestic violence in presence of a child, and interfering with a 911 call. \"All that was dropped.\"    Here in [place]: driving on a suspended  License.    SUBSTANCE USE SCREENING  Yes:  Sukumar all substances used in the past 30 days: Alcohol.  Past year 5 or more drinks at a time? X3.  Pt denied daily drinking.  He denied daily use of other substances except tobacco. He smokes.      Last Use Amount   [x]   Alcohol October 23 \"I was pretty.... " "smashed.\"   []   Marijuana     []   Heroin     []   Prescription Opioids  (used without prescription, for    recreation, or in excess of prescribed amount)     []   Other Prescription  (used without prescription, for    recreation, or in excess of prescribed amount)     []   Cocaine      []   Methamphetamine     []   \"\" drugs (ectasy, MDMA)     []   Other substances        UDS results: negative.  Breathalyzer results: 0.00    What consequences does the patient associate with any of the above substance use and or addictive behaviors? None  Family alcoholism? None that he knows.    Risk factors for detox (check all that apply):  []  Seizures   []  Diaphoretic (sweating)   []  Tremors   []  Hallucinations   []  Increased blood pressure   []  Decreased blood pressure   []  Other   [x]  None      [] Patient education on risk factors for detoxification and instructed to return to ER as needed.      MENTAL STATUS   Participation: Active verbal participation, Attentive, Engaged and cautious impression management.  Grooming: Adequate and Casual  Orientation: Alert, Fully Oriented, Disoriented to: date (\"14th\") and here \"To find some help.\"  Behavior: Calm and Tense  Eye contact: Limited  Mood: Anxious  Affect: Anxious  Thought process: Logical  Thought content: Rumination  Speech: Rate within normal limits and Volume within normal limits  Perception: Within normal limits  Memory:  No gross evidence of memory deficits  Insight: Poor  Judgment:  Limited  Other:    Collateral information: limited  Source:  [] Significant other present in person:   [] Significant other by telephone  [] [facility]   [x] [facility] Nursing Staff  [x] [facility]  [] Other:     [] Unable to complete full assessment due to:  [] Acute intoxication  [] Patient declined to participate/engage  [] Patient verbally unresponsive  [] Significant cognitive deficits  [] Significant perceptual distortions or behavioral " disorganization  [] Other:      CLINICAL IMPRESSIONS:  Primary: Mood Disorder, unspecified.  Transient Suicidal ideation, Perhaps instrumental, lacking intent or plan.   Secondary:  Rule out: Personality Disorder, unspecified.       IDENTIFIED NEEDS/PLAN:  [Trigger DISPOSITION list for any items marked]    []  Imminent safety risk - self [] Imminent safety risk - others   []  Acute substance withdrawal []  Psychosis/Impaired reality testing   [x]  Mood/anxiety []  Substance use/Addictive behavior   [x]  Maladaptive behaviro []  Parent/child conflict   [x]  Family/Couples conflict []  Biomedical   [x]  Housing [x]  Financial   []   Legal  Occupational/Educational   []  Domestic violence []  Other:     Recommended Plan of Care:  Refer to resources as available through his insurance.    Does patient express agreement with the above plan? yes    Referral appointment(s) scheduled? no    Alert team only:   I have discussed findings and recommendations with Dr. Gaston. We are not in complete agreement with these recommendations. Ct remains on a legal hold.     Referral information sent to the following community providers : [facility].    If applicable : Referred  to : [facility] for legal hold follow up at (time): [facility].    Oswaldo Carter, Ph.D.  11/15/2020

## 2020-11-15 NOTE — ED PROVIDER NOTES
"ED Provider Note    CHIEF COMPLAINT  Chief Complaint   Patient presents with   • Suicidal Ideation     Patient presents to ED via EMS c/o suicidal ideations w/o any specific plan. Patient states \"I was going to be spontaneous with how i did it.\" Patient reports long hx of having suicidal thoughts but has never been seen by healthcare providers for these thoughts/issues. Calm and cooperative at time of triage.        HPI  Олег Ramirez is a 35 y.o. male with a history of GERD and tachycardia of unknown etiology who presents complaining of SI.    Patient states he has been feeling anxious for some time.  In recent days he has had increased SI.  No specific plan.    Patient has had a cardiology work-up including Holter monitoring, echo, and placed on propranolol.  He is unsure when his next appointment is for.    Patient also reports history of severe GERD.  He takes Nexium when he goes to bed.  He works the night shift so sometimes this is in the morning and sometimes in the evening.  He has GERD at this time.  Last dose was yesterday morning.    ALLERGIES  No Known Allergies    CURRENT MEDICATIONS  Nexium  Propranolol    PAST MEDICAL HISTORY   has a past medical history of GERD (gastroesophageal reflux disease).   Tachycardia of unknown etiology    SURGICAL HISTORY   has a past surgical history that includes appendectomy laparoscopic (9/14/2018) and appendectomy.    SOCIAL HISTORY  Social History     Tobacco Use   • Smoking status: Current Some Day Smoker   • Smokeless tobacco: Never Used   • Tobacco comment: 5 cigs a day   Substance and Sexual Activity   • Alcohol use: Yes     Comment: occasionally   • Drug use: No   • Sexual activity: Not Currently     Employed at [employer]    REVIEW OF SYSTEMS  See HPI for further details.  All other systems are negative except as above in HPI.    PHYSICAL EXAM  VITAL SIGNS: /92   Pulse (!) 122   Temp 36.9 °C (98.4 °F)   Resp 18   Ht 1.727 m (5' 8\")   Wt 79.4 kg (175 " lb)   SpO2 99%   BMI 26.61 kg/m²   General:  WDWN, nontoxic appearing, appears anxious; A+Ox3; V/S as above; tachycardic  Skin: warm and dry; good color; no rash  HEENT: NCAT; EOMs intact; PERRL; no scleral icterus   Neck: FROM; soft  Cardiovascular: Regular heart rate and regular rhythm.  No murmurs, rubs, or gallops; pulses 2+ bilaterally radially and DP areas  Lungs: Clear to auscultation with good air movement bilaterally.  No wheezes, rhonchi, or rales.   Abdomen: BS present; soft; NTND; no rebound, guarding, or rigidity.  No organomegaly or pulsatile mass  Extremities: ELAINE x 4; no e/o trauma; no pedal edema; neg Yuriy's  Neurologic: CNs III-XII grossly intact; speech clear; distal sensation intact; strength 5/5 UE/LEs  Psychiatric: Appropriate affect, anxious mood    LABS  Results for orders placed or performed during the hospital encounter of 11/15/20   URINE DRUG SCREEN   Result Value Ref Range    Amphetamines Urine Negative Negative    Barbiturates Negative Negative    Benzodiazepines Negative Negative    Cocaine Metabolite Negative Negative    Methadone Negative Negative    Opiates Negative Negative    Oxycodone Negative Negative    Phencyclidine -Pcp Negative Negative    Propoxyphene Negative Negative    Cannabinoid Metab Negative Negative   POC BREATHALIZER   Result Value Ref Range    POC Breathalizer 0.00 0.00 - 0.01 Percent   POC BREATHALIZER   Result Value Ref Range    POC Breathalizer 0.00 0.00 - 0.01 Percent         MEDICAL RECORD  I have reviewed patient's medical record and pertinent results are listed below.      COURSE & MEDICAL DECISION MAKING  I have reviewed any medical record information, laboratory studies and radiographic results as noted.    Олег Ramirez is a 35 y.o. male with a history of GERD and tachycardia of unknown etiology who presents complaining of anxiety and SI.    Appropriate PPE was worn at all times while interacting with the patient, including goggles, N95 mask,  and surgical mask.    Breathalyzer is 0 and UDS demonstrates no substances.    Life skills consulted.    I feel the patient should be admitted for further psychiatric care.  We have not seen this patient in the past.  He is expressing suicidality and, although vague, he reached for a knife and his family member was worried enough to take his gun away.  Patient advised.      6:37 PM  Legal 2000 paperwork completed.  Patient's care will be overturned to the oncoming ERP, Dr. Montgomery.      FINAL IMPRESSION  1. Suicidal ideation       Electronically signed by: Vanna Gaston M.D., 11/15/2020 2:45 PM

## 2020-11-16 NOTE — DISCHARGE PLANNING
[facility]  Crisis/Safety Plan    Name:  Олег Ramirez  MRN:  [id number]  Date:  11/15/2020    Warning signs that a crisis may be developing for me or I may be at risk:  1) sadness.  2) suicidal thoughts (e.g., If I start thinking of things that would harm myself purposely).  3) Depressing thoughts (e.g., grandfather passing away).    Coping strategies I can use on my own (relaxation, physical activity, etc):  1) relaxation (e.g., laying down on the couch watching TV; sitting on a deck chair outside)  2) listening to music (Metal: [employer])  3) Talking to someone (E.g., mother and father and girlfriend)    Ways I can make my environment safe:  1) No weapons of any kind (or put away).  2) Keeping a clean environment.  3)    Things I want to tell myself when I feel a crisis developin) Keep calm, take deep breaths.  2) Counting up: I just figured counting would keep me under control.  3) It's not that bad. There's people with worse situations that they've overcome.  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    People I can contact for support or distraction (and their phone numbers):  1) girlfriendJeannette [phone]  2) MomMary [phone]  3) Dad [phone]    If I’m not able to reach my support people, or the above strategies don’t help, I can contact the following professionals, agencies, or hotlines:  1) Crisis Call Center ():  1-189-477-3597 -OR- ([phone]  2) Crisis Text Line ():  Text CARE TO 816340  3) [facility]  4) Héctor Villatoro, PhD? Or?    Oswaldo Carter, Ph.D.

## 2020-11-16 NOTE — ED NOTES
Patient calm and cooperative. Patient belongings removed from room and security called to do inventory.

## 2020-11-16 NOTE — ED NOTES
Report given to [facility]. Pt belongings removed from locker and given to [facility] to be sent with ptTerence Mcrae on transfer to [facility].

## 2020-11-16 NOTE — DISCHARGE PLANNING
Medical Social Work    Referral: Legal hold Transfer to Mental Health Facility    Intervention: JOMAR received call from Anna at [facility] stating that Dr. Pelaez has accepted the patient for admission.  Anna requested transport time be arranged for 2300.    JOMAR arranged for transportation to be set up through [facility] with [facility].    The pt will be picked up at 2300.     JOMAR notified the RN of the departure time as well as accepting facility.     JOMAR created transfer packet and placed on chart. Original Legal Hold placed in packet.     Plan: Pt will transfer to [facility] at 2300.

## 2020-11-16 NOTE — ED NOTES
"ALERT Team ANAIS Cruz in discussing plan of care with pt. Pt verbalizing that he wants to leave, \"I'm not going to kill myself\", \"I need to go to work tomorrow\", \"I only came here to get help\". Process of legal hold/care plan being discussed with pt.  "

## 2020-11-16 NOTE — DISCHARGE PLANNING
Medical Social Work    MSW received a call from Amalia with [facility] that they are running behind for  and estimate new ETA of 2330.  MSW updated bedside RN and Gustavo at [facility] of delay.

## 2020-11-16 NOTE — DISCHARGE PLANNING
Medical Social Work    Referral: Legal Hold    Intervention: Legal Hold Paperwork given to SW by Life Skills RN: Nancy    Legal Hold Initiated: Date: 11/15/2020  Time: 1830    Legal Hold faxed: Date: 11/15/2020  Time: 2037    Patient’s Insurance Listed on Face Sheet: [facility]    Referrals sent to: [facility] and [facility]    Plan: Patient will transfer to mental health facility once acceptance is obtained.

## 2020-12-07 ENCOUNTER — TELEPHONE (OUTPATIENT)
Dept: MEDICAL GROUP | Facility: CLINIC | Age: 35
End: 2020-12-07

## 2020-12-07 NOTE — TELEPHONE ENCOUNTER
Phone Number Called: [phone] (home)       Call outcome: VM full    Message: Tried to LM but Pt mailbox was full.

## 2021-04-22 ENCOUNTER — OFFICE VISIT (OUTPATIENT)
Dept: MEDICAL GROUP | Facility: PHYSICIAN GROUP | Age: 36
End: 2021-04-22
Payer: COMMERCIAL

## 2021-04-22 VITALS
OXYGEN SATURATION: 94 % | DIASTOLIC BLOOD PRESSURE: 75 MMHG | HEIGHT: 68 IN | WEIGHT: 184.8 LBS | TEMPERATURE: 97 F | RESPIRATION RATE: 12 BRPM | SYSTOLIC BLOOD PRESSURE: 125 MMHG | HEART RATE: 128 BPM | BODY MASS INDEX: 28.01 KG/M2

## 2021-04-22 DIAGNOSIS — G43.009 MIGRAINE WITHOUT AURA AND WITHOUT STATUS MIGRAINOSUS, NOT INTRACTABLE: ICD-10-CM

## 2021-04-22 PROCEDURE — 99213 OFFICE O/P EST LOW 20 MIN: CPT | Performed by: NURSE PRACTITIONER

## 2021-04-22 ASSESSMENT — FIBROSIS 4 INDEX: FIB4 SCORE: 0.42

## 2021-04-22 ASSESSMENT — PATIENT HEALTH QUESTIONNAIRE - PHQ9: CLINICAL INTERPRETATION OF PHQ2 SCORE: 0

## 2021-04-22 NOTE — LETTER
April 22, 2021        Олег Ramirez  [address] [address]        To whom it may concern:    Patient was seen in the clinic today for medical problem.  Patient is released to full duty with no restrictions.    If you have any questions or concerns, please don't hesitate to call.        Sincerely,        LYNDSEY Vences.    Electronically Signed

## 2021-04-23 NOTE — ASSESSMENT & PLAN NOTE
New problem.  Patient reports Hx of migraines for years.  Frequency of migraines is once or twice per week.  Migraines come on generally in the afternoon.  Patient denies any vision issues, was tested 1 year ago for work with normal checkup per patient.  Takes Excedrin over-the-counter, helpful.  Patient does not consume coffee, denies street drugs or alcohol.  FH of migraines in mother and grandmother.  Patient was told by his mother that he is not drinking enough water.  He mainly drinks juices.  He reports average diet.  Denies any issues sleeping.  Patient admits to poor diet.  He denies URI symptoms.  He was at one point prescribed propranolol which he tried for bed did not think was helpful and stopped.  He is struggling with acid reflux for which he is taking Nexium.

## 2021-04-23 NOTE — PROGRESS NOTES
October 1, 2018        Michael Peterson  [address] [facility] [address]  Phone: [phone]  Fax: [phone]             [facility]  [address]  [place] [address]  Phone: [phone]   Patient: Jhonny Diana   MR Number: [id number]   YOB: 1987   Date of Visit: 9/27/2018       Dear Dr. Michael Peterson    Thank you for referring Jhonny Diana to me for evaluation. Attached you will find relevant portions of my assessment and plan of care.    If you have questions, please do not hesitate to call me. I look forward to following Jhonny Diana along with you.    Sincerely,    Abdifatah Witt MD    Enclosure    If you would like to receive this communication electronically, please contact [email or web address] or [phone] to request [employer] Link access.    [employer] Link is a tool which provides read-only access to select patient information with whom you have a relationship. Its easy to use and provides real time access to review your patients record including encounter summaries, notes, results, and demographic information.    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 male, established patient who presents today to discuss medical problems as listed below:      Migraine without aura  New problem.  Patient reports Hx of migraines for years.  Frequency of migraines is once or twice per week.  Migraines come on generally in the afternoon.  Patient denies any vision issues, was tested 1 year ago for work with normal checkup per patient.  Takes Excedrin over-the-counter, helpful.  Patient does not consume coffee, denies street drugs or alcohol.  FH of migraines in mother and grandmother.  Patient was told by his mother that he is not drinking enough water.  He mainly drinks juices.  He reports average diet.  Denies any issues sleeping.  Patient admits to poor diet.  He denies URI symptoms.  He was at one point prescribed propranolol which he tried for bed did not think was helpful and stopped.  He is struggling with acid reflux for which he is taking Nexium.      Patient Active Problem List    Diagnosis Date Noted   • Migraine without aura 04/22/2021   • Syncope and collapse 07/29/2020   • Palpitations 06/03/2020   • Gastroesophageal reflux disease without esophagitis 06/03/2020   • Tachycardia 06/03/2020   • Prolonged QT syndrome 06/03/2020        Allergies: Patient has no known allergies.    Current Outpatient Medications   Medication Sig Dispense Refill   • asa/apap/caffeine (EXCEDRIN) 250-250-65 MG Tab Take 1 tablet by mouth every 6 hours as needed for Headache.     • esomeprazole (NEXIUM) 40 MG delayed-release capsule Take 1 Cap by mouth every morning before breakfast. 30 Cap 3   • propranolol (INDERAL) 40 MG Tab Take 1 Tab by mouth 2 times a day. (Patient not taking: Reported on 4/22/2021) 60 Tab 2     No current facility-administered medications for this visit.  "      Social History     Tobacco Use   • Smoking status: Former Smoker     Types: Cigarettes   • Smokeless tobacco: Never Used   • Tobacco comment: 5 cigs a day   Substance Use Topics   • Alcohol use: Yes     Comment: occasionally   • Drug use: No     Social History     Social History Narrative   • Not on file       Family History   Problem Relation Age of Onset   • Cancer Paternal Grandfather         esophageal       Allergies, past medical history, past surgical history, family history, social history reviewed and updated.    Review of Systems:     - Constitutional: Negative for fever, chills, unexpected weight change, and fatigue/generalized weakness.     - HEENT: Migraines.  Negative for vision changes, hearing changes, ear pain, ear discharge, rhinorrhea, sinus congestion, sore throat, and neck pain.      - Respiratory: Negative for cough, sputum production, chest congestion, dyspnea, wheezing, and crackles.      - Cardiovascular: Negative for chest pain, palpitations, orthopnea, and bilateral lower extremity edema.     - Psychiatric/Behavioral: Negative for depression, suicidal/homicidal ideation and memory loss.      All other systems reviewed and are negative    Exam:    /75   Pulse (!) 128   Temp 36.1 °C (97 °F) (Temporal)   Resp 12   Ht 1.727 m (5' 8\")   Wt 83.8 kg (184 lb 12.8 oz)   SpO2 94%   BMI 28.10 kg/m²  Body mass index is 28.1 kg/m².    Physical Exam:  Constitutional: Well-developed and well-nourished. Not diaphoretic. No distress.   Cardiovascular: Regular rate and rhythm, S1 and S2 without murmur, rubs, or gallops.    Chest: Effort normal. Clear to auscultation throughout. No adventitious sounds. No   Neurological: Alert and oriented x 3.   Psychiatric:  Behavior, mood, and affect are appropriate.  MA/nursing note and vitals reviewed.    LABS:7/2020  results reviewed and discussed with the patient, questions answered.    My total time spent caring for the patient on the day of the " encounter was 15 minutes.   This does not include time spent on separately billable procedures/tests.     Assessment/Plan:  1. Migraine without aura and without status migrainosus, not intractable  Uncontrolled, stable.  DDx includes but not limited to chronic intermittent dehydration as patient consumes very little water and mainly consumes juices for hydration, in addition to poor diet.  Advise patient to implement healthy diet, avoid excessive fast foods and juices as main hydration source.  Advised to drink water, avoid excessive sugar intake.  Also advised to start with magnesium 400 mg nightly.  Take OTC Excedrin as soon as the headache comes on.  Recommend follow-up with optometry for vision check.  If this is not effective in 3 to 4 weeks, follow-up with PCP.       Discussed with patient possible alternative diagnoses, pt is to take all medications as prescribed. If symptoms persist FU w/PCP, if symptoms worsen go to emergency room. If experiencing any side effects from prescribed medications reports to the office immediately or go to emergency room.  Reviewed indication, dosage, usage and potential adverse effects of prescribed medications. Reviewed risks and benefits of treatment plan. Patient verbalizes understanding of all instruction and verbally agrees to plan.    No follow-ups on file.

## 2021-05-09 ENCOUNTER — OFFICE VISIT (OUTPATIENT)
Dept: URGENT CARE | Facility: CLINIC | Age: 36
End: 2021-05-09
Payer: COMMERCIAL

## 2021-05-09 VITALS
WEIGHT: 188 LBS | RESPIRATION RATE: 16 BRPM | TEMPERATURE: 97.7 F | DIASTOLIC BLOOD PRESSURE: 88 MMHG | SYSTOLIC BLOOD PRESSURE: 112 MMHG | HEART RATE: 131 BPM | OXYGEN SATURATION: 96 % | HEIGHT: 68 IN | BODY MASS INDEX: 28.49 KG/M2

## 2021-05-09 DIAGNOSIS — K21.00 GASTROESOPHAGEAL REFLUX DISEASE WITH ESOPHAGITIS WITHOUT HEMORRHAGE: ICD-10-CM

## 2021-05-09 PROCEDURE — 99214 OFFICE O/P EST MOD 30 MIN: CPT | Performed by: PHYSICIAN ASSISTANT

## 2021-05-09 RX ORDER — OMEPRAZOLE 20 MG/1
20 CAPSULE, DELAYED RELEASE ORAL 2 TIMES DAILY
Qty: 60 CAPSULE | Refills: 3 | Status: SHIPPED | OUTPATIENT
Start: 2021-05-09 | End: 2021-09-05

## 2021-05-09 RX ORDER — SUCRALFATE 1 G/1
1 TABLET ORAL
Qty: 120 TABLET | Refills: 3 | Status: SHIPPED | OUTPATIENT
Start: 2021-05-09 | End: 2021-09-05

## 2021-05-09 ASSESSMENT — ENCOUNTER SYMPTOMS
MUSCULOSKELETAL NEGATIVE: 1
ABDOMINAL PAIN: 1
EYES NEGATIVE: 1
NEUROLOGICAL NEGATIVE: 1
COUGH: 1
SORE THROAT: 1
FEVER: 0
BLOOD IN STOOL: 0
SHORTNESS OF BREATH: 0
NAUSEA: 0
VOMITING: 1
HEARTBURN: 1
GLOBUS SENSATION: 1
WHEEZING: 0
CHILLS: 0
DIARRHEA: 0

## 2021-05-09 ASSESSMENT — FIBROSIS 4 INDEX: FIB4 SCORE: 0.42

## 2021-05-09 NOTE — LETTER
May 9, 2021         Patient: Олег Ramirez   YOB: 1985   Date of Visit: 5/9/2021           To Whom it May Concern:    Олег Ramirez was seen in my clinic on 5/9/2021. He may return to work on Monday May 10th.    If you have any questions or concerns, please don't hesitate to call.        Sincerely,           Ok Woodward P.A.-C.  Electronically Signed

## 2021-05-10 NOTE — PATIENT INSTRUCTIONS
Gastroesophageal Reflux Disease, Adult  Gastroesophageal reflux (SOCO) happens when acid from the stomach flows up into the tube that connects the mouth and the stomach (esophagus). Normally, food travels down the esophagus and stays in the stomach to be digested. However, when a person has SOCO, food and stomach acid sometimes move back up into the esophagus. If this becomes a more serious problem, the person may be diagnosed with a disease called gastroesophageal reflux disease (GERD). GERD occurs when the reflux:  · Happens often.  · Causes frequent or severe symptoms.  · Causes problems such as damage to the esophagus.  When stomach acid comes in contact with the esophagus, the acid may cause soreness (inflammation) in the esophagus. Over time, GERD may create small holes (ulcers) in the lining of the esophagus.  What are the causes?  This condition is caused by a problem with the muscle between the esophagus and the stomach (lower esophageal sphincter, or LES). Normally, the LES muscle closes after food passes through the esophagus to the stomach. When the LES is weakened or abnormal, it does not close properly, and that allows food and stomach acid to go back up into the esophagus.  The LES can be weakened by certain dietary substances, medicines, and medical conditions, including:  · Tobacco use.  · Pregnancy.  · Having a hiatal hernia.  · Alcohol use.  · Certain foods and beverages, such as coffee, chocolate, onions, and peppermint.  What increases the risk?  You are more likely to develop this condition if you:  · Have an increased body weight.  · Have a connective tissue disorder.  · Use NSAID medicines.  What are the signs or symptoms?  Symptoms of this condition include:  · Heartburn.  · Difficult or painful swallowing.  · The feeling of having a lump in the throat.  · A bitter taste in the mouth.  · Bad breath.  · Having a large amount of saliva.  · Having an upset or bloated  stomach.  · Belching.  · Chest pain. Different conditions can cause chest pain. Make sure you see your health care provider if you experience chest pain.  · Shortness of breath or wheezing.  · Ongoing (chronic) cough or a night-time cough.  · Wearing away of tooth enamel.  · Weight loss.  How is this diagnosed?  Your health care provider will take a medical history and perform a physical exam. To determine if you have mild or severe GERD, your health care provider may also monitor how you respond to treatment. You may also have tests, including:  · A test to examine your stomach and esophagus with a small camera (endoscopy).  · A test that measures the acidity level in your esophagus.  · A test that measures how much pressure is on your esophagus.  · A barium swallow or modified barium swallow test to show the shape, size, and functioning of your esophagus.  How is this treated?  The goal of treatment is to help relieve your symptoms and to prevent complications. Treatment for this condition may vary depending on how severe your symptoms are. Your health care provider may recommend:  · Changes to your diet.  · Medicine.  · Surgery.  Follow these instructions at home:  Eating and drinking    · Follow a diet as recommended by your health care provider. This may involve avoiding foods and drinks such as:  ? Coffee and tea (with or without caffeine).  ? Drinks that contain alcohol.  ? Energy drinks and sports drinks.  ? Carbonated drinks or sodas.  ? Chocolate and cocoa.  ? Peppermint and mint flavorings.  ? Garlic and onions.  ? Horseradish.  ? Spicy and acidic foods, including peppers, chili powder, mg powder, vinegar, hot sauces, and barbecue sauce.  ? Citrus fruit juices and citrus fruits, such as oranges, rut, and limes.  ? Tomato-based foods, such as red sauce, chili, salsa, and pizza with red sauce.  ? Fried and fatty foods, such as donuts, french fries, potato chips, and high-fat dressings.  ? High-fat  meats, such as hot dogs and fatty cuts of red and white meats, such as rib eye steak, sausage, ham, and hunter.  ? High-fat dairy items, such as whole milk, butter, and cream cheese.  · Eat small, frequent meals instead of large meals.  · Avoid drinking large amounts of liquid with your meals.  · Avoid eating meals during the 2-3 hours before bedtime.  · Avoid lying down right after you eat.  · Do not exercise right after you eat.  Lifestyle    · Do not use any products that contain nicotine or tobacco, such as cigarettes, e-cigarettes, and chewing tobacco. If you need help quitting, ask your health care provider.  · Try to reduce your stress by using methods such as yoga or meditation. If you need help reducing stress, ask your health care provider.  · If you are overweight, reduce your weight to an amount that is healthy for you. Ask your health care provider for guidance about a safe weight loss goal.  General instructions  · Pay attention to any changes in your symptoms.  · Take over-the-counter and prescription medicines only as told by your health care provider. Do not take aspirin, ibuprofen, or other NSAIDs unless your health care provider told you to do so.  · Wear loose-fitting clothing. Do not wear anything tight around your waist that causes pressure on your abdomen.  · Raise (elevate) the head of your bed about 6 inches (15 cm).  · Avoid bending over if this makes your symptoms worse.  · Keep all follow-up visits as told by your health care provider. This is important.  Contact a health care provider if:  · You have:  ? New symptoms.  ? Unexplained weight loss.  ? Difficulty swallowing or it hurts to swallow.  ? Wheezing or a persistent cough.  ? A hoarse voice.  · Your symptoms do not improve with treatment.  Get help right away if you:  · Have pain in your arms, neck, jaw, teeth, or back.  · Feel sweaty, dizzy, or light-headed.  · Have chest pain or shortness of breath.  · Vomit and your vomit looks  like blood or coffee grounds.  · Faint.  · Have stool that is bloody or black.  · Cannot swallow, drink, or eat.  Summary  · Gastroesophageal reflux happens when acid from the stomach flows up into the esophagus. GERD is a disease in which the reflux happens often, causes frequent or severe symptoms, or causes problems such as damage to the esophagus.  · Treatment for this condition may vary depending on how severe your symptoms are. Your health care provider may recommend diet and lifestyle changes, medicine, or surgery.  · Contact a health care provider if you have new or worsening symptoms.  · Take over-the-counter and prescription medicines only as told by your health care provider. Do not take aspirin, ibuprofen, or other NSAIDs unless your health care provider told you to do so.  · Keep all follow-up visits as told by your health care provider. This is important.  This information is not intended to replace advice given to you by your health care provider. Make sure you discuss any questions you have with your health care provider.  Document Released: 09/27/2006 Document Revised: 06/26/2019 Document Reviewed: 06/26/2019  [employer] Patient Education © 2020 [employer]  Food Choices for Gastroesophageal Reflux Disease, Adult  When you have gastroesophageal reflux disease (GERD), the foods you eat and your eating habits are very important. Choosing the right foods can help ease your discomfort. Think about working with a nutrition specialist (dietitian) to help you make good choices.  What are tips for following this plan?    Meals  · Choose healthy foods that are low in fat, such as fruits, vegetables, whole grains, low-fat dairy products, and lean meat, fish, and poultry.  · Eat small meals often instead of 3 large meals a day. Eat your meals slowly, and in a place where you are relaxed. Avoid bending over or lying down until 2-3 hours after eating.  · Avoid eating meals 2-3 hours before bed.  · Avoid drinking  a lot of liquid with meals.  · Cook foods using methods other than frying. Bake, grill, or broil food instead.  · Avoid or limit:  ? Chocolate.  ? Peppermint or spearmint.  ? Alcohol.  ? Pepper.  ? Black and decaffeinated coffee.  ? Black and decaffeinated tea.  ? Bubbly (carbonated) soft drinks.  ? Caffeinated energy drinks and soft drinks.  · Limit high-fat foods such as:  ? Fatty meat or fried foods.  ? Whole milk, cream, butter, or ice cream.  ? Nuts and nut butters.  ? Pastries, donuts, and sweets made with butter or shortening.  · Avoid foods that cause symptoms. These foods may be different for everyone. Common foods that cause symptoms include:  ? Tomatoes.  ? Oranges, rut, and limes.  ? Peppers.  ? Spicy food.  ? Onions and garlic.  ? Vinegar.  Lifestyle  · Maintain a healthy weight. Ask your doctor what weight is healthy for you. If you need to lose weight, work with your doctor to do so safely.  · Exercise for at least 30 minutes for 5 or more days each week, or as told by your doctor.  · Wear loose-fitting clothes.  · Do not smoke. If you need help quitting, ask your doctor.  · Sleep with the head of your bed higher than your feet. Use a wedge under the mattress or blocks under the bed frame to raise the head of the bed.  Summary  · When you have gastroesophageal reflux disease (GERD), food and lifestyle choices are very important in easing your symptoms.  · Eat small meals often instead of 3 large meals a day. Eat your meals slowly, and in a place where you are relaxed.  · Limit high-fat foods such as fatty meat or fried foods.  · Avoid bending over or lying down until 2-3 hours after eating.  · Avoid peppermint and spearmint, caffeine, alcohol, and chocolate.  This information is not intended to replace advice given to you by your health care provider. Make sure you discuss any questions you have with your health care provider.  Document Released: 06/18/2013 Document Revised: 04/09/2020 Document  Reviewed: 01/23/2018  Elsevier Patient Education © 2020 [employer]

## 2021-05-10 NOTE — PROGRESS NOTES
Subjective:      Олег Ramirez is a 35 y.o. male who presents with Gastrophageal Reflux (with cough, lack of sleep due to cough, )            Has not seen gastroenterology for over 3-4 years when he lived in [place].  Social drinker and smoker.  Weekly Excedrin use secondary to migraines.    Gastrophageal Reflux  He complains of abdominal pain, coughing, globus sensation, heartburn and a sore throat. He reports no chest pain, no nausea or no wheezing. This is a chronic problem. The current episode started more than 1 year ago. The problem occurs constantly. The problem has been gradually worsening. The heartburn duration is an hour. The heartburn is located in the abdomen. The heartburn is of moderate intensity. The heartburn wakes him from sleep. The heartburn limits his activity. The heartburn changes with position. The symptoms are aggravated by lying down and certain foods. Risk factors include smoking/tobacco exposure. He has tried a PPI and an antacid for the symptoms. The treatment provided mild relief.       PMH:  has a past medical history of GERD (gastroesophageal reflux disease).  MEDS:   Current Outpatient Medications:   •  esomeprazole (NEXIUM) 40 MG delayed-release capsule, Take 1 Cap by mouth every morning before breakfast., Disp: 30 Cap, Rfl: 3  •  asa/apap/caffeine (EXCEDRIN) 250-250-65 MG Tab, Take 1 tablet by mouth every 6 hours as needed for Headache., Disp: , Rfl:   •  propranolol (INDERAL) 40 MG Tab, Take 1 Tab by mouth 2 times a day. (Patient not taking: Reported on 4/22/2021), Disp: 60 Tab, Rfl: 2  ALLERGIES: No Known Allergies  SURGHX:   Past Surgical History:   Procedure Laterality Date   • APPENDECTOMY LAPAROSCOPIC  9/14/2018    Procedure: APPENDECTOMY LAPAROSCOPIC;  Surgeon: Baljit Mondragon M.D.;  Location: SURGERY [facility];  Service: General   • APPENDECTOMY       SOCHX:  reports that he has quit smoking. His smoking use included cigarettes. He has never used smokeless  "tobacco. He reports current alcohol use. He reports that he does not use drugs.  FH: family history includes Cancer in his paternal grandfather.    Review of Systems   Constitutional: Negative for chills and fever.   HENT: Positive for sore throat.    Eyes: Negative.    Respiratory: Positive for cough. Negative for shortness of breath and wheezing.    Cardiovascular: Negative for chest pain and leg swelling.   Gastrointestinal: Positive for abdominal pain, heartburn and vomiting. Negative for blood in stool, diarrhea and nausea.   Genitourinary: Negative.    Musculoskeletal: Negative.    Neurological: Negative.        Medications, Allergies, and current problem list reviewed today in Epic     Objective:     /88 (BP Location: Left arm, Patient Position: Sitting, BP Cuff Size: Adult)   Pulse (!) 131   Temp 36.5 °C (97.7 °F) (Temporal)   Resp 16   Ht 1.727 m (5' 8\")   Wt 85.3 kg (188 lb)   SpO2 96%   BMI 28.59 kg/m²      Physical Exam  Vitals and nursing note reviewed.   Constitutional:       General: He is not in acute distress.     Appearance: Normal appearance. He is well-developed. He is not diaphoretic.   HENT:      Head: Normocephalic and atraumatic.      Right Ear: External ear normal.      Left Ear: External ear normal.      Nose: Nose normal.      Mouth/Throat:      Mouth: Mucous membranes are moist.      Pharynx: No oropharyngeal exudate or posterior oropharyngeal erythema.   Eyes:      Conjunctiva/sclera: Conjunctivae normal.   Cardiovascular:      Rate and Rhythm: Normal rate and regular rhythm.      Heart sounds: Normal heart sounds. No murmur.   Pulmonary:      Effort: Pulmonary effort is normal. No respiratory distress.      Breath sounds: Normal breath sounds. No wheezing, rhonchi or rales.   Chest:      Chest wall: No tenderness.   Abdominal:      General: Abdomen is flat. Bowel sounds are normal. There is no distension.      Palpations: Abdomen is soft.      Tenderness: There is no " abdominal tenderness. There is no right CVA tenderness, left CVA tenderness, guarding or rebound.   Musculoskeletal:      Cervical back: Normal range of motion and neck supple.      Right lower leg: No edema.      Left lower leg: No edema.   Skin:     General: Skin is warm and dry.   Neurological:      Mental Status: He is alert and oriented to person, place, and time.   Psychiatric:         Behavior: Behavior normal.         Thought Content: Thought content normal.         Judgment: Judgment normal.                 Assessment/Plan:         1. Gastroesophageal reflux disease with esophagitis without hemorrhage  REFERRAL TO GASTROENTEROLOGY    sucralfate (CARAFATE) 1 GM Tab    omeprazole (PRILOSEC) 20 MG delayed-release capsule     Chronic history of worsening GERD with esophagitis.  Worse after eating and when lying down at night.  Nighttime cough and globus sensation noted.  No hematochezia or hematemesis.  No fevers or urinary symptoms.  Vital signs and exam benign.  Has been on Nexium for several years.  Will switch to omeprazole.  Trial sucralfate.  Referral to gastroenterology.  GI cocktail in clinic.  OTC meds and conservative measures as discussed    Return to clinic or go to ED if symptoms worsen or persist. Indications for ED discussed at length. Patient/Parent/Guardian voices understanding. Follow-up with your primary care provider in 3-5 days. Red flag symptoms discussed. All side effects of medication discussed including allergic response, GI upset, tendon injury, rash, sedation etc.    Please note that this dictation was created using voice recognition software. I have made every reasonable attempt to correct obvious errors, but I expect that there are errors of grammar and possibly content that I did not discover before finalizing the note.

## 2021-05-25 ENCOUNTER — HOSPITAL ENCOUNTER (EMERGENCY)
Facility: MEDICAL CENTER | Age: 36
End: 2021-05-25
Attending: EMERGENCY MEDICINE
Payer: COMMERCIAL

## 2021-05-25 ENCOUNTER — APPOINTMENT (OUTPATIENT)
Dept: RADIOLOGY | Facility: MEDICAL CENTER | Age: 36
End: 2021-05-25
Attending: EMERGENCY MEDICINE
Payer: COMMERCIAL

## 2021-05-25 VITALS
TEMPERATURE: 97.7 F | DIASTOLIC BLOOD PRESSURE: 68 MMHG | HEIGHT: 68 IN | SYSTOLIC BLOOD PRESSURE: 117 MMHG | RESPIRATION RATE: 16 BRPM | OXYGEN SATURATION: 100 % | BODY MASS INDEX: 28.49 KG/M2 | WEIGHT: 188 LBS | HEART RATE: 88 BPM

## 2021-05-25 DIAGNOSIS — R00.2 PALPITATIONS: ICD-10-CM

## 2021-05-25 DIAGNOSIS — R10.9 ABDOMINAL PAIN, UNSPECIFIED ABDOMINAL LOCATION: ICD-10-CM

## 2021-05-25 LAB
ALBUMIN SERPL BCP-MCNC: 4.3 G/DL (ref 3.2–4.9)
ALBUMIN/GLOB SERPL: 1.4 G/DL
ALP SERPL-CCNC: 74 U/L (ref 30–99)
ALT SERPL-CCNC: 58 U/L (ref 2–50)
ANION GAP SERPL CALC-SCNC: 15 MMOL/L (ref 7–16)
APPEARANCE UR: CLEAR
AST SERPL-CCNC: 40 U/L (ref 12–45)
BASOPHILS # BLD AUTO: 0.7 % (ref 0–1.8)
BASOPHILS # BLD: 0.06 K/UL (ref 0–0.12)
BILIRUB SERPL-MCNC: 0.5 MG/DL (ref 0.1–1.5)
BILIRUB UR QL STRIP.AUTO: NEGATIVE
BUN SERPL-MCNC: 19 MG/DL (ref 8–22)
CALCIUM SERPL-MCNC: 9.4 MG/DL (ref 8.5–10.5)
CHLORIDE SERPL-SCNC: 105 MMOL/L (ref 96–112)
CO2 SERPL-SCNC: 21 MMOL/L (ref 20–33)
COLOR UR: YELLOW
CREAT SERPL-MCNC: 0.87 MG/DL (ref 0.5–1.4)
D DIMER PPP IA.FEU-MCNC: 0.43 UG/ML (FEU) (ref 0–0.5)
EKG IMPRESSION: NORMAL
EOSINOPHIL # BLD AUTO: 0.44 K/UL (ref 0–0.51)
EOSINOPHIL NFR BLD: 4.9 % (ref 0–6.9)
ERYTHROCYTE [DISTWIDTH] IN BLOOD BY AUTOMATED COUNT: 42.6 FL (ref 35.9–50)
GLOBULIN SER CALC-MCNC: 3.1 G/DL (ref 1.9–3.5)
GLUCOSE SERPL-MCNC: 101 MG/DL (ref 65–99)
GLUCOSE UR STRIP.AUTO-MCNC: NEGATIVE MG/DL
HCT VFR BLD AUTO: 46.4 % (ref 42–52)
HGB BLD-MCNC: 15.5 G/DL (ref 14–18)
IMM GRANULOCYTES # BLD AUTO: 0.03 K/UL (ref 0–0.11)
IMM GRANULOCYTES NFR BLD AUTO: 0.3 % (ref 0–0.9)
KETONES UR STRIP.AUTO-MCNC: ABNORMAL MG/DL
LEUKOCYTE ESTERASE UR QL STRIP.AUTO: NEGATIVE
LIPASE SERPL-CCNC: 24 U/L (ref 11–82)
LYMPHOCYTES # BLD AUTO: 2.54 K/UL (ref 1–4.8)
LYMPHOCYTES NFR BLD: 28.1 % (ref 22–41)
MCH RBC QN AUTO: 27.8 PG (ref 27–33)
MCHC RBC AUTO-ENTMCNC: 33.4 G/DL (ref 33.7–35.3)
MCV RBC AUTO: 83.2 FL (ref 81.4–97.8)
MICRO URNS: ABNORMAL
MONOCYTES # BLD AUTO: 0.95 K/UL (ref 0–0.85)
MONOCYTES NFR BLD AUTO: 10.5 % (ref 0–13.4)
NEUTROPHILS # BLD AUTO: 5.01 K/UL (ref 1.82–7.42)
NEUTROPHILS NFR BLD: 55.5 % (ref 44–72)
NITRITE UR QL STRIP.AUTO: NEGATIVE
NRBC # BLD AUTO: 0 K/UL
NRBC BLD-RTO: 0 /100 WBC
PH UR STRIP.AUTO: 5 [PH] (ref 5–8)
PLATELET # BLD AUTO: 349 K/UL (ref 164–446)
PMV BLD AUTO: 10.4 FL (ref 9–12.9)
POTASSIUM SERPL-SCNC: 4 MMOL/L (ref 3.6–5.5)
PROT SERPL-MCNC: 7.4 G/DL (ref 6–8.2)
PROT UR QL STRIP: NEGATIVE MG/DL
RBC # BLD AUTO: 5.58 M/UL (ref 4.7–6.1)
RBC UR QL AUTO: NEGATIVE
SODIUM SERPL-SCNC: 141 MMOL/L (ref 135–145)
SP GR UR STRIP.AUTO: 1.03
TROPONIN T SERPL-MCNC: 7 NG/L (ref 6–19)
TSH SERPL DL<=0.005 MIU/L-ACNC: 1.85 UIU/ML (ref 0.38–5.33)
UROBILINOGEN UR STRIP.AUTO-MCNC: 1 MG/DL
WBC # BLD AUTO: 9 K/UL (ref 4.8–10.8)

## 2021-05-25 PROCEDURE — 93005 ELECTROCARDIOGRAM TRACING: CPT | Performed by: EMERGENCY MEDICINE

## 2021-05-25 PROCEDURE — 74177 CT ABD & PELVIS W/CONTRAST: CPT

## 2021-05-25 PROCEDURE — 84484 ASSAY OF TROPONIN QUANT: CPT

## 2021-05-25 PROCEDURE — 85025 COMPLETE CBC W/AUTO DIFF WBC: CPT

## 2021-05-25 PROCEDURE — 93005 ELECTROCARDIOGRAM TRACING: CPT

## 2021-05-25 PROCEDURE — 81003 URINALYSIS AUTO W/O SCOPE: CPT

## 2021-05-25 PROCEDURE — 700117 HCHG RX CONTRAST REV CODE 255: Performed by: EMERGENCY MEDICINE

## 2021-05-25 PROCEDURE — 84443 ASSAY THYROID STIM HORMONE: CPT

## 2021-05-25 PROCEDURE — 85379 FIBRIN DEGRADATION QUANT: CPT

## 2021-05-25 PROCEDURE — 71045 X-RAY EXAM CHEST 1 VIEW: CPT

## 2021-05-25 PROCEDURE — 99283 EMERGENCY DEPT VISIT LOW MDM: CPT

## 2021-05-25 PROCEDURE — 80053 COMPREHEN METABOLIC PANEL: CPT

## 2021-05-25 PROCEDURE — 83690 ASSAY OF LIPASE: CPT

## 2021-05-25 RX ADMIN — IOHEXOL 100 ML: 350 INJECTION, SOLUTION INTRAVENOUS at 09:39

## 2021-05-25 ASSESSMENT — FIBROSIS 4 INDEX: FIB4 SCORE: 0.42

## 2021-05-25 ASSESSMENT — PAIN SCALES - WONG BAKER: WONGBAKER_NUMERICALRESPONSE: DOESN'T HURT AT ALL

## 2021-05-25 NOTE — ED TRIAGE NOTES
Chief Complaint   Patient presents with   • Palpitations     Pt drove himself to the ER c/o reoccurrence chronic intermittent palpitations and tachycardia, pt states it started at rest, denies any new symptoms    Pt & staff masked and in appropriate PPE during encounter.  Pt denies fever/travel or being in contact with anyone testing positive for Covid.  Explained pt the triage process, made pt aware to tell the RN/staff of any changes/concerns, pt verbalized understanding of process and instructions given.  Pt to ER lobby.

## 2021-05-25 NOTE — ED PROVIDER NOTES
"ED Provider Note    CHIEF COMPLAINT  Chief Complaint   Patient presents with   • Palpitations       HPI  Олегchirstine Ramirez is a 35 y.o. male who presents with a chief complaint of sharp, stabbing, intermittent LLQ pain that he rates as an 8/10.  When the pain starts it lasts approximately 90 minutes.  The pain initially started Sunday night at 10:30 PM without an obvious stressor.  It radiates to his left thigh and is worse with inspiration.  Pain improves with lying flat.  He has no associated testicular or groin pain.  He endorses associated constipation with his last bowel movement being Sunday but he denies any melena or hematochezia.  He has not tried any medication for his symptoms.  He denies any nausea, vomiting, chest pain, shortness of breath, fever or chills, dysuria, hematuria.  Of note, he reports a history of tachycardia.    REVIEW OF SYSTEMS  See HPI for further details.  Abdominal pain.  All other systems are negative.     PAST MEDICAL HISTORY   has a past medical history of GERD (gastroesophageal reflux disease).    SOCIAL HISTORY  Social History     Tobacco Use   • Smoking status: Former Smoker     Types: Cigarettes   • Smokeless tobacco: Never Used   • Tobacco comment: 5 cigs a day   Vaping Use   • Vaping Use: Every day   • Substances: Nicotine   Substance and Sexual Activity   • Alcohol use: Yes     Comment: occasionally   • Drug use: No   • Sexual activity: Yes     Partners: Female     Birth control/protection: Condom       SURGICAL HISTORY   has a past surgical history that includes appendectomy laparoscopic (9/14/2018) and appendectomy.    CURRENT MEDICATIONS  Home Medications    **Home medications have not yet been reviewed for this encounter**         ALLERGIES  No Known Allergies    PHYSICAL EXAM  VITAL SIGNS: /92   Pulse (!) 117   Temp 36.5 °C (97.7 °F) (Oral)   Resp 18   Ht 1.727 m (5' 8\")   Wt 85.3 kg (188 lb)   SpO2 98%   BMI 28.59 kg/m²    Pulse ox interpretation: I " interpret this pulse ox as normal.  Constitutional: Alert in no apparent distress.  HENT: No signs of trauma, Bilateral external ears normal, Nose normal.  Moist mucous membranes.  Eyes: Pupils are equal and reactive, Conjunctiva normal, Non-icteric.   Neck: Normal range of motion, No tenderness, Supple, No stridor.   Lymphatic: No lymphadenopathy noted.   Cardiovascular: Tachycardic with regular rhythm, no murmurs. Pulses symmetrical.  Thorax & Lungs: Normal breath sounds, No respiratory distress, No wheezing, No chest tenderness.   Abdomen: Bowel sounds normal, Soft, No tenderness, No masses, No pulsatile masses. No peritoneal signs.  Skin: Warm, Dry, No erythema, No rash.   Back: No bony tenderness, no CVA tenderness.   Extremities: Intact distal pulses, No edema, No tenderness, No cyanosis.  Musculoskeletal: Good range of motion in all major joints. No tenderness to palpation or major deformities noted.   Neurologic: Alert, Normal motor function, Normal sensory function, No focal deficits noted.   Psychiatric: Affect normal, Judgment normal, Mood normal.     DIAGNOSTIC STUDIES / PROCEDURES    LABS  Results for orders placed or performed during the hospital encounter of 05/25/21   CBC WITH DIFFERENTIAL   Result Value Ref Range    WBC 9.0 4.8 - 10.8 K/uL    RBC 5.58 4.70 - 6.10 M/uL    Hemoglobin 15.5 14.0 - 18.0 g/dL    Hematocrit 46.4 42.0 - 52.0 %    MCV 83.2 81.4 - 97.8 fL    MCH 27.8 27.0 - 33.0 pg    MCHC 33.4 (L) 33.7 - 35.3 g/dL    RDW 42.6 35.9 - 50.0 fL    Platelet Count 349 164 - 446 K/uL    MPV 10.4 9.0 - 12.9 fL    Neutrophils-Polys 55.50 44.00 - 72.00 %    Lymphocytes 28.10 22.00 - 41.00 %    Monocytes 10.50 0.00 - 13.40 %    Eosinophils 4.90 0.00 - 6.90 %    Basophils 0.70 0.00 - 1.80 %    Immature Granulocytes 0.30 0.00 - 0.90 %    Nucleated RBC 0.00 /100 WBC    Neutrophils (Absolute) 5.01 1.82 - 7.42 K/uL    Lymphs (Absolute) 2.54 1.00 - 4.80 K/uL    Monos (Absolute) 0.95 (H) 0.00 - 0.85 K/uL     Eos (Absolute) 0.44 0.00 - 0.51 K/uL    Baso (Absolute) 0.06 0.00 - 0.12 K/uL    Immature Granulocytes (abs) 0.03 0.00 - 0.11 K/uL    NRBC (Absolute) 0.00 K/uL   COMP METABOLIC PANEL   Result Value Ref Range    Sodium 141 135 - 145 mmol/L    Potassium 4.0 3.6 - 5.5 mmol/L    Chloride 105 96 - 112 mmol/L    Co2 21 20 - 33 mmol/L    Anion Gap 15.0 7.0 - 16.0    Glucose 101 (H) 65 - 99 mg/dL    Bun 19 8 - 22 mg/dL    Creatinine 0.87 0.50 - 1.40 mg/dL    Calcium 9.4 8.5 - 10.5 mg/dL    AST(SGOT) 40 12 - 45 U/L    ALT(SGPT) 58 (H) 2 - 50 U/L    Alkaline Phosphatase 74 30 - 99 U/L    Total Bilirubin 0.5 0.1 - 1.5 mg/dL    Albumin 4.3 3.2 - 4.9 g/dL    Total Protein 7.4 6.0 - 8.2 g/dL    Globulin 3.1 1.9 - 3.5 g/dL    A-G Ratio 1.4 g/dL   TROPONIN   Result Value Ref Range    Troponin T 7 6 - 19 ng/L   LIPASE   Result Value Ref Range    Lipase 24 11 - 82 U/L   D-DIMER   Result Value Ref Range    D-Dimer Screen 0.43 0.00 - 0.50 ug/mL (FEU)   TSH WITH REFLEX TO FT4   Result Value Ref Range    TSH 1.850 0.380 - 5.330 uIU/mL   ESTIMATED GFR   Result Value Ref Range    GFR If African American >60 >60 mL/min/1.73 m 2    GFR If Non African American >60 >60 mL/min/1.73 m 2   EKG   Result Value Ref Range    Report       [facility]    Test Date:  2021  Pt Name:    TRE VIRK               Department: ER  MRN:        [id number]                      Room:  Gender:     Male                         Technician: [id number]  :        1985                   Requested By:ER TRIAGE PROTOCOL  Order #:    [id number]                    Reading MD: Waldemar Burrell MD    Measurements  Intervals                                Axis  Rate:       127                          P:          63  RI:         130                          QRS:        22  QRSD:       101                          T:          63  QT:         325  QTc:        473    Interpretive Statements  Sinus tachycardia  Borderline prolonged QT  interval  Compared to ECG 07/14/2020 21:13:13  Sinus rhythm no longer present  Electronically Signed On 5- 4:34:40 PDT by Waldemar Burrell MD       RADIOLOGY  CXR  CT abd/pelvis    COURSE & MEDICAL DECISION MAKING  Pertinent Labs & Imaging studies reviewed. (See chart for details)  Records obtained and reviewed: Patient was most recently seen by his primary care physician 5/9/2021 at which time his heart rate was 131.  Prior to that he had a visit to an outpatient clinic in April at which time his heart rate was 128.  His most recent visit to the ER was for suicidal ideation in November of last year at which time his heart rate was 122.  He was noted to have had a cardiology work-up including Holter monitoring, echocardiogram, and propranolol prescription.    This is a 35-year-old male who is here with left lower quadrant abdominal pain.  Differential diagnosis includes, but is not limited to, constipation, diverticulitis, colitis, bowel obstruction.  Arrives tachycardic (this appears to be his baseline) but afebrile with otherwise normal vital signs.  He appears well-hydrated and nontoxic.  His abdomen is soft without any tenderness.  No obvious masses to suggest hernia.  He is declining any pain medication presently.    CBC is reassuring without leukocytosis or left shift.  Metabolic panel reveals mildly elevated blood glucose to 101 with an ALT of 58.  Lipase is normal.  Troponin is negative.  D-dimer is negative and thyroid function is within normal limits.    Chest x-ray reveals mild basilar atelectasis.  CT of the abdomen/pelvis was ordered and patient care was transferred to my colleague, Dr. Gomez, who will follow up the CT scan and determine final dispo.     FINAL IMPRESSION  1. Palpitations     2. Abdominal pain, unspecified abdominal location         Electronically signed by: Waldemar Burrell M.D., 5/25/2021 4:52 AM

## 2021-05-25 NOTE — ED NOTES
Patient ambulated independently with steady gait to room 11.  Continues to complain of left abdominal pain described as sharp and stabbing, exacerbated by movement.  Vitals taken, stable, patient oriented to call light and lights dimmed.

## 2021-05-25 NOTE — ED PROVIDER NOTES
ED Provider Note    Patient received in signout from Dr Burrell at 8:38 AM    Briefly, pt is 35 presenting with abdominal pain palpitations.  Has had unremarkable labs    Workup pending is CT abdomen pelvis, if negative patient will be discharge    10:10 AM  CT is negative, plan for discharge    CT-ABDOMEN-PELVIS WITH   Final Result         1.  Cholelithiasis.   2.  Hepatic steatosis.   3.  No acute inflammatory abnormality is seen.               DX-CHEST-PORTABLE (1 VIEW)   Final Result      Stable chest with mild basilar atelectasis favored over scarring

## 2021-08-31 ENCOUNTER — HOSPITAL ENCOUNTER (OUTPATIENT)
Facility: MEDICAL CENTER | Age: 36
End: 2021-08-31
Attending: STUDENT IN AN ORGANIZED HEALTH CARE EDUCATION/TRAINING PROGRAM
Payer: COMMERCIAL

## 2021-08-31 ENCOUNTER — OFFICE VISIT (OUTPATIENT)
Dept: URGENT CARE | Facility: CLINIC | Age: 36
End: 2021-08-31
Payer: COMMERCIAL

## 2021-08-31 VITALS
HEIGHT: 68 IN | BODY MASS INDEX: 28.49 KG/M2 | RESPIRATION RATE: 20 BRPM | OXYGEN SATURATION: 97 % | HEART RATE: 104 BPM | WEIGHT: 188 LBS | TEMPERATURE: 97.6 F | DIASTOLIC BLOOD PRESSURE: 86 MMHG | SYSTOLIC BLOOD PRESSURE: 134 MMHG

## 2021-08-31 DIAGNOSIS — J06.9 VIRAL URI WITH COUGH: ICD-10-CM

## 2021-08-31 DIAGNOSIS — Z20.822 COVID-19 RULED OUT: ICD-10-CM

## 2021-08-31 PROCEDURE — 99213 OFFICE O/P EST LOW 20 MIN: CPT | Mod: CS | Performed by: STUDENT IN AN ORGANIZED HEALTH CARE EDUCATION/TRAINING PROGRAM

## 2021-08-31 PROCEDURE — U0003 INFECTIOUS AGENT DETECTION BY NUCLEIC ACID (DNA OR RNA); SEVERE ACUTE RESPIRATORY SYNDROME CORONAVIRUS 2 (SARS-COV-2) (CORONAVIRUS DISEASE [COVID-19]), AMPLIFIED PROBE TECHNIQUE, MAKING USE OF HIGH THROUGHPUT TECHNOLOGIES AS DESCRIBED BY CMS-2020-01-R: HCPCS

## 2021-08-31 PROCEDURE — U0005 INFEC AGEN DETEC AMPLI PROBE: HCPCS

## 2021-08-31 ASSESSMENT — FIBROSIS 4 INDEX: FIB4 SCORE: 0.5

## 2021-08-31 NOTE — PROGRESS NOTES
Subjective:   CHIEF COMPLAINT  Chief Complaint   Patient presents with   • Body Aches     nurse at work requested pt to be tested for Covid, slight cough, sore throat, sneezing, unable to breath through his nose x 3 days       HPI  Олег Ramirez is a 35 y.o. male who presents requesting to be tested for Covid.  Patient was sent over by his employer.  For 3 days he has been experiencing body aches, nasal congestion associated with difficulty breathing through his nose, sore throat and cough.  He has not tried any medications.  No wheezing or shortness of breath.  He is not vaccinated against Covid.    REVIEW OF SYSTEMS  General: no fever or chills  GI: no nausea or vomiting  See HPI for further details.     PAST MEDICAL HISTORY  Patient Active Problem List    Diagnosis Date Noted   • Migraine without aura 04/22/2021   • Syncope and collapse 07/29/2020   • Palpitations 06/03/2020   • Gastroesophageal reflux disease without esophagitis 06/03/2020   • Tachycardia 06/03/2020   • Prolonged QT syndrome 06/03/2020       SURGICAL HISTORY   has a past surgical history that includes appendectomy laparoscopic (9/14/2018) and appendectomy.    ALLERGIES  No Known Allergies    CURRENT MEDICATIONS  Home Medications     Reviewed by Mike Felipe D.O. (Physician) on 08/31/21 at 1619  Med List Status: <None>   Medication Last Dose Status   asa/apap/caffeine (EXCEDRIN) 250-250-65 MG Tab Not Taking Active   omeprazole (PRILOSEC) 20 MG delayed-release capsule Taking Active   propranolol (INDERAL) 40 MG Tab Not Taking Active   sucralfate (CARAFATE) 1 GM Tab PRN Active                SOCIAL HISTORY  Social History     Tobacco Use   • Smoking status: Former Smoker     Types: Cigarettes   • Smokeless tobacco: Never Used   • Tobacco comment: 5 cigs a day   Vaping Use   • Vaping Use: Every day   • Substances: Nicotine   Substance and Sexual Activity   • Alcohol use: Not Currently     Comment: occasionally   • Drug use: No   • Sexual  "activity: Yes     Partners: Female     Birth control/protection: Condom       FAMILY HISTORY  Family History   Problem Relation Age of Onset   • Cancer Paternal Grandfather         esophageal          Objective:   PHYSICAL EXAM  VITAL SIGNS: /86 (BP Location: Left arm, Patient Position: Supine, BP Cuff Size: Adult)   Pulse (!) 104   Temp 36.4 °C (97.6 °F) (Temporal)   Resp 20   Ht 1.727 m (5' 8\")   Wt 85.3 kg (188 lb)   SpO2 97%   BMI 28.59 kg/m²     Gen: no acute distress, normal voice  Skin: dry, intact, moist mucosal membranes  ENT: Mild pharyngeal erythema without exudates.  Uvula midline.  Lungs: CTAB w/ symmetric expansion  CV: RRR w/o murmurs or clicks  Psych: normal affect, normal judgement, alert, awake    Assessment/Plan:     1. COVID-19 ruled out  COVID/SARS CoV-2 PCR   2. Viral URI with cough     Signs and symptoms are consistent with a viral upper respiratory tract infection.  He is not vaccinated against.   -Ordered Covid.  Results will be sent through [employer].  -Instructed to quarantine until Covid results have been returned  -Encouraged hydration and symptomatic treatment with Tylenol/ibuprofen prn  -Discussed red flags and return precautions.  Patient verbalized their understanding.  All questions were answered.    Differential diagnosis, natural history, supportive care, and indications for immediate follow-up discussed. Patient agrees with the plan of care.    Follow-up as needed if symptoms worsen or fail to improve to PCP, Urgent care or Emergency Room.       Please note that this dictation was created using voice recognition software. I have made a reasonable attempt to correct obvious errors, but I expect that there are errors of grammar and possibly content that I did not discover before finalizing the note.  "

## 2021-09-01 DIAGNOSIS — Z20.822 COVID-19 RULED OUT: ICD-10-CM

## 2021-09-01 LAB — COVID ORDER STATUS COVID19: NORMAL

## 2021-09-02 LAB
SARS-COV-2 RNA RESP QL NAA+PROBE: NOTDETECTED
SPECIMEN SOURCE: NORMAL

## 2021-09-03 ENCOUNTER — TELEPHONE (OUTPATIENT)
Dept: URGENT CARE | Facility: CLINIC | Age: 36
End: 2021-09-03

## 2021-09-03 NOTE — TELEPHONE ENCOUNTER
Patient called asking if we could fill out [place] paperwork. We informed him we could not, and patient then asked if provider could write him a note clearing him to return to work. Please advise.

## 2021-09-05 ENCOUNTER — OFFICE VISIT (OUTPATIENT)
Dept: URGENT CARE | Facility: CLINIC | Age: 36
End: 2021-09-05
Payer: COMMERCIAL

## 2021-09-05 VITALS
HEART RATE: 122 BPM | TEMPERATURE: 97 F | WEIGHT: 187 LBS | BODY MASS INDEX: 28.34 KG/M2 | SYSTOLIC BLOOD PRESSURE: 126 MMHG | DIASTOLIC BLOOD PRESSURE: 72 MMHG | OXYGEN SATURATION: 95 % | HEIGHT: 68 IN | RESPIRATION RATE: 20 BRPM

## 2021-09-05 DIAGNOSIS — J40 BRONCHITIS: ICD-10-CM

## 2021-09-05 PROCEDURE — 99214 OFFICE O/P EST MOD 30 MIN: CPT | Performed by: PHYSICIAN ASSISTANT

## 2021-09-05 RX ORDER — BENZONATATE 100 MG/1
100 CAPSULE ORAL 3 TIMES DAILY PRN
Qty: 30 CAPSULE | Refills: 0 | Status: SHIPPED | OUTPATIENT
Start: 2021-09-05 | End: 2021-09-15

## 2021-09-05 RX ORDER — PREDNISONE 10 MG/1
20 TABLET ORAL DAILY
Qty: 6 TABLET | Refills: 0 | Status: SHIPPED | OUTPATIENT
Start: 2021-09-05 | End: 2021-09-08

## 2021-09-05 RX ORDER — DEXTROMETHORPHAN HYDROBROMIDE AND PROMETHAZINE HYDROCHLORIDE 15; 6.25 MG/5ML; MG/5ML
5 SYRUP ORAL EVERY 6 HOURS PRN
Qty: 118 ML | Refills: 0 | Status: SHIPPED | OUTPATIENT
Start: 2021-09-05 | End: 2021-09-12

## 2021-09-05 ASSESSMENT — ENCOUNTER SYMPTOMS
CHILLS: 0
SHORTNESS OF BREATH: 0
SORE THROAT: 0
VOMITING: 0
EYE PAIN: 0
NAUSEA: 0
ABDOMINAL PAIN: 0
DIARRHEA: 0
COUGH: 1
CONSTIPATION: 0
HEADACHES: 1
MYALGIAS: 0
FEVER: 0

## 2021-09-05 ASSESSMENT — FIBROSIS 4 INDEX: FIB4 SCORE: 0.5

## 2021-09-05 NOTE — LETTER
September 5, 2021    To Whom It May Concern:         This is confirmation that Олег Ramirez attended his scheduled appointment with Khanh Bocanegra P.A.-C. on 9/05/21.  I have recommended patient take today through 9/7/21 off for medical reasons, he is cleared to return to work thereafter. He already has received a negative Covid test at an earlier visit.         If you have any questions please do not hesitate to call me at the phone number listed below.    Sincerely,          Khanh Bocanegra P.A.-C.  [phone]

## 2021-09-05 NOTE — TELEPHONE ENCOUNTER
Called patient and relayed information from provider. Patient understood, but said he was still experiencing symptoms and not ready to return to work yet. Counseled patient to seek care at Urgent Care or ER if symptoms worsened.

## 2021-09-05 NOTE — PROGRESS NOTES
Subjective:   Олег Ramirez is a 35 y.o. male who presents for Cough (lv: 09/03/2021, coughing fits, wheezzing: gets headaches from it, asking for a work note for 3 days)      HPI:  This is a pleasant 35-year-old male returning to urgent care as since previous visit on 8/31/2021 he has continued to have a cough, coughing so hard he gets a headache as well as subjective wheezing.  He is a former smoker, has no history of asthma, does not frequently get bronchitis require use of inhaler.  He denies any dyspnea.  His other symptoms have improved.  His previous Covid test was negative.  He has no close Covid positive contacts.    Review of Systems   Constitutional: Negative for chills and fever.   HENT: Negative for congestion, ear pain and sore throat.    Eyes: Negative for pain.   Respiratory: Positive for cough. Negative for shortness of breath.    Cardiovascular: Negative for chest pain.   Gastrointestinal: Negative for abdominal pain, constipation, diarrhea, nausea and vomiting.   Genitourinary: Negative for dysuria.   Musculoskeletal: Negative for myalgias.   Skin: Negative for rash.   Neurological: Positive for headaches.       Medications:    • benzonatate Caps  • predniSONE Tabs  • promethazine-dextromethorphan    Allergies: Patient has no known allergies.    Problem List: Олег Ramirez does not have any pertinent problems on file.    Surgical History:  Past Surgical History:   Procedure Laterality Date   • APPENDECTOMY LAPAROSCOPIC  9/14/2018    Procedure: APPENDECTOMY LAPAROSCOPIC;  Surgeon: Baljit Mondragon M.D.;  Location: SURGERY [facility];  Service: General   • APPENDECTOMY         Past Social Hx: Олег Ramirez  reports that he has quit smoking. His smoking use included cigarettes. He has never used smokeless tobacco. He reports previous alcohol use. He reports that he does not use drugs.     Past Family Hx:  Олег Ramirez family history includes Cancer in his paternal  "grandfather.     Problem list, medications, and allergies reviewed by myself today in Epic.     Objective:     /72 (BP Location: Left arm, Patient Position: Sitting, BP Cuff Size: Adult)   Pulse (!) 122   Temp 36.1 °C (97 °F) (Temporal)   Resp 20   Ht 1.727 m (5' 8\")   Wt 84.8 kg (187 lb)   SpO2 95%   BMI 28.43 kg/m²     Physical Exam  Vitals reviewed.   Constitutional:       Appearance: Normal appearance. He is not ill-appearing or toxic-appearing.   HENT:      Head: Normocephalic and atraumatic.      Right Ear: External ear normal.      Left Ear: External ear normal.      Nose: Nose normal.      Mouth/Throat:      Mouth: Mucous membranes are moist.   Eyes:      Conjunctiva/sclera: Conjunctivae normal.   Cardiovascular:      Rate and Rhythm: Normal rate and regular rhythm.   Pulmonary:      Effort: Pulmonary effort is normal.      Comments: No wheezes rhonchi or rales, good symmetrical breath expansion, normal pulmonary exam  Skin:     General: Skin is warm and dry.      Capillary Refill: Capillary refill takes less than 2 seconds.   Neurological:      Mental Status: He is alert and oriented to person, place, and time.         Assessment/Plan:     Diagnosis and associated orders:     1. Bronchitis  promethazine-dextromethorphan (PROMETHAZINE-DM) 6.25-15 MG/5ML syrup    benzonatate (TESSALON) 100 MG Cap    predniSONE (DELTASONE) 10 MG Tab      Comments/MDM:     • Prolonged duration of the patient's symptoms likely represent bronchitis.  Recommend trial of prednisone, Tessalon, Promethazine DM cough syrup.  Patient cautioned about sedation from the cough syrup.  Recommend humidifier in bedroom, vocal rest, allergen and smoke/wildfire avoidance.  Patient provided with an additional 3 days off of work, recommend primary if patient seeks to pursue FMLA.  Considered alternate differential diagnoses including pulmonary embolus, airway obstruction, asthma exacerbation         Differential diagnosis, natural " history, supportive care, and indications for immediate follow-up discussed.    Advised the patient to follow-up with the primary care physician for recheck, reevaluation, and consideration of further management.    Please note that this dictation was created using voice recognition software. I have made a reasonable attempt to correct obvious errors, but I expect that there are errors of grammar and possibly content that I did not discover before finalizing the note.    This note was electronically signed by Khanh Bocanegra PA-C

## 2021-09-15 ENCOUNTER — OFFICE VISIT (OUTPATIENT)
Dept: URGENT CARE | Facility: CLINIC | Age: 36
End: 2021-09-15
Payer: COMMERCIAL

## 2021-09-15 VITALS
HEART RATE: 137 BPM | WEIGHT: 185.8 LBS | RESPIRATION RATE: 16 BRPM | SYSTOLIC BLOOD PRESSURE: 124 MMHG | DIASTOLIC BLOOD PRESSURE: 88 MMHG | BODY MASS INDEX: 28.16 KG/M2 | HEIGHT: 68 IN | TEMPERATURE: 97.7 F | OXYGEN SATURATION: 99 %

## 2021-09-15 DIAGNOSIS — K21.9 BILE ACID ESOPHAGEAL REFLUX: ICD-10-CM

## 2021-09-15 DIAGNOSIS — Z00.00 HEALTH CARE MAINTENANCE: ICD-10-CM

## 2021-09-15 DIAGNOSIS — R00.0 TACHYCARDIA: ICD-10-CM

## 2021-09-15 PROCEDURE — 99214 OFFICE O/P EST MOD 30 MIN: CPT | Performed by: NURSE PRACTITIONER

## 2021-09-15 PROCEDURE — 93000 ELECTROCARDIOGRAM COMPLETE: CPT | Performed by: NURSE PRACTITIONER

## 2021-09-15 RX ORDER — OMEPRAZOLE 20 MG/1
20 CAPSULE, DELAYED RELEASE ORAL DAILY
COMMUNITY
End: 2021-09-15

## 2021-09-15 RX ORDER — OMEPRAZOLE 40 MG/1
40 CAPSULE, DELAYED RELEASE ORAL DAILY
Qty: 30 CAPSULE | Refills: 0 | Status: SHIPPED | OUTPATIENT
Start: 2021-09-15 | End: 2021-10-15

## 2021-09-15 ASSESSMENT — ENCOUNTER SYMPTOMS
DIZZINESS: 0
SYNCOPE: 0
SORE THROAT: 0
FEVER: 0
HEARTBURN: 1
PALPITATIONS: 1
HOARSE VOICE: 0
CHOKING: 1
COUGH: 1
NAUSEA: 0
NEAR-SYNCOPE: 0
DIAPHORESIS: 0
ABDOMINAL PAIN: 0

## 2021-09-15 ASSESSMENT — FIBROSIS 4 INDEX: FIB4 SCORE: 0.5

## 2021-09-15 NOTE — PATIENT INSTRUCTIONS
Sinus Tachycardia    Sinus tachycardia is a kind of fast heartbeat. In sinus tachycardia, the heart beats more than 100 times a minute. Sinus tachycardia starts in a part of the heart called the sinus node. Sinus tachycardia may be harmless, or it may be a sign of a serious condition.  What are the causes?  This condition may be caused by:  · Exercise or exertion.  · A fever.  · Pain.  · Loss of body fluids (dehydration).  · Severe bleeding (hemorrhage).  · Anxiety and stress.  · Certain substances, including:  ? Alcohol.  ? Caffeine.  ? Tobacco and nicotine products.  ? Cold medicines.  ? Illegal drugs.  · Medical conditions including:  ? Heart disease.  ? An infection.  ? An overactive thyroid (hyperthyroidism).  ? A lack of red blood cells (anemia).  What are the signs or symptoms?  Symptoms of this condition include:  · A feeling that the heart is beating quickly (palpitations).  · Suddenly noticing your heartbeat (cardiac awareness).  · Dizziness.  · Tiredness (fatigue).  · Shortness of breath.  · Chest pain.  · Nausea.  · Fainting.  How is this diagnosed?  This condition is diagnosed with:  · A physical exam.  · Other tests, such as:  ? Blood tests.  ? An electrocardiogram (ECG). This test measures the electrical activity of the heart.  ? Ambulatory cardiac monitor. This records your heartbeats for 24 hours or more.  You may be referred to a heart specialist (cardiologist).  How is this treated?  Treatment for this condition depends on the cause or the underlying condition. Treatment may involve:  · Treating the underlying condition.  · Taking new medicines or changing your current medicines as told by your health care provider.  · Making changes to your diet or lifestyle.  Follow these instructions at home:  Lifestyle    · Do not use any products that contain nicotine or tobacco, such as cigarettes and e-cigarettes. If you need help quitting, ask your health care provider.  · Do not use illegal drugs, such as  cocaine.  · Learn relaxation methods to help you when you get stressed or anxious. These include deep breathing.  · Avoid caffeine or other stimulants.  Alcohol use    · Do not drink alcohol if:  ? Your health care provider tells you not to drink.  ? You are pregnant, may be pregnant, or are planning to become pregnant.  · If you drink alcohol, limit how much you have:  ? 0-1 drink a day for women.  ? 0-2 drinks a day for men.  · Be aware of how much alcohol is in your drink. In the [place] one drink equals one typical bottle of beer (12 oz), one-half glass of wine (5 oz), or one shot of hard liquor (1½ oz).  General instructions  · Drink enough fluids to keep your urine pale yellow.  · Take over-the-counter and prescription medicines only as told by your health care provider.  · Keep all follow-up visits as told by your health care provider. This is important.  Contact a health care provider if you have:  · A fever.  · Vomiting or diarrhea that does not go away.  Get help right away if you:  · Have pain in your chest, upper arms, jaw, or neck.  · Become weak or dizzy.  · Feel faint.  · Have palpitations that do not go away.  Summary  · In sinus tachycardia, the heart beats more than 100 times a minute.  · Sinus tachycardia may be harmless, or it may be a sign of a serious condition.  · Treatment for this condition depends on the cause or the underlying condition.  · Get help right away if you have pain in your chest, upper arms, jaw, or neck.  This information is not intended to replace advice given to you by your health care provider. Make sure you discuss any questions you have with your health care provider.  Document Released: 01/25/2006 Document Revised: 02/06/2019 Document Reviewed: 02/06/2019  Elsevier Patient Education © 2020 [employer]  Gastroesophageal Reflux Disease, Adult  Gastroesophageal reflux (SOCO) happens when acid from the stomach flows up into the tube that connects the mouth and the stomach  (esophagus). Normally, food travels down the esophagus and stays in the stomach to be digested. However, when a person has SOCO, food and stomach acid sometimes move back up into the esophagus. If this becomes a more serious problem, the person may be diagnosed with a disease called gastroesophageal reflux disease (GERD). GERD occurs when the reflux:  · Happens often.  · Causes frequent or severe symptoms.  · Causes problems such as damage to the esophagus.  When stomach acid comes in contact with the esophagus, the acid may cause soreness (inflammation) in the esophagus. Over time, GERD may create small holes (ulcers) in the lining of the esophagus.  What are the causes?  This condition is caused by a problem with the muscle between the esophagus and the stomach (lower esophageal sphincter, or LES). Normally, the LES muscle closes after food passes through the esophagus to the stomach. When the LES is weakened or abnormal, it does not close properly, and that allows food and stomach acid to go back up into the esophagus.  The LES can be weakened by certain dietary substances, medicines, and medical conditions, including:  · Tobacco use.  · Pregnancy.  · Having a hiatal hernia.  · Alcohol use.  · Certain foods and beverages, such as coffee, chocolate, onions, and peppermint.  What increases the risk?  You are more likely to develop this condition if you:  · Have an increased body weight.  · Have a connective tissue disorder.  · Use NSAID medicines.  What are the signs or symptoms?  Symptoms of this condition include:  · Heartburn.  · Difficult or painful swallowing.  · The feeling of having a lump in the throat.  · A bitter taste in the mouth.  · Bad breath.  · Having a large amount of saliva.  · Having an upset or bloated stomach.  · Belching.  · Chest pain. Different conditions can cause chest pain. Make sure you see your health care provider if you experience chest pain.  · Shortness of breath or  wheezing.  · Ongoing (chronic) cough or a night-time cough.  · Wearing away of tooth enamel.  · Weight loss.  How is this diagnosed?  Your health care provider will take a medical history and perform a physical exam. To determine if you have mild or severe GERD, your health care provider may also monitor how you respond to treatment. You may also have tests, including:  · A test to examine your stomach and esophagus with a small camera (endoscopy).  · A test that measures the acidity level in your esophagus.  · A test that measures how much pressure is on your esophagus.  · A barium swallow or modified barium swallow test to show the shape, size, and functioning of your esophagus.  How is this treated?  The goal of treatment is to help relieve your symptoms and to prevent complications. Treatment for this condition may vary depending on how severe your symptoms are. Your health care provider may recommend:  · Changes to your diet.  · Medicine.  · Surgery.  Follow these instructions at home:  Eating and drinking    · Follow a diet as recommended by your health care provider. This may involve avoiding foods and drinks such as:  ? Coffee and tea (with or without caffeine).  ? Drinks that contain alcohol.  ? Energy drinks and sports drinks.  ? Carbonated drinks or sodas.  ? Chocolate and cocoa.  ? Peppermint and mint flavorings.  ? Garlic and onions.  ? Horseradish.  ? Spicy and acidic foods, including peppers, chili powder, mg powder, vinegar, hot sauces, and barbecue sauce.  ? Citrus fruit juices and citrus fruits, such as oranges, rut, and limes.  ? Tomato-based foods, such as red sauce, chili, salsa, and pizza with red sauce.  ? Fried and fatty foods, such as donuts, french fries, potato chips, and high-fat dressings.  ? High-fat meats, such as hot dogs and fatty cuts of red and white meats, such as rib eye steak, sausage, ham, and hunter.  ? High-fat dairy items, such as whole milk, butter, and cream  cheese.  · Eat small, frequent meals instead of large meals.  · Avoid drinking large amounts of liquid with your meals.  · Avoid eating meals during the 2-3 hours before bedtime.  · Avoid lying down right after you eat.  · Do not exercise right after you eat.  Lifestyle    · Do not use any products that contain nicotine or tobacco, such as cigarettes, e-cigarettes, and chewing tobacco. If you need help quitting, ask your health care provider.  · Try to reduce your stress by using methods such as yoga or meditation. If you need help reducing stress, ask your health care provider.  · If you are overweight, reduce your weight to an amount that is healthy for you. Ask your health care provider for guidance about a safe weight loss goal.  General instructions  · Pay attention to any changes in your symptoms.  · Take over-the-counter and prescription medicines only as told by your health care provider. Do not take aspirin, ibuprofen, or other NSAIDs unless your health care provider told you to do so.  · Wear loose-fitting clothing. Do not wear anything tight around your waist that causes pressure on your abdomen.  · Raise (elevate) the head of your bed about 6 inches (15 cm).  · Avoid bending over if this makes your symptoms worse.  · Keep all follow-up visits as told by your health care provider. This is important.  Contact a health care provider if:  · You have:  ? New symptoms.  ? Unexplained weight loss.  ? Difficulty swallowing or it hurts to swallow.  ? Wheezing or a persistent cough.  ? A hoarse voice.  · Your symptoms do not improve with treatment.  Get help right away if you:  · Have pain in your arms, neck, jaw, teeth, or back.  · Feel sweaty, dizzy, or light-headed.  · Have chest pain or shortness of breath.  · Vomit and your vomit looks like blood or coffee grounds.  · Faint.  · Have stool that is bloody or black.  · Cannot swallow, drink, or eat.  Summary  · Gastroesophageal reflux happens when acid from the  stomach flows up into the esophagus. GERD is a disease in which the reflux happens often, causes frequent or severe symptoms, or causes problems such as damage to the esophagus.  · Treatment for this condition may vary depending on how severe your symptoms are. Your health care provider may recommend diet and lifestyle changes, medicine, or surgery.  · Contact a health care provider if you have new or worsening symptoms.  · Take over-the-counter and prescription medicines only as told by your health care provider. Do not take aspirin, ibuprofen, or other NSAIDs unless your health care provider told you to do so.  · Keep all follow-up visits as told by your health care provider. This is important.  This information is not intended to replace advice given to you by your health care provider. Make sure you discuss any questions you have with your health care provider.  Document Released: 09/27/2006 Document Revised: 06/26/2019 Document Reviewed: 06/26/2019  Elsevier Patient Education © 2020 Else[employer] Inc[employer]  Food Choices for Gastroesophageal Reflux Disease, Adult  When you have gastroesophageal reflux disease (GERD), the foods you eat and your eating habits are very important. Choosing the right foods can help ease your discomfort. Think about working with a nutrition specialist (dietitian) to help you make good choices.  What are tips for following this plan?    Meals  · Choose healthy foods that are low in fat, such as fruits, vegetables, whole grains, low-fat dairy products, and lean meat, fish, and poultry.  · Eat small meals often instead of 3 large meals a day. Eat your meals slowly, and in a place where you are relaxed. Avoid bending over or lying down until 2-3 hours after eating.  · Avoid eating meals 2-3 hours before bed.  · Avoid drinking a lot of liquid with meals.  · Cook foods using methods other than frying. Bake, grill, or broil food instead.  · Avoid or limit:  ? Chocolate.  ? Peppermint or  spearmint.  ? Alcohol.  ? Pepper.  ? Black and decaffeinated coffee.  ? Black and decaffeinated tea.  ? Bubbly (carbonated) soft drinks.  ? Caffeinated energy drinks and soft drinks.  · Limit high-fat foods such as:  ? Fatty meat or fried foods.  ? Whole milk, cream, butter, or ice cream.  ? Nuts and nut butters.  ? Pastries, donuts, and sweets made with butter or shortening.  · Avoid foods that cause symptoms. These foods may be different for everyone. Common foods that cause symptoms include:  ? Tomatoes.  ? Oranges, rut, and limes.  ? Peppers.  ? Spicy food.  ? Onions and garlic.  ? Vinegar.  Lifestyle  · Maintain a healthy weight. Ask your doctor what weight is healthy for you. If you need to lose weight, work with your doctor to do so safely.  · Exercise for at least 30 minutes for 5 or more days each week, or as told by your doctor.  · Wear loose-fitting clothes.  · Do not smoke. If you need help quitting, ask your doctor.  · Sleep with the head of your bed higher than your feet. Use a wedge under the mattress or blocks under the bed frame to raise the head of the bed.  Summary  · When you have gastroesophageal reflux disease (GERD), food and lifestyle choices are very important in easing your symptoms.  · Eat small meals often instead of 3 large meals a day. Eat your meals slowly, and in a place where you are relaxed.  · Limit high-fat foods such as fatty meat or fried foods.  · Avoid bending over or lying down until 2-3 hours after eating.  · Avoid peppermint and spearmint, caffeine, alcohol, and chocolate.  This information is not intended to replace advice given to you by your health care provider. Make sure you discuss any questions you have with your health care provider.  Document Released: 06/18/2013 Document Revised: 04/09/2020 Document Reviewed: 01/23/2018  Elsevier Patient Education © 2020 Elsevier Inc.

## 2021-09-15 NOTE — LETTER
September 15, 2021    To Whom It May Concern:         This is confirmation that Олег Ramirez attended his scheduled appointment with GLORIA Rivera on 9/15/21.   Please excuse him from work on 9/14/2021-9/16/2021.          If you have any questions please do not hesitate to call me at the phone number listed below.    Sincerely,          LYNDSEY Rivera.  [phone]

## 2021-09-15 NOTE — PROGRESS NOTES
Subjective:     Олег Ramirez is a 35 y.o. male who presents for Gastrophageal Reflux (heartburn, acid in esophogus, can't fall asleep, wake up choking, vomit) and Tachycardia (irregular)      Gastrophageal Reflux  He complains of choking, coughing and heartburn. He reports no abdominal pain, no chest pain, no dysphagia, no hoarse voice, no nausea or no sore throat. This is a recurrent issue. Олег has a history of GERD, N/V and difficulty of falling asleep due to waking up choking. He state he feels as though fluid is backing up into his esophagus causing him to choke. . The current episode started more than 1 month ago. The problem occurs occasionally. The problem has been gradually worsening. There are no known risk factors. He has tried an antacid and a diet change (Omeprazole, nexium, ) for the symptoms. Past procedures include an EGD (He notes his EGD 5 years ago showed severe inflammation of esophagus. He is unsure of his treatment).   Palpitations   This is a new problem. The current episode started more than 1 year ago (олег has suffered from an elevated heart rate for over a year). The problem occurs constantly. The problem has been unchanged. Associated symptoms include coughing. Pertinent negatives include no chest pain, diaphoresis, dizziness, fever, malaise/fatigue, nausea, near-syncope or syncope. Associated symptoms comments: Stress. Treatments tried: He was referred to cardiology and states he did wear a holter monitor. He states he is unsure of follow up or diagnosis.         Review of Systems   Constitutional: Negative for diaphoresis, fever and malaise/fatigue.   HENT: Negative for hoarse voice and sore throat.    Respiratory: Positive for cough and choking.    Cardiovascular: Positive for palpitations. Negative for chest pain, syncope and near-syncope.   Gastrointestinal: Positive for heartburn. Negative for abdominal pain, dysphagia and nausea.   Neurological: Negative for dizziness.        PMH:   Past Medical History:   Diagnosis Date   • GERD (gastroesophageal reflux disease)      ALLERGIES: No Known Allergies  SURGHX:   Past Surgical History:   Procedure Laterality Date   • APPENDECTOMY LAPAROSCOPIC  9/14/2018    Procedure: APPENDECTOMY LAPAROSCOPIC;  Surgeon: Baljit Mondragon M.D.;  Location: SURGERY [facility];  Service: General   • APPENDECTOMY       SOCHX:   Social History     Socioeconomic History   • Marital status: Single     Spouse name: Not on file   • Number of children: Not on file   • Years of education: Not on file   • Highest education level: Not on file   Occupational History   • Not on file   Tobacco Use   • Smoking status: Former Smoker     Types: Cigarettes   • Smokeless tobacco: Never Used   • Tobacco comment: 5 cigs a day   Vaping Use   • Vaping Use: Former   • Substances: Nicotine   Substance and Sexual Activity   • Alcohol use: Not Currently     Comment: occasionally   • Drug use: No   • Sexual activity: Yes     Partners: Female     Birth control/protection: Condom   Other Topics Concern   • Not on file   Social History Narrative   • Not on file     Social Determinants of Health     Financial Resource Strain:    • Difficulty of Paying Living Expenses:    Food Insecurity:    • Worried About Running Out of Food in the Last Year:    • Ran Out of Food in the Last Year:    Transportation Needs:    • Lack of Transportation (Medical):    • Lack of Transportation (Non-Medical):    Physical Activity:    • Days of Exercise per Week:    • Minutes of Exercise per Session:    Stress:    • Feeling of Stress :    Social Connections:    • Frequency of Communication with Friends and Family:    • Frequency of Social Gatherings with Friends and Family:    • Attends [employer] Services:    • Active Member of Clubs or Organizations:    • Attends Club or Organization Meetings:    • Marital Status:    Intimate Partner Violence:    • Fear of Current or Ex-Partner:    • Emotionally Abused:   "  • Physically Abused:    • Sexually Abused:      FH:   Family History   Problem Relation Age of Onset   • Cancer Paternal Grandfather         esophageal         Objective:   /88 (BP Location: Right arm, Patient Position: Sitting, BP Cuff Size: Adult)   Pulse (!) 137   Temp 36.5 °C (97.7 °F) (Temporal)   Resp 16   Ht 1.727 m (5' 8\")   Wt 84.3 kg (185 lb 12.8 oz)   SpO2 99%   BMI 28.25 kg/m²     Physical Exam  Vitals and nursing note reviewed.   Constitutional:       General: He is not in acute distress.     Appearance: Normal appearance. He is normal weight. He is not ill-appearing or toxic-appearing.   HENT:      Head: Normocephalic and atraumatic.      Right Ear: Tympanic membrane, ear canal and external ear normal. There is no impacted cerumen.      Left Ear: Tympanic membrane, ear canal and external ear normal. There is no impacted cerumen.      Nose: No congestion or rhinorrhea.      Mouth/Throat:      Mouth: Mucous membranes are moist.      Pharynx: No oropharyngeal exudate or posterior oropharyngeal erythema.   Eyes:      Extraocular Movements: Extraocular movements intact.      Pupils: Pupils are equal, round, and reactive to light.   Cardiovascular:      Rate and Rhythm: Normal rate and regular rhythm.      Pulses: Normal pulses.      Heart sounds: Normal heart sounds.   Pulmonary:      Effort: Pulmonary effort is normal. No respiratory distress.      Breath sounds: Normal breath sounds. No stridor. No wheezing, rhonchi or rales.   Chest:      Chest wall: No tenderness.   Abdominal:      General: Abdomen is flat. Bowel sounds are normal. There is no distension.      Palpations: Abdomen is soft. There is no mass.      Tenderness: There is no abdominal tenderness. There is no right CVA tenderness, left CVA tenderness, guarding or rebound.      Hernia: No hernia is present.   Musculoskeletal:         General: Normal range of motion.      Cervical back: Normal range of motion and neck supple. No " tenderness.   Lymphadenopathy:      Cervical: No cervical adenopathy.   Skin:     General: Skin is warm and dry.      Capillary Refill: Capillary refill takes less than 2 seconds.   Neurological:      General: No focal deficit present.      Mental Status: He is alert and oriented to person, place, and time. Mental status is at baseline.   Psychiatric:         Mood and Affect: Mood normal.         Behavior: Behavior normal.         Thought Content: Thought content normal.         Judgment: Judgment normal.       ECG: ST rate of 121  Assessment/Plan:   Assessment    1. Health care maintenance  AMB REFERRAL BACK TO [facility]   2. Tachycardia  EKG - Clinic Performed    REFERRAL TO CARDIOLOGY    AMB REFERRAL BACK TO [facility] PCP   3. Bile acid esophageal reflux  REFERRAL TO GASTROENTEROLOGY    omeprazole (PRILOSEC) 40 MG delayed-release capsule     His exam in the clinic was essentially benign.  He is a poor historian.  He was referred to follow-up with cardiology for continuous tachycardia, GI for silent GERD as well as PCP. ECG normal besides sinus tachycardia.  He has had elevated heart rate and every visit seen in the past year.  Patient given omeprazole 40 mg in half that dose will decrease his symptoms of GERD.  We discussed diet choices and to refrain from adding foods, foods with acidity or spicy food.  He verbalizes understanding and bland diet.  Patient may also take Pepcid daily for relief of symptoms.  Strict ER precautions given.  Verbalized understanding.  AVS handout given and reviewed with patient. Pt educated on red flags and when to seek treatment back in ER or UC.   Time spent evaluating this patient was at least 30 minutes and includes preparing for visit, counseling/education, exam and evaluation, obtaining history, independent interpretation and ordering labs/tests/procedures.

## 2021-09-20 ENCOUNTER — OFFICE VISIT (OUTPATIENT)
Dept: URGENT CARE | Facility: CLINIC | Age: 36
End: 2021-09-20
Payer: COMMERCIAL

## 2021-09-20 VITALS
TEMPERATURE: 97.8 F | OXYGEN SATURATION: 96 % | DIASTOLIC BLOOD PRESSURE: 94 MMHG | HEIGHT: 68 IN | WEIGHT: 186 LBS | BODY MASS INDEX: 28.19 KG/M2 | HEART RATE: 97 BPM | SYSTOLIC BLOOD PRESSURE: 124 MMHG

## 2021-09-20 DIAGNOSIS — R00.0 TACHYCARDIA: ICD-10-CM

## 2021-09-20 LAB — EKG 4674: NORMAL

## 2021-09-20 PROCEDURE — 99213 OFFICE O/P EST LOW 20 MIN: CPT | Performed by: PHYSICIAN ASSISTANT

## 2021-09-20 ASSESSMENT — ENCOUNTER SYMPTOMS
EYE DISCHARGE: 0
FEVER: 0
NAUSEA: 0
WEAKNESS: 0
IRREGULAR HEARTBEAT: 1
VOMITING: 0
PALPITATIONS: 1
HEADACHES: 0
COUGH: 0
SORE THROAT: 0
EYE REDNESS: 0
SHORTNESS OF BREATH: 0
MYALGIAS: 0
TINGLING: 0

## 2021-09-20 ASSESSMENT — FIBROSIS 4 INDEX: FIB4 SCORE: 0.5

## 2021-09-20 NOTE — LETTER
September 20, 2021         Patient: Олег Ramirez   YOB: 1985   Date of Visit: 9/20/2021           To Whom it May Concern:    Олег Ramirez was seen in my clinic on 9/20/2021. Please excuse him from work 9/19/2021, 9/20/2021, and 9/21/2021. He may return to work on 9/22/2021. Please allow Олег to participate in light duty while at work until his follow-up appointment on 108/14/2021. Олег should limit activities that require exertion such as heavy lifting.     If you have any questions or concerns, please don't hesitate to call.        Sincerely,           Tawnya Hdz P.A.-C.  Electronically Signed

## 2021-09-20 NOTE — PROGRESS NOTES
Subjective     Олег Ramirez is a 35 y.o. male who presents with Palpitations (was just seen, cant get in to Cardio until next month, doesnt want to go back to work yet, need PCP)        This is a recurrent problem.  The patient presents to clinic complaining of a continued elevated heart rate..  The patient was recently seen in clinic on 9/15/2021 for his ongoing elevated heart rate.  The patient states he was referred to cardiology at that time.  The patient states he has been experiencing an elevated heart rate over the past several months to a the year the patient states his appointment with cardiology is not until October 14, 2021.  The patient returns to clinic requesting a work note.  The patient does not want to go back to work with his persistent elevated heart rate.  The patient reports no change in his symptoms from his previous visit.  The patient reports no associated chest pain or shortness of breath.  The patient also reports no associated fever.  No cough.  No congestion.  No ear pain.  No sore throat.  No nausea/vomiting.  No lower extremity edema.  The patient states that time he experiences an irregular heartbeat, but states this is not persistent.  The patient reports no aggravating/alleviating factors.  The patient states he tries to avoid caffeine intake and stimulants.  The patient has not taken any OTC medications for his current symptoms.    Palpitations   This is a recurrent problem. The current episode started more than 1 year ago. The problem occurs intermittently. The problem has been unchanged. Nothing aggravates the symptoms. Associated symptoms include an irregular heartbeat. Pertinent negatives include no chest pain, coughing, fever, nausea, shortness of breath, vomiting or weakness. He has tried nothing for the symptoms.     PMH:  has a past medical history of GERD (gastroesophageal reflux disease).  MEDS:   Current Outpatient Medications:   •  omeprazole (PRILOSEC) 40 MG  "delayed-release capsule, Take 1 Capsule by mouth every day for 30 days., Disp: 30 Capsule, Rfl: 0  ALLERGIES: No Known Allergies  SURGHX:   Past Surgical History:   Procedure Laterality Date   • APPENDECTOMY LAPAROSCOPIC  9/14/2018    Procedure: APPENDECTOMY LAPAROSCOPIC;  Surgeon: Baljit Mondragon M.D.;  Location: SURGERY [facility];  Service: General   • APPENDECTOMY       SOCHX:  reports that he has quit smoking. His smoking use included cigarettes. He has never used smokeless tobacco. He reports previous alcohol use. He reports that he does not use drugs.  FH: Family history was reviewed, no pertinent findings to report    Review of Systems   Constitutional: Negative for fever.   HENT: Negative for congestion, ear pain and sore throat.    Eyes: Negative for discharge and redness.   Respiratory: Negative for cough and shortness of breath.    Cardiovascular: Positive for palpitations. Negative for chest pain and leg swelling.   Gastrointestinal: Negative for nausea and vomiting.   Musculoskeletal: Negative for myalgias.   Skin: Negative for rash.   Neurological: Negative for tingling, weakness and headaches.              Objective     /94   Pulse 97   Temp 36.6 °C (97.8 °F) (Temporal)   Ht 1.727 m (5' 8\")   Wt 84.4 kg (186 lb)   SpO2 96%   BMI 28.28 kg/m²      Physical Exam  Constitutional:       General: He is not in acute distress.     Appearance: Normal appearance. He is well-developed. He is not ill-appearing.   HENT:      Head: Normocephalic and atraumatic.      Right Ear: External ear normal.      Left Ear: External ear normal.   Eyes:      Extraocular Movements: Extraocular movements intact.      Conjunctiva/sclera: Conjunctivae normal.   Cardiovascular:      Rate and Rhythm: Regular rhythm. Tachycardia present.      Heart sounds: Normal heart sounds.   Pulmonary:      Effort: Pulmonary effort is normal. No respiratory distress.      Breath sounds: Normal breath sounds. No wheezing. "   Musculoskeletal:         General: Normal range of motion.      Cervical back: Normal range of motion and neck supple.   Skin:     General: Skin is warm and dry.   Neurological:      Mental Status: He is alert and oriented to person, place, and time.               Progress:  EKG:  EKG Interpretation   Interpreted by me   Rhythm: normal sinus   Rate: normal   Axis: normal   Ectopy: none   Conduction: normal   ST Segments: no acute change   T Waves: no acute change   Q Waves: none   Clinical Impression: no acute changes and normal EKG  This is unchanged from patient's previous EKG on 9/15/2021.    Reviewed the patient's previous clinic visit note on 9/15/2021.             Assessment & Plan        1. Tachycardia  - EKG    The patient's presenting symptoms and physical exam findings are consistent with tachycardia.  The patient has been experiencing ongoing tachycardia for some time.  The patient was previously seen in clinic on 9/15/2021 for the same.  The patient was referred to cardiology at that time.  The patient states his follow-up with cardiology is not until October 14, 2021.  The patient returns to clinic today requesting a work note.  The patient's physical exam today in clinic was normal with the exception of an elevated heart rate.  The patient's heart rhythm was regular without murmurs, gallops, or rubs.  Patient's lungs were clear to auscultation without wheezing, and his pulse ox was within normal limits.  The patient appears in no acute distress.  The patient's vital signs are stable and within normal limits without tachycardia.  He is afebrile today in clinic.  An EKG was obtained to further evaluate the patient's ongoing elevated heart rate. The patient's EKG today in clinic showed normal sinus rhythm with a heart rate of 98.  No acute ST segment changes were noted.  No arrhythmias were present.  This is unchanged from the patient's previous EKG on 9/15/2021.  Advised the patient to monitor for  worsening signs and/or symptoms.  Instructed the patient to avoid caffeine and stimulants, which may elevate his heart rate.  Recommend the patient follow-up with cardiology as scheduled.  Provided the patient with the number to schedule an appointment with primary care.  Discussed strict ED precautions with the patient, and he verbalized understanding.    Differential diagnoses, supportive care, and indications for immediate follow-up discussed with patient.   Instructed to return to clinic or nearest emergency department for any change in condition, further concerns, or worsening of symptoms.       Follow-up with cardiology as scheduled  Monitor for worsening signs and/or symptoms  Avoid caffeine and/or stimulants  Follow-up with primary care as needed  Work note provided  Return to clinic or to the ED if symptoms worsen or fail to improve, or if the patient should develop worsening/increasing/persistent elevated heart rate, palpitations, irregular heartbeat, chest pain, shortness of breath, lower extremity edema, cough, congestion, ear pain, sore throat, headache, dizziness, numbness, tingling, weakness of the extremities, fever/chills, and/or any concerning symptoms.    Discussed plan with the patient, and he agrees to the above.    I personally reviewed prior external notes and test results pertinent to today's visit.  I have independently reviewed and interpreted all diagnostics ordered during this urgent care visit.     Time spent evaluating this patient was at least 30 minutes and includes preparing for visit, obtaining history, exam and evaluation, ordering labs/tests/procedures/medications, independent interpretation, and counseling/education.    Please note that this dictation was created using voice recognition software. I have made every reasonable attempt to correct obvious errors, but I expect that there may be errors of grammar and possibly content that I did not discover before finalizing the note.      This note was electronically signed by Tawnya Hdz PA-C

## 2021-09-24 ENCOUNTER — OFFICE VISIT (OUTPATIENT)
Dept: MEDICAL GROUP | Facility: LAB | Age: 36
End: 2021-09-24
Payer: COMMERCIAL

## 2021-09-24 VITALS
OXYGEN SATURATION: 95 % | BODY MASS INDEX: 28.04 KG/M2 | DIASTOLIC BLOOD PRESSURE: 78 MMHG | WEIGHT: 185 LBS | RESPIRATION RATE: 12 BRPM | HEIGHT: 68 IN | SYSTOLIC BLOOD PRESSURE: 122 MMHG | HEART RATE: 119 BPM | TEMPERATURE: 98.4 F

## 2021-09-24 DIAGNOSIS — G43.009 MIGRAINE WITHOUT AURA AND WITHOUT STATUS MIGRAINOSUS, NOT INTRACTABLE: ICD-10-CM

## 2021-09-24 DIAGNOSIS — I45.81 PROLONGED QT SYNDROME: ICD-10-CM

## 2021-09-24 DIAGNOSIS — K21.9 GASTROESOPHAGEAL REFLUX DISEASE WITHOUT ESOPHAGITIS: ICD-10-CM

## 2021-09-24 DIAGNOSIS — R00.0 TACHYCARDIA: ICD-10-CM

## 2021-09-24 PROCEDURE — 99214 OFFICE O/P EST MOD 30 MIN: CPT | Performed by: FAMILY MEDICINE

## 2021-09-24 RX ORDER — PROPRANOLOL HYDROCHLORIDE 10 MG/1
10 TABLET ORAL 3 TIMES DAILY
Qty: 90 TABLET | Refills: 11 | Status: SHIPPED | OUTPATIENT
Start: 2021-09-24 | End: 2022-07-10

## 2021-09-24 ASSESSMENT — FIBROSIS 4 INDEX: FIB4 SCORE: 0.5

## 2021-09-24 NOTE — ASSESSMENT & PLAN NOTE
Years now. Some vomiting at times, vision changes. Light sensitive. Uses excedrin. Ibuprofen not helping .  Excedrin helped a lot, migraine gone in 30 minutes. Headache free for days/weeks after use  1-2 times per week at least.   No previous prevention meds. No known family history of migraines. Pain area moves around, behind eyes a lot, but also temporal, posterior as well.     If not treated migraines can last throughout the day into the next day, lessening in severity.

## 2021-09-24 NOTE — ASSESSMENT & PLAN NOTE
Noted on previous EKG a corrected QT interval of 460 at times.  He denies any known history of prolonged QT interval in the family or in his past.

## 2021-09-24 NOTE — LETTER
September 24, 2021        Олег Ramirez should be excused for work missed due to illness, and also allowed to return to work with the following restrictions.     Avoid lifting over 10 lbs.   Restrict work to [employer] driving.   Avoid heavy pushing and pulling as well over 10 lbs.                    Jessy Mandujano M.D.

## 2021-09-24 NOTE — ASSESSMENT & PLAN NOTE
Has been for years. Family history as well. Worried about use of excedrin due to caffeine.   Has had a 48 hour monitor.   Has appt on Oct 14th.   Some SOB, but no pain .

## 2021-09-24 NOTE — ASSESSMENT & PLAN NOTE
Quit smoking and vaping. This has gotten better with cessation. Taking prilosec daily. No more burning, but does feel like he is bringing up bile, puke. If not taking the prilosec everything will be on fire.   Was on nexium for awhile previously.   years ago had an EGD. He reports they saw inflammation.   Does eat acidic foods, carbonated drinks. Trying to avoid mtn dew and pepsi now.   No energy drinks.   Lots of carbs and italian foods.

## 2021-09-24 NOTE — PROGRESS NOTES
Chief Complaint   Patient presents with   • New Patient     heart burn         Олег Ramirez is a 35 y.o. male here to establish care and for evaluation and management of:        HPI:      Migraine without aura  Years now. Some vomiting at times, vision changes. Light sensitive. Uses excedrin. Ibuprofen not helping .  Excedrin helped a lot, migraine gone in 30 minutes. Headache free for days/weeks after use  1-2 times per week at least.   No previous prevention meds. No known family history of migraines. Pain area moves around, behind eyes a lot, but also temporal, posterior as well.     If not treated migraines can last throughout the day into the next day, lessening in severity.     Tachycardia  Has been for years. Family history as well. Worried about use of excedrin due to caffeine.   Has had a 48 hour monitor.   Has appt on Oct 14th.   Some SOB, but no pain .     Gastroesophageal reflux disease without esophagitis  Quit smoking and vaping. This has gotten better with cessation. Taking prilosec daily. No more burning, but does feel like he is bringing up bile, puke. If not taking the prilosec everything will be on fire.   Was on nexium for awhile previously.   years ago had an EGD. He reports they saw inflammation.   Does eat acidic foods, carbonated drinks. Trying to avoid mtn dew and pepsi now.   No energy drinks.   Lots of carbs and italian foods.     Prolonged QT syndrome  Noted on previous EKG a corrected QT interval of 460 at times.  He denies any known history of prolonged QT interval in the family or in his past.      No Known Allergies    Current medicines (including changes today)  Current Outpatient Medications   Medication Sig Dispense Refill   • asa/apap/caffeine (EXCEDRIN) 250-250-65 MG Tab Take 1 Tablet by mouth every 6 hours as needed.     • propranolol (INDERAL) 10 MG Tab Take 1 Tablet by mouth 3 times a day. 90 Tablet 11   • omeprazole (PRILOSEC) 40 MG delayed-release capsule Take 1  "Capsule by mouth every day for 30 days. 30 Capsule 0     No current facility-administered medications for this visit.     He  has a past medical history of GERD (gastroesophageal reflux disease).  He  has a past surgical history that includes appendectomy laparoscopic (2018) and appendectomy.  Social History     Tobacco Use   • Smoking status: Former Smoker     Types: Cigarettes     Quit date: 3/23/2021     Years since quittin.5   • Smokeless tobacco: Never Used   • Tobacco comment: 5 cigs a day, pack a week previously.    Vaping Use   • Vaping Use: Former   • Substances: Nicotine   Substance Use Topics   • Alcohol use: Not Currently     Comment: occasionally   • Drug use: No     Social History     Social History Narrative   • Not on file     Family History   Problem Relation Age of Onset   • Cancer Paternal Grandfather         esophageal     Family Status   Relation Name Status   • PGFa  (Not Specified)   • MAunt  Other        cardiac stent         ROS  No fever or chills.  No nausea or vomiting.    No cough or SOB.  No pain with urination or hematuria.  No black or bloody stools.  All other systems reviewed and are negative     Objective:     /78 (BP Location: Right arm, Patient Position: Sitting, BP Cuff Size: Adult)   Pulse (!) 119   Temp 36.9 °C (98.4 °F)   Resp 12   Ht 1.727 m (5' 8\")   Wt 83.9 kg (185 lb)   SpO2 95%  Body mass index is 28.13 kg/m².  Physical Exam:      Well developed, well nourished.  Alert, oriented in no acute distress.  Psych: Eye contact is good, speech goal directed, affect calm  Eyes: conjunctiva non-injected, sclera non-icteric.  Ears: Pinna normal. TM pearly gray.   Nose: Nares are patent.  Normal mucosa  Mouth: Oral mucous membranes pink and moist with no lesions.  Neck Supple.  No adenopathy or masses in the neck or supraclavicular regions. No thyromegaly  Lungs: clear to auscultation bilaterally with good excursion. No wheezes or rhonchi  CV: regular rate and " rhythm. No murmur.  Tachycardia which does slow with slow controlled expiration but at rest and with normal respirations otherwise speeds back up.  Abdomen: soft, nontender, no masses or organomegaly.  No rebound or gaurding  Ext: no edema, color normal, vascularity normal, temperature normal      Assessment and Plan:   The following treatment plan was discussed    1. Migraine without aura and without status migrainosus, not intractable  Discussed migraine prevention due to the fact that he has 14-15 migraine and or headache days per month.  Because he is not tried anything in the past he likely will have to start with some of the older agents before moving to some of the newer ones if he fails those.  We will try some propranolol at a low dose 3 times daily to see if we get migraine prevention but also tachycardia control as well.  He does have a follow-up coming up with cardiology and so I will leave them the option to change his medication if they so desired.    2. Tachycardia  Discussed using propranolol to help slow his heart rate at this time.  This should not pose a problem with prolonged QT syndrome and he does not appear to be on any other medications which would make this worse.  Particularly he should avoid any kind of psychiatric medications because of this as well until he visits with cardiology to decide if this is a concern or not.    3. Gastroesophageal reflux disease without esophagitis  Discussed diet changes in general.  He should avoid any kind of citrus or acid based food particularly red sauce and tomato products, but also caffeine, nicotine, carbonation and heavy carb meals.  This does seem to be the majority of his diet as he states that he is [place] and so he eats a lot of these foods in general.  Can try some ginger chews or ginger lozenges as needed for digestion and any nausea that presents but I would avoid ginger ale due to the carbonation factor.  He is also referred to GI for repeat  EGD to ensure that he is not having further progression of damage and Jones's-like changes.  - REFERRAL TO GASTROENTEROLOGY    4. Prolonged QT syndrome  Cardiology visit scheduled for October 14.  He should really try to keep this and we will catch up after that to discuss how he is doing with medications and neck steps.      Records requested.        Followup: Return in about 4 weeks (around 10/22/2021) for follow up meds.

## 2021-09-26 ENCOUNTER — PATIENT MESSAGE (OUTPATIENT)
Dept: MEDICAL GROUP | Facility: LAB | Age: 36
End: 2021-09-26

## 2021-09-27 ENCOUNTER — PATIENT MESSAGE (OUTPATIENT)
Dept: MEDICAL GROUP | Facility: LAB | Age: 36
End: 2021-09-27

## 2021-09-28 ENCOUNTER — TELEPHONE (OUTPATIENT)
Dept: MEDICAL GROUP | Facility: LAB | Age: 36
End: 2021-09-28

## 2021-09-28 NOTE — TELEPHONE ENCOUNTER
"· [place] paperwork received from [facility] requiring provider signature.     · All appropriate fields completed by Medical Assistant: N/A CMA printed and distributed to MA    · Paperwork placed in \"MA to Provider\" folder/basket. Awaiting provider completion/signature.  "

## 2021-09-30 ENCOUNTER — TELEPHONE (OUTPATIENT)
Dept: MEDICAL GROUP | Facility: LAB | Age: 36
End: 2021-09-30

## 2021-09-30 ENCOUNTER — PATIENT MESSAGE (OUTPATIENT)
Dept: MEDICAL GROUP | Facility: LAB | Age: 36
End: 2021-09-30

## 2021-09-30 NOTE — LETTER
October 12, 2021          Олег Ramirez should be excused for work missed due to illness, and also allowed to return to work with the following restrictions.      Avoid lifting over 10 lbs.   Restrict work to [employer] driving.   Avoid heavy pushing and pulling as well over 10 lbs.                              Jessy Mandujano M.D.

## 2021-09-30 NOTE — TELEPHONE ENCOUNTER
Pt. AGUILAR requesting a work note.  No date was given on voice mail  Will call or my chart message him.

## 2021-09-30 NOTE — LETTER
October 5, 2021         Patient: Олег Ramirez   YOB: 1985               To Whom it May Concern:    Please excuse Олег Ramirez from work October 3, 2021 - October 4, 2021 due to illness.     If you have any questions or concerns, please don't hesitate to call [phone]        Sincerely,           [place].   Electronically Signed

## 2021-10-08 ENCOUNTER — TELEPHONE (OUTPATIENT)
Dept: MEDICAL GROUP | Facility: LAB | Age: 36
End: 2021-10-08

## 2021-10-08 NOTE — TELEPHONE ENCOUNTER
"· Disability paperwork received from [employer] requiring provider signature.     · All appropriate fields completed by Medical Assistant: N/A CMA printed and distributed to MA    · Paperwork placed in \"MA to Provider\" folder/basket. Awaiting provider completion/signature.  " Statement Selected

## 2021-10-12 ENCOUNTER — TELEPHONE (OUTPATIENT)
Dept: MEDICAL GROUP | Facility: LAB | Age: 36
End: 2021-10-12

## 2021-10-12 NOTE — TELEPHONE ENCOUNTER
Pt. LVM requesting work note be redone to the 6th not the 5th  Also he want to know if he able to work full duty no restrictions. He is going back to work today.  Employer is putting him back to work with no restrictions.

## 2021-10-14 ENCOUNTER — OFFICE VISIT (OUTPATIENT)
Dept: CARDIOLOGY | Facility: MEDICAL CENTER | Age: 36
End: 2021-10-14
Payer: COMMERCIAL

## 2021-10-14 VITALS
WEIGHT: 189 LBS | BODY MASS INDEX: 28.64 KG/M2 | HEIGHT: 68 IN | SYSTOLIC BLOOD PRESSURE: 120 MMHG | OXYGEN SATURATION: 97 % | DIASTOLIC BLOOD PRESSURE: 88 MMHG | HEART RATE: 102 BPM

## 2021-10-14 DIAGNOSIS — I49.1 PREMATURE ATRIAL COMPLEX: ICD-10-CM

## 2021-10-14 DIAGNOSIS — R00.2 PALPITATIONS: ICD-10-CM

## 2021-10-14 DIAGNOSIS — R00.0 TACHYCARDIA: ICD-10-CM

## 2021-10-14 LAB — EKG IMPRESSION: NORMAL

## 2021-10-14 PROCEDURE — 99214 OFFICE O/P EST MOD 30 MIN: CPT | Performed by: INTERNAL MEDICINE

## 2021-10-14 PROCEDURE — 93000 ELECTROCARDIOGRAM COMPLETE: CPT | Performed by: INTERNAL MEDICINE

## 2021-10-14 ASSESSMENT — ENCOUNTER SYMPTOMS
CHILLS: 0
HEADACHES: 0
FEVER: 0
SHORTNESS OF BREATH: 0
EYE PAIN: 0
WEIGHT LOSS: 0
BRUISES/BLEEDS EASILY: 0
VOMITING: 0
DOUBLE VISION: 0
DIZZINESS: 0
PALPITATIONS: 1
ORTHOPNEA: 0
SPEECH CHANGE: 0
EYE DISCHARGE: 0
SENSORY CHANGE: 0
LOSS OF CONSCIOUSNESS: 0
BLOOD IN STOOL: 0
BLURRED VISION: 0
MYALGIAS: 0
PND: 0
CLAUDICATION: 0
FALLS: 0
NAUSEA: 0
COUGH: 0
ABDOMINAL PAIN: 0
HALLUCINATIONS: 0
DEPRESSION: 0

## 2021-10-14 ASSESSMENT — FIBROSIS 4 INDEX: FIB4 SCORE: 0.5

## 2021-10-14 NOTE — PROGRESS NOTES
Chief Complaint   Patient presents with   • Palpitations   • Tachycardia   • Syncope       Subjective     Олег Ramirez is a 35 y.o. male who presents today for cardiac care and evaluation of palpitations. Palpitation is sporadic. No specific worsening symptoms or precipitating symptoms. Patient feels like his heart is being pulled down. No family history of sudden cardiac death. No presyncope or syncope associated with his palpitations.    Patient was seen by Dr. Marino in the past (2020) with relatively uneventful Holter monitor.    No family history of sudden cardiac death.    Past Medical History:   Diagnosis Date   • GERD (gastroesophageal reflux disease)      Past Surgical History:   Procedure Laterality Date   • APPENDECTOMY LAPAROSCOPIC  2018    Procedure: APPENDECTOMY LAPAROSCOPIC;  Surgeon: Baljit Mondragon M.D.;  Location: SURGERY [facility];  Service: General   • APPENDECTOMY       Family History   Problem Relation Age of Onset   • Cancer Paternal Grandfather         esophageal     Social History     Socioeconomic History   • Marital status: Single     Spouse name: Not on file   • Number of children: Not on file   • Years of education: Not on file   • Highest education level: Not on file   Occupational History   • Not on file   Tobacco Use   • Smoking status: Former Smoker     Types: Cigarettes     Quit date: 3/23/2021     Years since quittin.5   • Smokeless tobacco: Never Used   • Tobacco comment: 5 cigs a day, pack a week previously.    Vaping Use   • Vaping Use: Former   • Substances: Nicotine   Substance and Sexual Activity   • Alcohol use: Not Currently     Comment: occasionally   • Drug use: No   • Sexual activity: Yes     Partners: Female     Birth control/protection: Condom   Other Topics Concern   • Not on file   Social History Narrative   • Not on file     Social Determinants of Health     Financial Resource Strain:    • Difficulty of Paying Living Expenses:    Food  Insecurity:    • Worried About Running Out of Food in the Last Year:    • Ran Out of Food in the Last Year:    Transportation Needs:    • Lack of Transportation (Medical):    • Lack of Transportation (Non-Medical):    Physical Activity:    • Days of Exercise per Week:    • Minutes of Exercise per Session:    Stress:    • Feeling of Stress :    Social Connections:    • Frequency of Communication with Friends and Family:    • Frequency of Social Gatherings with Friends and Family:    • Attends Shinto Services:    • Active Member of Clubs or Organizations:    • Attends Club or Organization Meetings:    • Marital Status:    Intimate Partner Violence:    • Fear of Current or Ex-Partner:    • Emotionally Abused:    • Physically Abused:    • Sexually Abused:      No Known Allergies  Outpatient Encounter Medications as of 10/14/2021   Medication Sig Dispense Refill   • asa/apap/caffeine (EXCEDRIN) 250-250-65 MG Tab Take 1 Tablet by mouth every 6 hours as needed.     • propranolol (INDERAL) 10 MG Tab Take 1 Tablet by mouth 3 times a day. 90 Tablet 11   • omeprazole (PRILOSEC) 40 MG delayed-release capsule Take 1 Capsule by mouth every day for 30 days. 30 Capsule 0     No facility-administered encounter medications on file as of 10/14/2021.     Review of Systems   Constitutional: Negative for chills, fever, malaise/fatigue and weight loss.   HENT: Negative for ear discharge, ear pain, hearing loss and nosebleeds.    Eyes: Negative for blurred vision, double vision, pain and discharge.   Respiratory: Negative for cough and shortness of breath.    Cardiovascular: Positive for palpitations. Negative for chest pain, orthopnea, claudication, leg swelling and PND.   Gastrointestinal: Negative for abdominal pain, blood in stool, melena, nausea and vomiting.   Genitourinary: Negative for dysuria and hematuria.   Musculoskeletal: Negative for falls, joint pain and myalgias.   Skin: Negative for itching and rash.   Neurological:  "Negative for dizziness, sensory change, speech change, loss of consciousness and headaches.   Endo/Heme/Allergies: Negative for environmental allergies. Does not bruise/bleed easily.   Psychiatric/Behavioral: Negative for depression, hallucinations and suicidal ideas.       Objective     /88 (BP Location: Right arm, Patient Position: Sitting, BP Cuff Size: Adult)   Pulse (!) 102   Ht 1.727 m (5' 8\")   Wt 85.7 kg (189 lb)   SpO2 97%   BMI 28.74 kg/m²     Physical Exam  Vitals and nursing note reviewed.   Constitutional:       General: He is not in acute distress.     Appearance: He is not diaphoretic.   HENT:      Head: Normocephalic and atraumatic.      Right Ear: External ear normal.      Left Ear: External ear normal.      Nose: No congestion or rhinorrhea.   Eyes:      General:         Right eye: No discharge.         Left eye: No discharge.   Neck:      Thyroid: No thyromegaly.      Vascular: No JVD.   Cardiovascular:      Rate and Rhythm: Normal rate and regular rhythm.      Pulses: Normal pulses.   Pulmonary:      Effort: No respiratory distress.   Abdominal:      General: There is no distension.      Tenderness: There is no abdominal tenderness.   Musculoskeletal:         General: No swelling or tenderness.      Right lower leg: No edema.      Left lower leg: No edema.   Skin:     General: Skin is warm and dry.   Neurological:      Mental Status: He is alert and oriented to person, place, and time.      Cranial Nerves: No cranial nerve deficit.   Psychiatric:         Behavior: Behavior normal.       Assessment & Plan     1. Palpitations  EC-ECHOCARDIOGRAM COMPLETE W/O CONT   2. Tachycardia  EC-ECHOCARDIOGRAM COMPLETE W/O CONT   3. Premature atrial complex  EC-ECHOCARDIOGRAM COMPLETE W/O CONT       Medical Decision Making: Today's Assessment/Status/Plan:   I will order transthoracic echocardiogram to assess for structural abnormalities.  Patient prefers no medical therapy at this time.  Palpitations " are likely related to PACs.  I Advised patient on reduction of ETOH intake along with adequate electrolytes hydration.  Optimize exercise program.  Consider medical therapy in future if symptoms not improved. Trial of non-medical regimen first to avoid medical side effects.  I will also obtain home long-term event monitoring with [employer].

## 2021-10-15 ENCOUNTER — HOSPITAL ENCOUNTER (OUTPATIENT)
Dept: CARDIOLOGY | Facility: MEDICAL CENTER | Age: 36
End: 2021-10-15
Attending: INTERNAL MEDICINE
Payer: COMMERCIAL

## 2021-10-15 DIAGNOSIS — R00.2 PALPITATIONS: ICD-10-CM

## 2021-10-15 DIAGNOSIS — I49.1 PREMATURE ATRIAL COMPLEX: ICD-10-CM

## 2021-10-15 DIAGNOSIS — R00.0 TACHYCARDIA: ICD-10-CM

## 2021-10-15 PROCEDURE — 93306 TTE W/DOPPLER COMPLETE: CPT

## 2021-10-18 ENCOUNTER — TELEPHONE (OUTPATIENT)
Dept: CARDIOLOGY | Facility: MEDICAL CENTER | Age: 36
End: 2021-10-18

## 2021-10-18 LAB
LV EJECT FRACT MOD 2C 99903: 51.09
LV EJECT FRACT MOD 4C 99902: 48.46
LV EJECT FRACT MOD BP 99901: 48.33

## 2021-10-18 PROCEDURE — 93306 TTE W/DOPPLER COMPLETE: CPT | Mod: 26 | Performed by: INTERNAL MEDICINE

## 2021-10-18 NOTE — RESULT ENCOUNTER NOTE
Dear Shauna,    Can you please let Олег Ramirez know that result is ok and I will see patient as scheduled?    Thank you,  Sergei.

## 2021-10-18 NOTE — TELEPHONE ENCOUNTER
Message  Received: Today  RUSSELL Azar R.N.  Dear Shauna,     Can you please let Олег Ramirez know that result is ok and I will see patient as scheduled?     Thank you,   Sergei.           EC-ECHOCARDIOGRAM COMPLETE W/O CONT      See MyChart message

## 2021-10-19 ENCOUNTER — OFFICE VISIT (OUTPATIENT)
Dept: MEDICAL GROUP | Facility: LAB | Age: 36
End: 2021-10-19
Payer: COMMERCIAL

## 2021-10-19 VITALS
BODY MASS INDEX: 27.43 KG/M2 | RESPIRATION RATE: 12 BRPM | WEIGHT: 181 LBS | OXYGEN SATURATION: 99 % | HEIGHT: 68 IN | TEMPERATURE: 97.5 F | SYSTOLIC BLOOD PRESSURE: 116 MMHG | HEART RATE: 100 BPM | DIASTOLIC BLOOD PRESSURE: 84 MMHG

## 2021-10-19 DIAGNOSIS — R00.2 PALPITATIONS: ICD-10-CM

## 2021-10-19 DIAGNOSIS — Z91.89 AT RISK FOR SLEEP APNEA: ICD-10-CM

## 2021-10-19 DIAGNOSIS — K21.9 GASTROESOPHAGEAL REFLUX DISEASE WITHOUT ESOPHAGITIS: ICD-10-CM

## 2021-10-19 PROCEDURE — 99214 OFFICE O/P EST MOD 30 MIN: CPT | Performed by: FAMILY MEDICINE

## 2021-10-19 RX ORDER — OMEPRAZOLE 40 MG/1
40 CAPSULE, DELAYED RELEASE ORAL DAILY
COMMUNITY
End: 2021-10-28 | Stop reason: SDUPTHER

## 2021-10-19 ASSESSMENT — FIBROSIS 4 INDEX: FIB4 SCORE: 0.5

## 2021-10-19 NOTE — LETTER
October 19, 2021        Олег Ramirez was seen in clinic today. Please excuse work missed due to this necessary appointment.                        Jessy Mandujano M.D.

## 2021-10-19 NOTE — PROGRESS NOTES
Subjective:     Chief Complaint   Patient presents with   • Other     headaches, sleep, chest pressure         HPI:   Олег presents today to follow up and discuss getting back to work. I placed him on light duty while undergoing cardiac workup. He has seen cardio for tachycardia and has some workup to complete yet, but also complains of still not sleeping well, and wanting to be off work again.  Echo was read as normal as well recently.   Pulse remains high, 100 today.   Does report continued stress and migraine headaches keeping him up. Excedrin helps, but he has to take it daily. Headaches come on more in the evenings.   Not sure if bothered by food. Excedrin kicks in in 20 mins, then will be headache free for the whole day until the next evening.   Does get up and go to work in the evenings. Does snore. Does get the sensation of being a bit short of breath when he wakes at times. Possible some witnessed apnea. Pretty consistent snoring nightly. GF does report pausing breathing, gasping for air on waking at times.   Does sleep talk and sleep walk as well. Has been for a long time, since a little kid.   Never treated with sleep meds in the past. Hard to get to sleep and stay asleep. Usually goes to sleep around 2 am. Waking then around 1 pm or so. Working from 3-11:30 pm is work. Does have overtime as well, mandatory.     Not taking propranolol due to erectile dysfunction concerns.     He has also been referred to GI for EGD and GERD eval. GI visit with GI consultants coming up. Taking omeprazole. This has been going well.   Still not smoking. Not smoking anything right now.     Has been back to work. Doing light duty. They are abiding by restrictions.     Current Outpatient Medications Ordered in Epic   Medication Sig Dispense Refill   • asa/apap/caffeine (EXCEDRIN) 250-250-65 MG Tab Take 1 Tablet by mouth every 6 hours as needed.     • propranolol (INDERAL) 10 MG Tab Take 1 Tablet by mouth 3 times a day. 90  "Tablet 11     No current [facility]-ordered facility-administered medications on file.         ROS:  Gen: no fevers/chills, no changes in weight  Eyes: no changes in vision  ENT: no sore throat, no hearing loss, no bloody nose  Pulm: no sob, no cough  CV: no chest pain, no palpitations  GI: no nausea/vomiting, no diarrhea  : no dysuria  MSk: no myalgias  Skin: no rash        Objective:     Exam:  /84 (BP Location: Right arm, Patient Position: Sitting, BP Cuff Size: Adult)   Pulse 100   Temp 36.4 °C (97.5 °F)   Resp 12   Ht 1.727 m (5' 8\")   Wt 82.1 kg (181 lb)   SpO2 99%   BMI 27.52 kg/m²  Body mass index is 27.52 kg/m².    Gen: AAOx3, NAD, well appearing  HEENT: NCAT, EOMI, Nares patent, Mucosa moist.  Dentition is fair, Mallampati 4.  Neck: Circumference slightly over 17 inches.  Resp: Normal chest wall rise and fall, not SOB, no tachypnea  Skin: no rash or abnormality of visible skin.   Psych: normal speech, not slurred, good insight, affect full  MSK: Moves all four limbs equally and normally, gait normal        Assessment & Plan:     35 y.o. male with the following -   1. At risk for sleep apnea  We will try to get a home sleep study going for him as this might help elucidate better sleep treatment.  [place] sleep scale is fairly low score but STOP-BANG sleep score is 5.  - Overnight Home Sleep Study; Future    2. Palpitations  Discussed commonality of palpitations and possibly PVCs versus PACs as cardiology had postulated.  Discussed the benign nature of these but that they can be somewhat concerning or scary at times.  Because of side effects he stopped his propranolol which is fine.  His pulse at this point is lower than it had been so I think we can stay off of this for now.  I do would encourage him to call cardiology and get the monitoring going as well.    3. Gastroesophageal reflux disease without esophagitis  Does report on the higher dose of the omeprazole his reflux does seem to be much " better although he has noticed that he at times will have some swallowing issues.  Recommend he phone GI consultants for his appointment to get this done as soon as possible.  Continue omeprazole for now.    Discussed contacting work and discussing with them options for disability versus leave.  I do not think that necessarily just giving him time off will fix the issue unless this time off encompasses his doctor visits and studies.  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  If he is agreeable she can email me so that we can try to navigate this as well.              No follow-ups on file.    Please note that this dictation was created using voice recognition software. I have made every reasonable attempt to correct obvious errors, but I expect that there are errors of grammar and possibly content that I did not discover before finalizing the note.

## 2021-10-22 ENCOUNTER — NON-PROVIDER VISIT (OUTPATIENT)
Dept: CARDIOLOGY | Facility: MEDICAL CENTER | Age: 36
End: 2021-10-22
Payer: COMMERCIAL

## 2021-10-22 ENCOUNTER — TELEPHONE (OUTPATIENT)
Dept: CARDIOLOGY | Facility: MEDICAL CENTER | Age: 36
End: 2021-10-22

## 2021-10-22 DIAGNOSIS — I49.1 PREMATURE ATRIAL CONTRACTIONS: ICD-10-CM

## 2021-10-22 DIAGNOSIS — R00.2 PALPITATIONS: ICD-10-CM

## 2021-10-22 NOTE — TELEPHONE ENCOUNTER
Home enrollment completed for the 30 day [employer] Heart monitoring program per Graciela Ferreira MD.  Monitor to be shipped to patient by [employer].  >Pending Baseline.  >Pending EOS.

## 2021-10-25 ENCOUNTER — TELEPHONE (OUTPATIENT)
Dept: MEDICAL GROUP | Facility: LAB | Age: 36
End: 2021-10-25

## 2021-10-25 NOTE — TELEPHONE ENCOUNTER
"· Disability paperwork received from [employer] requiring provider signature.     · All appropriate fields completed by Medical Assistant: N/A CMA printed and distributed to MA    · Paperwork placed in \"MA to Provider\" folder/basket. Awaiting provider completion/signature.  "

## 2021-10-26 ENCOUNTER — APPOINTMENT (OUTPATIENT)
Dept: CARDIOLOGY | Facility: MEDICAL CENTER | Age: 36
End: 2021-10-26
Attending: INTERNAL MEDICINE
Payer: COMMERCIAL

## 2021-10-26 DIAGNOSIS — R00.2 PALPITATIONS: ICD-10-CM

## 2021-10-26 DIAGNOSIS — R00.0 TACHYCARDIA: ICD-10-CM

## 2021-10-26 DIAGNOSIS — I49.1 PREMATURE ATRIAL COMPLEX: ICD-10-CM

## 2021-10-28 ENCOUNTER — OFFICE VISIT (OUTPATIENT)
Dept: MEDICAL GROUP | Facility: LAB | Age: 36
End: 2021-10-28
Payer: COMMERCIAL

## 2021-10-28 ENCOUNTER — PATIENT MESSAGE (OUTPATIENT)
Dept: MEDICAL GROUP | Facility: LAB | Age: 36
End: 2021-10-28

## 2021-10-28 VITALS
SYSTOLIC BLOOD PRESSURE: 120 MMHG | RESPIRATION RATE: 12 BRPM | BODY MASS INDEX: 27.74 KG/M2 | TEMPERATURE: 98 F | WEIGHT: 183 LBS | OXYGEN SATURATION: 100 % | HEIGHT: 68 IN | HEART RATE: 112 BPM | DIASTOLIC BLOOD PRESSURE: 80 MMHG

## 2021-10-28 DIAGNOSIS — K21.9 GASTROESOPHAGEAL REFLUX DISEASE WITHOUT ESOPHAGITIS: ICD-10-CM

## 2021-10-28 DIAGNOSIS — G43.C0 PERIODIC HEADACHE SYNDROME, NOT INTRACTABLE: ICD-10-CM

## 2021-10-28 DIAGNOSIS — F39 MOOD DISORDER (HCC): ICD-10-CM

## 2021-10-28 DIAGNOSIS — R00.2 PALPITATIONS: ICD-10-CM

## 2021-10-28 DIAGNOSIS — R00.0 TACHYCARDIA: ICD-10-CM

## 2021-10-28 PROCEDURE — 99214 OFFICE O/P EST MOD 30 MIN: CPT | Performed by: FAMILY MEDICINE

## 2021-10-28 RX ORDER — OMEPRAZOLE 40 MG/1
40 CAPSULE, DELAYED RELEASE ORAL DAILY
Qty: 90 CAPSULE | Refills: 3 | Status: SHIPPED | OUTPATIENT
Start: 2021-10-28 | End: 2022-07-10

## 2021-10-28 ASSESSMENT — FIBROSIS 4 INDEX: FIB4 SCORE: 0.51

## 2021-10-28 NOTE — PROGRESS NOTES
"Subjective:     Chief Complaint   Patient presents with   • Paperwork     chest muscle twitch x 2 day          HPI:   Олег presents today with concerns regarding heart palpitations and FMLA paperwork.   Does have GI appt reschedule 11/11/21.     Still hasnt gotten his long term heart monitor put on yet. Should be this week or early next.     When asked about the possibility for anxiety or mood disorder around the time this all started he does relate some stressors and personal life, work life, family life, about a year ago when this all started.  He still remains concerned about chest issues and heart pains and heart rate.  Reports he has a friend with anxiety and that his symptoms seem very different than tremors.    Current Outpatient Medications Ordered in Epic   Medication Sig Dispense Refill   • omeprazole (PRILOSEC) 40 MG delayed-release capsule Take 40 mg by mouth every day.     • asa/apap/caffeine (EXCEDRIN) 250-250-65 MG Tab Take 1 Tablet by mouth every 6 hours as needed.     • propranolol (INDERAL) 10 MG Tab Take 1 Tablet by mouth 3 times a day. 90 Tablet 11     No current [facility]-ordered facility-administered medications on file.         ROS:  Gen: no fevers/chills, no changes in weight  GI: no nausea/vomiting, no diarrhea  : no dysuria  MSk: no myalgias  Skin: no rash  Neuro: no headaches, no numbness/tingling  Heme/Lymph: no easy bruising      Objective:     Exam:  /80 (BP Location: Right arm, Patient Position: Sitting, BP Cuff Size: Adult)   Pulse (!) 112   Temp 36.7 °C (98 °F)   Resp 12   Ht 1.727 m (5' 8\")   Wt 83 kg (183 lb)   SpO2 100%   BMI 27.83 kg/m²  Body mass index is 27.83 kg/m².    Gen: Alert and oriented, No apparent distress.  HEENT: NCAT, EOMI, oropharynx is clear without erythematous or exudates.  TMs are normal bilaterally.  Left canal with tympanostomy tube stuck in a large chunk of wax.  I am unable to clear this today.  Neck: Neck is supple without " lymphadenopathy.  Lungs: Normal effort, CTA bilaterally, no wheezes, rhonchi, or rales  CV: Regular rate and rhythm. No murmurs, rubs, or gallops.  Ext: No clubbing, cyanosis, edema.      Assessment & Plan:     36 y.o. male with the following -     1. Mood disorder (HCC)  We spent a good amount of time discussing possibility for anxiety or depression.  It certainly does seem that this might be a contributing factor in a lot of his symptoms.  Certainly we need to continue his work-up with GI and cardiology to ensure that there is no other organic cause but he has agreed to meet with psychology to have a discussion.  - REFERRAL TO BEHAVIORAL HEALTH    2. Tachycardia  He will let me know how he is feeling with regard to going back to work and we can fill out paperwork with regard to this later today.  Currently has been off work since the 20th related to medical disability.  Should get his heart monitor today or tomorrow.    3. Gastroesophageal reflux disease without esophagitis  Rescheduled with GI November 11.    4. Palpitations            No follow-ups on file.    Please note that this dictation was created using voice recognition software. I have made every reasonable attempt to correct obvious errors, but I expect that there are errors of grammar and possibly content that I did not discover before finalizing the note.

## 2021-11-15 RX ORDER — SUMATRIPTAN 50 MG/1
50 TABLET, FILM COATED ORAL
Qty: 10 TABLET | Refills: 3 | Status: SHIPPED | OUTPATIENT
Start: 2021-11-15 | End: 2022-07-10

## 2021-11-15 RX ORDER — PROMETHAZINE HYDROCHLORIDE 25 MG/1
12.5 TABLET ORAL EVERY 8 HOURS PRN
Qty: 15 TABLET | Refills: 0 | Status: SHIPPED | OUTPATIENT
Start: 2021-11-15 | End: 2021-11-20

## 2021-11-18 ENCOUNTER — PATIENT MESSAGE (OUTPATIENT)
Dept: MEDICAL GROUP | Facility: LAB | Age: 36
End: 2021-11-18

## 2021-11-23 ENCOUNTER — PATIENT MESSAGE (OUTPATIENT)
Dept: MEDICAL GROUP | Facility: LAB | Age: 36
End: 2021-11-23

## 2021-11-23 ENCOUNTER — HOSPITAL ENCOUNTER (EMERGENCY)
Facility: MEDICAL CENTER | Age: 36
End: 2021-11-23
Attending: EMERGENCY MEDICINE
Payer: COMMERCIAL

## 2021-11-23 VITALS
WEIGHT: 185 LBS | HEART RATE: 105 BPM | BODY MASS INDEX: 28.13 KG/M2 | OXYGEN SATURATION: 98 % | RESPIRATION RATE: 18 BRPM | SYSTOLIC BLOOD PRESSURE: 145 MMHG | TEMPERATURE: 98.9 F | DIASTOLIC BLOOD PRESSURE: 93 MMHG

## 2021-11-23 DIAGNOSIS — R00.2 PALPITATIONS: ICD-10-CM

## 2021-11-23 LAB
ALBUMIN SERPL BCP-MCNC: 4.5 G/DL (ref 3.2–4.9)
ALBUMIN/GLOB SERPL: 1.9 G/DL
ALP SERPL-CCNC: 73 U/L (ref 30–99)
ALT SERPL-CCNC: 23 U/L (ref 2–50)
ANION GAP SERPL CALC-SCNC: 7 MMOL/L (ref 7–16)
AST SERPL-CCNC: 16 U/L (ref 12–45)
BASOPHILS # BLD AUTO: 1 % (ref 0–1.8)
BASOPHILS # BLD: 0.08 K/UL (ref 0–0.12)
BILIRUB SERPL-MCNC: 0.5 MG/DL (ref 0.1–1.5)
BUN SERPL-MCNC: 13 MG/DL (ref 8–22)
CALCIUM SERPL-MCNC: 9.6 MG/DL (ref 8.4–10.2)
CHLORIDE SERPL-SCNC: 109 MMOL/L (ref 96–112)
CO2 SERPL-SCNC: 24 MMOL/L (ref 20–33)
CREAT SERPL-MCNC: 1.1 MG/DL (ref 0.5–1.4)
EKG IMPRESSION: NORMAL
EOSINOPHIL # BLD AUTO: 0.47 K/UL (ref 0–0.51)
EOSINOPHIL NFR BLD: 5.7 % (ref 0–6.9)
ERYTHROCYTE [DISTWIDTH] IN BLOOD BY AUTOMATED COUNT: 42.1 FL (ref 35.9–50)
GLOBULIN SER CALC-MCNC: 2.4 G/DL (ref 1.9–3.5)
GLUCOSE SERPL-MCNC: 94 MG/DL (ref 65–99)
HCT VFR BLD AUTO: 48.8 % (ref 42–52)
HGB BLD-MCNC: 15.7 G/DL (ref 14–18)
IMM GRANULOCYTES # BLD AUTO: 0.02 K/UL (ref 0–0.11)
IMM GRANULOCYTES NFR BLD AUTO: 0.2 % (ref 0–0.9)
LYMPHOCYTES # BLD AUTO: 2.89 K/UL (ref 1–4.8)
LYMPHOCYTES NFR BLD: 35.2 % (ref 22–41)
MAGNESIUM SERPL-MCNC: 2 MG/DL (ref 1.5–2.5)
MCH RBC QN AUTO: 27 PG (ref 27–33)
MCHC RBC AUTO-ENTMCNC: 32.2 G/DL (ref 33.7–35.3)
MCV RBC AUTO: 83.8 FL (ref 81.4–97.8)
MONOCYTES # BLD AUTO: 0.98 K/UL (ref 0–0.85)
MONOCYTES NFR BLD AUTO: 12 % (ref 0–13.4)
NEUTROPHILS # BLD AUTO: 3.76 K/UL (ref 1.82–7.42)
NEUTROPHILS NFR BLD: 45.9 % (ref 44–72)
NRBC # BLD AUTO: 0 K/UL
NRBC BLD-RTO: 0 /100 WBC
PLATELET # BLD AUTO: 286 K/UL (ref 164–446)
PMV BLD AUTO: 10 FL (ref 9–12.9)
POTASSIUM SERPL-SCNC: 4.2 MMOL/L (ref 3.6–5.5)
PROT SERPL-MCNC: 6.9 G/DL (ref 6–8.2)
RBC # BLD AUTO: 5.82 M/UL (ref 4.7–6.1)
SODIUM SERPL-SCNC: 140 MMOL/L (ref 135–145)
TROPONIN T SERPL-MCNC: 6 NG/L (ref 6–19)
TSH SERPL DL<=0.005 MIU/L-ACNC: 1.15 UIU/ML (ref 0.38–5.33)
WBC # BLD AUTO: 8.2 K/UL (ref 4.8–10.8)

## 2021-11-23 PROCEDURE — 84484 ASSAY OF TROPONIN QUANT: CPT

## 2021-11-23 PROCEDURE — 93005 ELECTROCARDIOGRAM TRACING: CPT

## 2021-11-23 PROCEDURE — 80053 COMPREHEN METABOLIC PANEL: CPT

## 2021-11-23 PROCEDURE — 85025 COMPLETE CBC W/AUTO DIFF WBC: CPT

## 2021-11-23 PROCEDURE — 99283 EMERGENCY DEPT VISIT LOW MDM: CPT

## 2021-11-23 PROCEDURE — 93005 ELECTROCARDIOGRAM TRACING: CPT | Performed by: EMERGENCY MEDICINE

## 2021-11-23 PROCEDURE — 84443 ASSAY THYROID STIM HORMONE: CPT

## 2021-11-23 PROCEDURE — 83735 ASSAY OF MAGNESIUM: CPT

## 2021-11-23 PROCEDURE — 36415 COLL VENOUS BLD VENIPUNCTURE: CPT

## 2021-11-23 RX ORDER — LORAZEPAM 1 MG/1
1 TABLET ORAL EVERY 4 HOURS PRN
Qty: 15 TABLET | Refills: 0 | Status: SHIPPED | OUTPATIENT
Start: 2021-11-23 | End: 2021-11-28

## 2021-11-23 ASSESSMENT — FIBROSIS 4 INDEX: FIB4 SCORE: 0.4

## 2021-11-23 NOTE — ED PROVIDER NOTES
ED Provider Note    CHIEF COMPLAINT  Chief Complaint   Patient presents with   • Rapid Heart Beat       HPI  Олег Ramirez is a 36 y.o. male who presents for evaluation of rapid heartbeat palpitations.  The patient has a history of apparent rapid heartbeat.  He had been referred to cardiology and has a continuous monitor on.  He recently had an echocardiogram around 2 to 3 weeks ago which was normal.  He denies any syncope any no chest pain.  No report of any high fevers or chills.  He specifically denies strokelike symptoms such as numbness weakness tingling to the arms legs or face.  He has no other symptoms such as abdominal pain pain radiating to the back double vision fevers or chills.    REVIEW OF SYSTEMS  See HPI for further details.  No high fevers chills night sweats weight loss all other systems are negative.     PAST MEDICAL HISTORY  Past Medical History:   Diagnosis Date   • GERD (gastroesophageal reflux disease)        FAMILY HISTORY  Noncontributory    SOCIAL HISTORY  Social History     Socioeconomic History   • Marital status: Single     Spouse name: Not on file   • Number of children: Not on file   • Years of education: Not on file   • Highest education level: Not on file   Occupational History   • Not on file   Tobacco Use   • Smoking status: Former Smoker     Types: Cigarettes     Quit date: 3/23/2021     Years since quittin.6   • Smokeless tobacco: Never Used   • Tobacco comment: 5 cigs a day, pack a week previously.    Vaping Use   • Vaping Use: Former   • Substances: Nicotine   Substance and Sexual Activity   • Alcohol use: Not Currently     Comment: occasionally   • Drug use: No   • Sexual activity: Yes     Partners: Female     Birth control/protection: Condom   Other Topics Concern   • Not on file   Social History Narrative   • Not on file     Social Determinants of Health     Financial Resource Strain:    • Difficulty of Paying Living Expenses: Not on file   Food Insecurity:    •  Worried About Running Out of Food in the Last Year: Not on file   • Ran Out of Food in the Last Year: Not on file   Transportation Needs:    • Lack of Transportation (Medical): Not on file   • Lack of Transportation (Non-Medical): Not on file   Physical Activity:    • Days of Exercise per Week: Not on file   • Minutes of Exercise per Session: Not on file   Stress:    • Feeling of Stress : Not on file   Social Connections:    • Frequency of Communication with Friends and Family: Not on file   • Frequency of Social Gatherings with Friends and Family: Not on file   • Attends Congregational Services: Not on file   • Active Member of Clubs or Organizations: Not on file   • Attends Club or Organization Meetings: Not on file   • Marital Status: Not on file   Intimate Partner Violence:    • Fear of Current or Ex-Partner: Not on file   • Emotionally Abused: Not on file   • Physically Abused: Not on file   • Sexually Abused: Not on file   Housing Stability:    • Unable to Pay for Housing in the Last Year: Not on file   • Number of Places Lived in the Last Year: Not on file   • Unstable Housing in the Last Year: Not on file     Denies drug or alcohol abuse  SURGICAL HISTORY  Past Surgical History:   Procedure Laterality Date   • APPENDECTOMY LAPAROSCOPIC  9/14/2018    Procedure: APPENDECTOMY LAPAROSCOPIC;  Surgeon: Baljit Mondragon M.D.;  Location: SURGERY [facility];  Service: General   • APPENDECTOMY         CURRENT MEDICATIONS  Home Medications    **Home medications have not yet been reviewed for this encounter**         ALLERGIES  No Known Allergies    PHYSICAL EXAM  VITAL SIGNS: /92   Pulse 95   Temp 36.6 °C (97.8 °F) (Temporal)   Resp 16   SpO2 95%       Constitutional: Well developed, Well nourished, No acute distress, Non-toxic appearance.   HENT: Normocephalic, Atraumatic, Bilateral external ears normal, Oropharynx moist, No oral exudates, Nose normal.   Eyes: PERRLA, EOMI, Conjunctiva normal, No discharge.    Neck: Normal range of motion, No tenderness, Supple, No stridor.   Cardiovascular: Tachycardic, Normal rhythm, No murmurs, No rubs, No gallops.   Thorax & Lungs: Normal breath sounds, No respiratory distress, No wheezing, No chest tenderness.   Abdomen: Bowel sounds normal, Soft, No tenderness, No masses, No pulsatile masses.   Skin: Warm, Dry, No erythema, No rash.   Extremities: Intact distal pulses, No edema, No tenderness, No cyanosis, No clubbing.   Neurologic: Alert & oriented x 3, Normal motor function, Normal sensory function, No focal deficits noted.   Psychiatric: Anxious.     Results for orders placed or performed during the hospital encounter of 11/23/21   TSH WITH REFLEX TO FT4   Result Value Ref Range    TSH 1.150 0.380 - 5.330 uIU/mL   MAGNESIUM   Result Value Ref Range    Magnesium 2.0 1.5 - 2.5 mg/dL   CBC WITH DIFFERENTIAL   Result Value Ref Range    WBC 8.2 4.8 - 10.8 K/uL    RBC 5.82 4.70 - 6.10 M/uL    Hemoglobin 15.7 14.0 - 18.0 g/dL    Hematocrit 48.8 42.0 - 52.0 %    MCV 83.8 81.4 - 97.8 fL    MCH 27.0 27.0 - 33.0 pg    MCHC 32.2 (L) 33.7 - 35.3 g/dL    RDW 42.1 35.9 - 50.0 fL    Platelet Count 286 164 - 446 K/uL    MPV 10.0 9.0 - 12.9 fL    Neutrophils-Polys 45.90 44.00 - 72.00 %    Lymphocytes 35.20 22.00 - 41.00 %    Monocytes 12.00 0.00 - 13.40 %    Eosinophils 5.70 0.00 - 6.90 %    Basophils 1.00 0.00 - 1.80 %    Immature Granulocytes 0.20 0.00 - 0.90 %    Nucleated RBC 0.00 /100 WBC    Neutrophils (Absolute) 3.76 1.82 - 7.42 K/uL    Lymphs (Absolute) 2.89 1.00 - 4.80 K/uL    Monos (Absolute) 0.98 (H) 0.00 - 0.85 K/uL    Eos (Absolute) 0.47 0.00 - 0.51 K/uL    Baso (Absolute) 0.08 0.00 - 0.12 K/uL    Immature Granulocytes (abs) 0.02 0.00 - 0.11 K/uL    NRBC (Absolute) 0.00 K/uL   Comp Metabolic Panel   Result Value Ref Range    Sodium 140 135 - 145 mmol/L    Potassium 4.2 3.6 - 5.5 mmol/L    Chloride 109 96 - 112 mmol/L    Co2 24 20 - 33 mmol/L    Anion Gap 7.0 7.0 - 16.0    Glucose 94  65 - 99 mg/dL    Bun 13 8 - 22 mg/dL    Creatinine 1.10 0.50 - 1.40 mg/dL    Calcium 9.6 8.4 - 10.2 mg/dL    AST(SGOT) 16 12 - 45 U/L    ALT(SGPT) 23 2 - 50 U/L    Alkaline Phosphatase 73 30 - 99 U/L    Total Bilirubin 0.5 0.1 - 1.5 mg/dL    Albumin 4.5 3.2 - 4.9 g/dL    Total Protein 6.9 6.0 - 8.2 g/dL    Globulin 2.4 1.9 - 3.5 g/dL    A-G Ratio 1.9 g/dL   TROPONIN   Result Value Ref Range    Troponin T 6 6 - 19 ng/L   ESTIMATED GFR   Result Value Ref Range    GFR If African American >60 >60 mL/min/1.73 m 2    GFR If Non African American >60 >60 mL/min/1.73 m 2   EKG   Result Value Ref Range    Report       [facility] Emergency Dept.    Test Date:  2021  Pt Name:    TRE VIRK               Department: [facility]  MRN:        [id number]                      Room:  Gender:     Male                         Technician:   :        [id number]                   Requested By:ER TRIAGE PROTOCOL  Order #:    [id number]                    Reading MD:    Measurements  Intervals                                Axis  Rate:       106                          P:          38  VT:         128                          QRS:        20  QRSD:       100                          T:          21  QT:         344  QTc:        457    Interpretive Statements  SINUS TACHYCARDIA  Compared to ECG 10/14/2021 09:53:04  Sinus rhythm no longer present        COURSE & MEDICAL DECISION MAKING  Pertinent Labs & Imaging studies reviewed. (See chart for details)  An IV is established.  Reviewed the patient's recent visit history.  I reviewed his recent echocardiogram.  Here he has sinus tachycardia without suggestion of any tachydysrhythmia.  Viewed the patient's days including CBC metabolic panel and thyroid studies all of which are normal.  He was observed on telemetry here for 2 hours without any signs or symptoms of ectopy heart block etc.  He does have symptomatic palpitations.  I did offer short course of propranolol to  see if that can help with his symptoms and recommend following up with cardiology as discussed    FINAL IMPRESSION  1.  Palpitations         Electronically signed by: Diogo Argueta M.D., 11/23/2021 11:12 AM

## 2021-11-23 NOTE — ED TRIAGE NOTES
Chief Complaint   Patient presents with   • Rapid Heart Beat   Waken from sleep with a fast heart rate, he is on a monitor for this at this time, no pain however.  Called for ekg in triage.

## 2021-11-23 NOTE — ED NOTES
Orders recvd, saline lock with attempt at blood draw-unsuccessful. Er tech to attempt. Pt aware of pending cxr, call light in reach, denies further needs

## 2021-11-23 NOTE — ED NOTES
Dc instructions and prescription reviewed with pt and spouse. Pt aware of need to  same at [facility], f/u with pcp, return for worsening s/s. Work note also provided per pt request

## 2021-11-30 ENCOUNTER — PATIENT MESSAGE (OUTPATIENT)
Dept: MEDICAL GROUP | Facility: LAB | Age: 36
End: 2021-11-30

## 2021-12-08 NOTE — ED NOTES
Pt continues to rest quietly under 1:1 observation of tech.   Previously Declined (within the last year)

## 2021-12-21 PROCEDURE — 93268 ECG RECORD/REVIEW: CPT | Performed by: INTERNAL MEDICINE

## 2022-01-14 ENCOUNTER — TELEPHONE (OUTPATIENT)
Dept: CARDIOLOGY | Facility: MEDICAL CENTER | Age: 37
End: 2022-01-14

## 2022-07-10 ENCOUNTER — HOSPITAL ENCOUNTER (EMERGENCY)
Facility: MEDICAL CENTER | Age: 37
End: 2022-07-10
Attending: EMERGENCY MEDICINE
Payer: MEDICAID

## 2022-07-10 ENCOUNTER — APPOINTMENT (OUTPATIENT)
Dept: RADIOLOGY | Facility: MEDICAL CENTER | Age: 37
End: 2022-07-10
Attending: EMERGENCY MEDICINE
Payer: MEDICAID

## 2022-07-10 VITALS
WEIGHT: 182.98 LBS | OXYGEN SATURATION: 96 % | TEMPERATURE: 97.9 F | HEART RATE: 111 BPM | SYSTOLIC BLOOD PRESSURE: 139 MMHG | HEIGHT: 68 IN | DIASTOLIC BLOOD PRESSURE: 93 MMHG | BODY MASS INDEX: 27.73 KG/M2 | RESPIRATION RATE: 25 BRPM

## 2022-07-10 DIAGNOSIS — R06.02 SOB (SHORTNESS OF BREATH): ICD-10-CM

## 2022-07-10 DIAGNOSIS — R00.0 TACHYCARDIA: ICD-10-CM

## 2022-07-10 LAB
ALBUMIN SERPL BCP-MCNC: 4.5 G/DL (ref 3.2–4.9)
ALBUMIN/GLOB SERPL: 1.6 G/DL
ALP SERPL-CCNC: 74 U/L (ref 30–99)
ALT SERPL-CCNC: 24 U/L (ref 2–50)
ANION GAP SERPL CALC-SCNC: 12 MMOL/L (ref 7–16)
AST SERPL-CCNC: 19 U/L (ref 12–45)
BASOPHILS # BLD AUTO: 0.6 % (ref 0–1.8)
BASOPHILS # BLD: 0.06 K/UL (ref 0–0.12)
BILIRUB SERPL-MCNC: 0.7 MG/DL (ref 0.1–1.5)
BUN SERPL-MCNC: 16 MG/DL (ref 8–22)
CALCIUM SERPL-MCNC: 9.5 MG/DL (ref 8.4–10.2)
CHLORIDE SERPL-SCNC: 108 MMOL/L (ref 96–112)
CO2 SERPL-SCNC: 23 MMOL/L (ref 20–33)
CREAT SERPL-MCNC: 1.14 MG/DL (ref 0.5–1.4)
EKG IMPRESSION: NORMAL
EOSINOPHIL # BLD AUTO: 0.36 K/UL (ref 0–0.51)
EOSINOPHIL NFR BLD: 3.9 % (ref 0–6.9)
ERYTHROCYTE [DISTWIDTH] IN BLOOD BY AUTOMATED COUNT: 40.1 FL (ref 35.9–50)
FLUAV RNA SPEC QL NAA+PROBE: NEGATIVE
FLUBV RNA SPEC QL NAA+PROBE: NEGATIVE
GFR SERPLBLD CREATININE-BSD FMLA CKD-EPI: 86 ML/MIN/1.73 M 2
GLOBULIN SER CALC-MCNC: 2.9 G/DL (ref 1.9–3.5)
GLUCOSE SERPL-MCNC: 95 MG/DL (ref 65–99)
HCT VFR BLD AUTO: 45.1 % (ref 42–52)
HGB BLD-MCNC: 15.1 G/DL (ref 14–18)
IMM GRANULOCYTES # BLD AUTO: 0.03 K/UL (ref 0–0.11)
IMM GRANULOCYTES NFR BLD AUTO: 0.3 % (ref 0–0.9)
LYMPHOCYTES # BLD AUTO: 3.11 K/UL (ref 1–4.8)
LYMPHOCYTES NFR BLD: 33.3 % (ref 22–41)
MCH RBC QN AUTO: 27.2 PG (ref 27–33)
MCHC RBC AUTO-ENTMCNC: 33.5 G/DL (ref 33.7–35.3)
MCV RBC AUTO: 81.3 FL (ref 81.4–97.8)
MONOCYTES # BLD AUTO: 0.97 K/UL (ref 0–0.85)
MONOCYTES NFR BLD AUTO: 10.4 % (ref 0–13.4)
NEUTROPHILS # BLD AUTO: 4.81 K/UL (ref 1.82–7.42)
NEUTROPHILS NFR BLD: 51.5 % (ref 44–72)
NRBC # BLD AUTO: 0 K/UL
NRBC BLD-RTO: 0 /100 WBC
PLATELET # BLD AUTO: 309 K/UL (ref 164–446)
PMV BLD AUTO: 9.7 FL (ref 9–12.9)
POTASSIUM SERPL-SCNC: 4 MMOL/L (ref 3.6–5.5)
PROT SERPL-MCNC: 7.4 G/DL (ref 6–8.2)
RBC # BLD AUTO: 5.55 M/UL (ref 4.7–6.1)
RSV RNA SPEC QL NAA+PROBE: NEGATIVE
SARS-COV-2 RNA RESP QL NAA+PROBE: NOTDETECTED
SODIUM SERPL-SCNC: 143 MMOL/L (ref 135–145)
SPECIMEN SOURCE: NORMAL
T4 FREE SERPL-MCNC: 1.34 NG/DL (ref 0.93–1.7)
TROPONIN T SERPL-MCNC: 7 NG/L (ref 6–19)
TSH SERPL DL<=0.005 MIU/L-ACNC: 0.92 UIU/ML (ref 0.38–5.33)
WBC # BLD AUTO: 9.3 K/UL (ref 4.8–10.8)

## 2022-07-10 PROCEDURE — 93005 ELECTROCARDIOGRAM TRACING: CPT

## 2022-07-10 PROCEDURE — 84484 ASSAY OF TROPONIN QUANT: CPT

## 2022-07-10 PROCEDURE — 84443 ASSAY THYROID STIM HORMONE: CPT

## 2022-07-10 PROCEDURE — 80053 COMPREHEN METABOLIC PANEL: CPT

## 2022-07-10 PROCEDURE — 0241U HCHG SARS-COV-2 COVID-19 NFCT DS RESP RNA 4 TRGT MIC: CPT

## 2022-07-10 PROCEDURE — 84439 ASSAY OF FREE THYROXINE: CPT

## 2022-07-10 PROCEDURE — 93005 ELECTROCARDIOGRAM TRACING: CPT | Performed by: EMERGENCY MEDICINE

## 2022-07-10 PROCEDURE — 36415 COLL VENOUS BLD VENIPUNCTURE: CPT

## 2022-07-10 PROCEDURE — 700105 HCHG RX REV CODE 258: Performed by: EMERGENCY MEDICINE

## 2022-07-10 PROCEDURE — C9803 HOPD COVID-19 SPEC COLLECT: HCPCS | Performed by: EMERGENCY MEDICINE

## 2022-07-10 PROCEDURE — 94760 N-INVAS EAR/PLS OXIMETRY 1: CPT

## 2022-07-10 PROCEDURE — 85025 COMPLETE CBC W/AUTO DIFF WBC: CPT

## 2022-07-10 PROCEDURE — 71045 X-RAY EXAM CHEST 1 VIEW: CPT

## 2022-07-10 PROCEDURE — 99284 EMERGENCY DEPT VISIT MOD MDM: CPT

## 2022-07-10 RX ORDER — METOPROLOL SUCCINATE 25 MG/1
25 TABLET, EXTENDED RELEASE ORAL DAILY
Qty: 30 TABLET | Refills: 2 | Status: SHIPPED | OUTPATIENT
Start: 2022-07-10 | End: 2022-08-03

## 2022-07-10 RX ORDER — OMEPRAZOLE 20 MG/1
40 CAPSULE, DELAYED RELEASE ORAL EVERY EVENING
COMMUNITY

## 2022-07-10 RX ORDER — SODIUM CHLORIDE, SODIUM LACTATE, POTASSIUM CHLORIDE, CALCIUM CHLORIDE 600; 310; 30; 20 MG/100ML; MG/100ML; MG/100ML; MG/100ML
1000 INJECTION, SOLUTION INTRAVENOUS ONCE
Status: COMPLETED | OUTPATIENT
Start: 2022-07-10 | End: 2022-07-10

## 2022-07-10 RX ADMIN — SODIUM CHLORIDE, POTASSIUM CHLORIDE, SODIUM LACTATE AND CALCIUM CHLORIDE 1000 ML: 600; 310; 30; 20 INJECTION, SOLUTION INTRAVENOUS at 12:03

## 2022-07-10 ASSESSMENT — FIBROSIS 4 INDEX: FIB4 SCORE: 0.4

## 2022-07-10 NOTE — ED NOTES
D/c pt home,1 rx given . Pt aware of f/u instructions with cardiology  , aware to return for any changes or concerns. No further questions upon d/c home from ed  
Med rec updated and complete, per pt   Allergies reviewed, per pt   Pt reports no prescription medications or vitamins.  Pt reports no antibiotics in the last 30 days.  Pt reports that he stopped taking his PROPRANOLOL 10MG months ago.    
PT AO4, nad noted. VSS except ST 110s on monitor.   PIV established, blood work sent to lab. Covid swab collected.   Safety precautions in place including gurney locked in lowest position, call light within reach. Pt educated to use call light if requiring any assistance with getting oob.     
Rounded on patient. Pt sitting up in gurney, nad noted. VSS except HR low 100s Sinus Tach.   Pt denies any needs at this time.   
Hospital chart

## 2022-07-10 NOTE — DISCHARGE INSTRUCTIONS
Given that you have unexplained tachycardia and some new shortness of breath I will place you on some beta-blocker at low-dose to rate limit you and hopefully achieve the goal of a heart rate around 90 at baseline    Please follow-up with cardiology in the coming weeks to see how you are doing on this medication.  Preferably in 2 weeks time

## 2022-07-10 NOTE — Clinical Note
Олег Ramirez was seen and treated in our emergency department on 7/10/2022.  He may return to work on 07/11/2022.  No restrictions     If you have any questions or concerns, please don't hesitate to call.      Yaniv Bhagat M.D.

## 2022-07-10 NOTE — ED PROVIDER NOTES
ED Provider Note    CHIEF COMPLAINT  Chief Complaint   Patient presents with   • Shortness of Breath     Diff catching his breath x 1 yr Worse x few months Intermittent CP Currently resolved  Denies fever or cough  Pt NOT vaccinated for Covid       [facility]  Олег Ramirez is a 36 y.o. male who presents with a report that he has had difficulty for about a year with some baseline shortness of breath and this is been worse over the last 3 days.  Patient is unvaccinated for COVID and does have a history of persistent and unexplained tachycardia seen by his primary and Dr. Ferreira, cardiology over the course of this last year.  He has had an event recorder, echo and even discussion of beta-blocker therapy but currently is not on beta-blocker and comes in with a resting rate of 120.  He says that he has exercise intolerance but this is nothing new.  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 All other systems are negative.     PAST MEDICAL HISTORY  Past Medical History:   Diagnosis Date   • GERD (gastroesophageal reflux disease)        FAMILY HISTORY  Family History   Problem Relation Age of Onset   • Cancer Paternal Grandfather         esophageal   • No Known Problems Mother    • Diabetes Father    • Sleep Apnea Maternal Grandmother    • Sleep Apnea Paternal Grandmother        SOCIAL HISTORY   reports that he quit smoking about 15 months ago. His smoking use included cigarettes. He has never used smokeless tobacco. He reports current alcohol use. He reports that he does not use drugs.    SURGICAL HISTORY  Past Surgical History:   Procedure Laterality Date   • APPENDECTOMY LAPAROSCOPIC  9/14/2018    Procedure: APPENDECTOMY LAPAROSCOPIC;  Surgeon: Baljit Mondragon M.D.;  Location: SURGERY [facility];  Service: General   • APPENDECTOMY         CURRENT MEDICATIONS  Home  "Medications     Reviewed by Bahman Morgan (Pharmacy Tech) on 07/10/22 at 1151  Med List Status: Complete   Medication Last Dose Status   asa/apap/caffeine (EXCEDRIN) 250-250-65 MG Tab > 2 weeks Active   omeprazole (PRILOSEC) 20 MG delayed-release capsule 7/9/2022 Active                ALLERGIES  No Known Allergies    PHYSICAL EXAM  VITAL SIGNS: BP (!) 133/90   Pulse (!) 101   Temp 36.4 °C (97.6 °F) (Temporal)   Resp (!) 22   Ht 1.727 m (5' 8\")   Wt 83 kg (182 lb 15.7 oz)   SpO2 96%   BMI 27.82 kg/m²    Constitutional: Well developed, Well nourished, No acute distress, Non-toxic appearance.   HENT: Normocephalic, Atraumatic, Bilateral external ears normal, Oropharynx is clear mucous membranes are moist. No oral exudates or nasal discharge.   Eyes: Pupils are equal round and reactive, EOMI, Conjunctiva normal, No discharge.   Neck: Normal range of motion, No tenderness, Supple, No stridor. No meningismus.  Lymphatic: No lymphadenopathy noted.   Cardiovascular: Tachycardic rate and rhythm without murmur rub or gallop.  Thorax & Lungs: Clear breath sounds bilaterally without wheezes, rhonchi or rales. There is no chest wall tenderness.   Abdomen: Soft non-tender non-distended. There is no rebound or guarding. No organomegaly is appreciated. Bowel sounds are normal.  Skin: Normal without rash.   Back: No CVA or spinal tenderness.   Extremities: Intact distal pulses, No edema, No tenderness, No cyanosis, No clubbing. Capillary refill is less than 2 seconds.  Musculoskeletal: Good range of motion in all major joints. No tenderness to palpation or major deformities noted.   Neurologic: Alert & oriented x 3, Normal motor function, Normal sensory function, No focal deficits noted. Reflexes are normal.  Psychiatric: Affect normal, Judgment normal, Mood normal. There is no suicidal ideation or patient reported hallucinations.     EKG  Results for orders placed or performed during the hospital encounter of " 07/10/22   EKG   Result Value Ref Range    Report       [facility]    Test Date:  2022-07-10  Pt Name:    TRE VIRK               Department: [facility]  MRN:        [id number]                      Room:  Gender:     Male                         Technician: [id number]  :        1985                   Requested By:ER TRIAGE PROTOCOL  Order #:    [id number]                    Reading MD: DONA GRAF MD    Measurements  Intervals                                Axis  Rate:       119                          P:          57  UT:         133                          QRS:        -31  QRSD:       101                          T:          53  QT:         337  QTc:        475    Interpretive Statements  Sinus tachycardia  Left axis deviation  Borderline prolonged QT interval  Compared to ECG 2021 10:16:39  Left-axis deviation now present  Electronically Signed On 7- 13:36:15 PDT by DONA GRAF MD           RADIOLOGY/PROCEDURES  DX-CHEST-PORTABLE (1 VIEW)   Final Result      1.  Minimal LEFT lung base scar.   2.  No pneumonia or pulmonary edema.            COURSE & MEDICAL DECISION MAKING  Pertinent Labs & Imaging studies reviewed. (See chart for details)  Patient presents with a long history of unexplained tachycardia not on beta-blockers currently or other rate control.    Was concerned about the possibility of COVID in this patient who is unvaccinated but COVID testing is negative and influenza is also negative.    EKG reveals sinus tachycardia with no ischemic changes or dysrhythmia.    Chest x-ray shows no evidence of acute airspace disease, cardiomegaly or pulmonary edema    Laboratory evaluation reveals no leukocytosis, shift, anemia, electrolyte derangements or acidosis.  Troponins unremarkable.  Free T4 and TSH are also unremarkable ruling out hyperthyroidism    I discussed risk benefits of beta-blocker therapy in this patient who has some exercise intolerance and  baseline shortness of breath.  He had a PE study back in July 2020 and given his lack of leg findings and chronicity of this I did not perform another CT    Think the patient will need to follow-up with Dr. Becerra.  I am placing him on a low-dose beta-blocker which she is agreeable to and he will stay well-hydrated return if any significant change in symptoms follow-up    FINAL IMPRESSION  1. Tachycardia    2. SOB (shortness of breath)             Electronically signed by: Yaniv Bhagat M.D., 7/10/2022 1:37 PM

## 2022-07-27 ENCOUNTER — TELEPHONE (OUTPATIENT)
Dept: SCHEDULING | Facility: IMAGING CENTER | Age: 37
End: 2022-07-27

## 2022-08-03 ENCOUNTER — HOSPITAL ENCOUNTER (EMERGENCY)
Facility: MEDICAL CENTER | Age: 37
End: 2022-08-03
Attending: EMERGENCY MEDICINE
Payer: MEDICAID

## 2022-08-03 ENCOUNTER — APPOINTMENT (OUTPATIENT)
Dept: RADIOLOGY | Facility: MEDICAL CENTER | Age: 37
End: 2022-08-03
Attending: EMERGENCY MEDICINE
Payer: MEDICAID

## 2022-08-03 VITALS
TEMPERATURE: 96.8 F | BODY MASS INDEX: 28.1 KG/M2 | SYSTOLIC BLOOD PRESSURE: 122 MMHG | OXYGEN SATURATION: 95 % | RESPIRATION RATE: 18 BRPM | HEIGHT: 68 IN | DIASTOLIC BLOOD PRESSURE: 84 MMHG | WEIGHT: 185.41 LBS | HEART RATE: 100 BPM

## 2022-08-03 DIAGNOSIS — R00.0 TACHYCARDIA: ICD-10-CM

## 2022-08-03 DIAGNOSIS — R00.2 PALPITATIONS: ICD-10-CM

## 2022-08-03 LAB
ALBUMIN SERPL BCP-MCNC: 4.4 G/DL (ref 3.2–4.9)
ALBUMIN/GLOB SERPL: 1.4 G/DL
ALP SERPL-CCNC: 80 U/L (ref 30–99)
ALT SERPL-CCNC: 27 U/L (ref 2–50)
ANION GAP SERPL CALC-SCNC: 14 MMOL/L (ref 7–16)
AST SERPL-CCNC: 21 U/L (ref 12–45)
BASOPHILS # BLD AUTO: 0.8 % (ref 0–1.8)
BASOPHILS # BLD: 0.07 K/UL (ref 0–0.12)
BILIRUB SERPL-MCNC: 1 MG/DL (ref 0.1–1.5)
BUN SERPL-MCNC: 12 MG/DL (ref 8–22)
CALCIUM SERPL-MCNC: 9.4 MG/DL (ref 8.4–10.2)
CHLORIDE SERPL-SCNC: 104 MMOL/L (ref 96–112)
CO2 SERPL-SCNC: 23 MMOL/L (ref 20–33)
CREAT SERPL-MCNC: 0.9 MG/DL (ref 0.5–1.4)
D DIMER PPP IA.FEU-MCNC: 0.31 UG/ML (FEU) (ref 0–0.5)
EKG IMPRESSION: NORMAL
EOSINOPHIL # BLD AUTO: 0.47 K/UL (ref 0–0.51)
EOSINOPHIL NFR BLD: 5.2 % (ref 0–6.9)
ERYTHROCYTE [DISTWIDTH] IN BLOOD BY AUTOMATED COUNT: 39.6 FL (ref 35.9–50)
GFR SERPLBLD CREATININE-BSD FMLA CKD-EPI: 114 ML/MIN/1.73 M 2
GLOBULIN SER CALC-MCNC: 3.1 G/DL (ref 1.9–3.5)
GLUCOSE SERPL-MCNC: 147 MG/DL (ref 65–99)
HCT VFR BLD AUTO: 47.3 % (ref 42–52)
HGB BLD-MCNC: 15.8 G/DL (ref 14–18)
IMM GRANULOCYTES # BLD AUTO: 0.03 K/UL (ref 0–0.11)
IMM GRANULOCYTES NFR BLD AUTO: 0.3 % (ref 0–0.9)
LYMPHOCYTES # BLD AUTO: 3.09 K/UL (ref 1–4.8)
LYMPHOCYTES NFR BLD: 34.3 % (ref 22–41)
MCH RBC QN AUTO: 26.9 PG (ref 27–33)
MCHC RBC AUTO-ENTMCNC: 33.4 G/DL (ref 33.7–35.3)
MCV RBC AUTO: 80.6 FL (ref 81.4–97.8)
MONOCYTES # BLD AUTO: 0.84 K/UL (ref 0–0.85)
MONOCYTES NFR BLD AUTO: 9.3 % (ref 0–13.4)
NEUTROPHILS # BLD AUTO: 4.5 K/UL (ref 1.82–7.42)
NEUTROPHILS NFR BLD: 50.1 % (ref 44–72)
NRBC # BLD AUTO: 0 K/UL
NRBC BLD-RTO: 0 /100 WBC
PLATELET # BLD AUTO: 362 K/UL (ref 164–446)
PMV BLD AUTO: 10 FL (ref 9–12.9)
POTASSIUM SERPL-SCNC: 3.3 MMOL/L (ref 3.6–5.5)
PROT SERPL-MCNC: 7.5 G/DL (ref 6–8.2)
RBC # BLD AUTO: 5.87 M/UL (ref 4.7–6.1)
SODIUM SERPL-SCNC: 141 MMOL/L (ref 135–145)
TROPONIN T SERPL-MCNC: <6 NG/L (ref 6–19)
TROPONIN T SERPL-MCNC: <6 NG/L (ref 6–19)
WBC # BLD AUTO: 9 K/UL (ref 4.8–10.8)

## 2022-08-03 PROCEDURE — 85379 FIBRIN DEGRADATION QUANT: CPT

## 2022-08-03 PROCEDURE — 99284 EMERGENCY DEPT VISIT MOD MDM: CPT

## 2022-08-03 PROCEDURE — 80053 COMPREHEN METABOLIC PANEL: CPT

## 2022-08-03 PROCEDURE — 84484 ASSAY OF TROPONIN QUANT: CPT

## 2022-08-03 PROCEDURE — 36415 COLL VENOUS BLD VENIPUNCTURE: CPT

## 2022-08-03 PROCEDURE — 71045 X-RAY EXAM CHEST 1 VIEW: CPT

## 2022-08-03 PROCEDURE — 93005 ELECTROCARDIOGRAM TRACING: CPT | Performed by: EMERGENCY MEDICINE

## 2022-08-03 PROCEDURE — 700105 HCHG RX REV CODE 258: Performed by: EMERGENCY MEDICINE

## 2022-08-03 PROCEDURE — 85025 COMPLETE CBC W/AUTO DIFF WBC: CPT

## 2022-08-03 PROCEDURE — 93005 ELECTROCARDIOGRAM TRACING: CPT

## 2022-08-03 RX ORDER — SODIUM CHLORIDE 9 MG/ML
1000 INJECTION, SOLUTION INTRAVENOUS ONCE
Status: COMPLETED | OUTPATIENT
Start: 2022-08-03 | End: 2022-08-03

## 2022-08-03 RX ORDER — METOPROLOL TARTRATE 50 MG/1
50 TABLET, FILM COATED ORAL 2 TIMES DAILY
Qty: 60 TABLET | Refills: 1 | Status: SHIPPED | OUTPATIENT
Start: 2022-08-03

## 2022-08-03 RX ORDER — FAMOTIDINE 20 MG/1
20 TABLET, FILM COATED ORAL PRN
Status: SHIPPED | COMMUNITY
End: 2023-01-12

## 2022-08-03 RX ADMIN — SODIUM CHLORIDE 1000 ML: 9 INJECTION, SOLUTION INTRAVENOUS at 17:29

## 2022-08-03 RX ADMIN — SODIUM CHLORIDE 1000 ML: 9 INJECTION, SOLUTION INTRAVENOUS at 15:37

## 2022-08-03 ASSESSMENT — FIBROSIS 4 INDEX: FIB4 SCORE: 0.43

## 2022-08-03 NOTE — Clinical Note
Олег Ramirez was seen and treated in our emergency department on 8/3/2022.  He may return to work on 08/04/2022.  He is cleared to return to work      If you have any questions or concerns, please don't hesitate to call.      Jimmy Montgomery M.D.

## 2022-08-03 NOTE — ED PROVIDER NOTES
"ED Provider Note    Scribed for Jimmy Montgomery M.D. by Berna Carrillo. 8/3/2022, 3:22 PM.    Primary care provider: Jessy Rudolph M.D.  Means of arrival: Walk in   History obtained from: Patient  History limited by: None    CHIEF COMPLAINT  Chief Complaint   Patient presents with   • Chest Pain   • Tachycardia       HPI  Олег Ramirez is a 36 y.o. male who presents to the Emergency Department for evaluation of worsening heart palpitation. Patient reports that the heart palpitations feels like \"a muscle twitch on his chest\". He will have a heart palpation every time he lays down, and they are intermittent through out the day with a few episodes per day. The episode will last 10 seconds. Patient was seen by his cardiologist in the recent past for the palpitations, and they placed him metoprolol with no relief. Олег presents to the ED for heart palpitations that were stronger and lasted longer (about a minute) today. Patient is also complaining of hypertension; per family member, patient's blood pressure has been steadily increasing the past few weeks which is unusual.  He admits to associated symptoms of chest pressure and shortness of breath but denies unexplained weight loss, swelling or pain in one leg, fever, chills, or cough. No alleviating factors were reported. He rates the chest pressures a 1/10. His shortness of breath is exacerbated with laying flat. Олег has an appointment with a new cardiologist in October and an appointment with his PCP at the end of the month. He endorses taking Ecderon for migrains about two to three times per week, and drinking Mountain Dew. But he does not take cold medications or decongestants. Patient occasionally drinks alcohol, but does not use illicit drugs. He denies history of thyroid problems or heart attack. His father and sister have high heart rates. Patient denies recent travel.       Review of Records: Patient was in the ED on 7/10/22, and he " "had a work up that was unremarkable. He had a workup two years ago that was also unremarkable. He was started on a beta blocker.     REVIEW OF SYSTEMS  Pertinent positives include chest pressure, shortness of breath, hypertension, and heart palpitations. Pertinent negatives include unexplained weight loss, swelling or pain in one leg, fever, chills, or cough. All other systems negative.    PAST MEDICAL HISTORY   has a past medical history of GERD (gastroesophageal reflux disease).    SURGICAL HISTORY   has a past surgical history that includes appendectomy laparoscopic (2018) and appendectomy.    SOCIAL HISTORY  Social History     Tobacco Use   • Smoking status: Former Smoker     Types: Cigarettes     Quit date: 3/23/2021     Years since quittin.3   • Smokeless tobacco: Never Used   • Tobacco comment: 5 cigs a day, pack a week previously.    Vaping Use   • Vaping Use: Former   • Substances: Nicotine   Substance Use Topics   • Alcohol use: Yes     Comment: occasionally   • Drug use: No      Social History     Substance and Sexual Activity   Drug Use No       FAMILY HISTORY  Family History   Problem Relation Age of Onset   • Cancer Paternal Grandfather         esophageal   • No Known Problems Mother    • Diabetes Father    • Sleep Apnea Maternal Grandmother    • Sleep Apnea Paternal Grandmother        CURRENT MEDICATIONS  Current Outpatient Medications:   •  omeprazole (PRILOSEC) 20 MG delayed-release capsule, Take 40 mg by mouth every evening., Disp: , Rfl:   •  metoprolol SR (TOPROL XL) 25 MG TABLET SR 24 HR, Take 1 Tablet by mouth every day., Disp: 30 Tablet, Rfl: 2  •  asa/apap/caffeine (EXCEDRIN) 250-250-65 MG Tab, Take 2 Tablets by mouth every 6 hours as needed for Headache., Disp: , Rfl:       ALLERGIES  No Known Allergies    PHYSICAL EXAM  VITAL SIGNS: BP (!) 133/96   Pulse (!) 127   Temp 36 °C (96.8 °F) (Temporal)   Resp 18   Ht 1.727 m (5' 8\")   Wt 84.1 kg (185 lb 6.5 oz)   SpO2 95%   BMI " 28.19 kg/m²     Constitutional: Well developed, Well nourished, mild distress.   HENT: Normocephalic, Atraumatic, mask in place.  Eyes: Conjunctiva normal, No discharge.   Neck: Supple, No stridor  Cardiovascular: Tachycardic heart rate, Normal rhythm, No murmurs, equal pulses.   Pulmonary: Normal breath sounds, No respiratory distress, No wheezing, No rales, No rhonchi.  Chest: No chest wall tenderness or deformity.   Abdomen:Soft, No tenderness, No masses, no rebound, no guarding.   Musculoskeletal: No edema in legs. No major deformities noted, No tenderness.   Skin: Warm, Dry, No erythema, No rash.   Neurologic: Alert & oriented x 3, Normal motor function,  No focal deficits noted.   Psychiatric: Affect normal, Judgment normal, Mood normal.     LABS  Results for orders placed or performed during the hospital encounter of 08/03/22   CBC with Differential   Result Value Ref Range    WBC 9.0 4.8 - 10.8 K/uL    RBC 5.87 4.70 - 6.10 M/uL    Hemoglobin 15.8 14.0 - 18.0 g/dL    Hematocrit 47.3 42.0 - 52.0 %    MCV 80.6 (L) 81.4 - 97.8 fL    MCH 26.9 (L) 27.0 - 33.0 pg    MCHC 33.4 (L) 33.7 - 35.3 g/dL    RDW 39.6 35.9 - 50.0 fL    Platelet Count 362 164 - 446 K/uL    MPV 10.0 9.0 - 12.9 fL    Neutrophils-Polys 50.10 44.00 - 72.00 %    Lymphocytes 34.30 22.00 - 41.00 %    Monocytes 9.30 0.00 - 13.40 %    Eosinophils 5.20 0.00 - 6.90 %    Basophils 0.80 0.00 - 1.80 %    Immature Granulocytes 0.30 0.00 - 0.90 %    Nucleated RBC 0.00 /100 WBC    Neutrophils (Absolute) 4.50 1.82 - 7.42 K/uL    Lymphs (Absolute) 3.09 1.00 - 4.80 K/uL    Monos (Absolute) 0.84 0.00 - 0.85 K/uL    Eos (Absolute) 0.47 0.00 - 0.51 K/uL    Baso (Absolute) 0.07 0.00 - 0.12 K/uL    Immature Granulocytes (abs) 0.03 0.00 - 0.11 K/uL    NRBC (Absolute) 0.00 K/uL   Complete Metabolic Panel (CMP)   Result Value Ref Range    Sodium 141 135 - 145 mmol/L    Potassium 3.3 (L) 3.6 - 5.5 mmol/L    Chloride 104 96 - 112 mmol/L    Co2 23 20 - 33 mmol/L    Anion  Gap 14.0 7.0 - 16.0    Glucose 147 (H) 65 - 99 mg/dL    Bun 12 8 - 22 mg/dL    Creatinine 0.90 0.50 - 1.40 mg/dL    Calcium 9.4 8.4 - 10.2 mg/dL    AST(SGOT) 21 12 - 45 U/L    ALT(SGPT) 27 2 - 50 U/L    Alkaline Phosphatase 80 30 - 99 U/L    Total Bilirubin 1.0 0.1 - 1.5 mg/dL    Albumin 4.4 3.2 - 4.9 g/dL    Total Protein 7.5 6.0 - 8.2 g/dL    Globulin 3.1 1.9 - 3.5 g/dL    A-G Ratio 1.4 g/dL   Troponin   Result Value Ref Range    Troponin T <6 6 - 19 ng/L   D-DIMER   Result Value Ref Range    D-Dimer Screen 0.31 0.00 - 0.50 ug/mL (FEU)   ESTIMATED GFR   Result Value Ref Range    GFR (CKD-EPI) 114 >60 mL/min/1.73 m 2   TROPONIN   Result Value Ref Range    Troponin T <6 6 - 19 ng/L   EKG   Result Value Ref Range    Report       [facility].    Test Date:  2022  Pt Name:    TRE VIRK               Department: [facility]  MRN:        [id number]                      Room:  Gender:     Male                         Technician: MILLA  :        1985                   Requested By:ER TRIAGE PROTOCOL  Order #:    [id number]                    Reading MD: ROLAN CARBAJAL MD    Measurements  Intervals                                Axis  Rate:       131                          P:          0  DC:         95                           QRS:        -40  QRSD:       98                           T:          70  QT:         320  QTc:        473    Interpretive Statements  SINUS TACHYCARDIA, rate of 131, No ST elevation  LEFT AXIS DEVIATION  BORDERLINE PROLONGED QT INTERVAL  Compared to ECG 07/10/2022 11:11:09  No significant changes  Electronically Signed On 8-3-2022 15:26:11 PDT by ROLAN CARBAJAL MD       All labs reviewed by me.    EKG  12 Lead EKG interpreted by me as shown above.     RADIOLOGY  DX-CHEST-PORTABLE (1 VIEW)   Final Result      No evidence of acute cardiopulmonary process.        The radiologist's interpretation of all radiological studies have been reviewed  by me.    COURSE & MEDICAL DECISION MAKING  Pertinent Labs & Imaging studies reviewed. (See chart for details)    3:22 PM - Patient seen and examined at bedside. Patient will be treated with NS infusion 1,000 mL. The patient will receive IV fluids for tachycardia. Ordered DX-chest, CBC with diff, CMP, Troponin, D-dimer, and EKG to evaluate his symptoms. The differential diagnoses include but are not limited to: Tachycardia vs Dehydration vs PE.      4:06 PM - Patient will be treated with NS infusion 1,000 mL.     4:21 PM - Patient was reevaluated at bedside. Discussed lab and radiology results with the patient. I informed the patient that he can take 50 mg metoprolol. I discussed plan for discharge and follow up as outlined below. The patient is stable for discharge at this time and will return for any new or worsening symptoms. Patient verbalizes understanding and support with my plan for discharge.     HYDRATION: Based on the patient's presentation of Tachycardia the patient was given IV fluids. IV Hydration was used because oral hydration was not adequate alone. Upon recheck following hydration, the patient was mildly improved.    Medical Decision Making: At this point time patient is still tachycardic I do not have an exact reason for this patient has had a significant work-up including TSH and echo in the past that were unremarkable.  D-dimer is negative I do not think he has a pulmonary embolism.  His heart rate improved with IV fluids.  After discussion with cardiology we will increase his metoprolol to 50 mg twice daily and have him follow-up with cardiology as an outpatient.  Patient's heart score is 1     The patient will return for new or worsening symptoms and is stable at the time of discharge.    The patient is referred to a primary physician for blood pressure management, diabetic screening, and for all other preventative health concerns.      DISPOSITION:  Patient will be discharged home in stable  condition.    FOLLOW UP:  Graciela Ferreira M.D.  [address]   [address]  [place] [address]  [phone]    Schedule an appointment as soon as possible for a visit in 1 week        OUTPATIENT MEDICATIONS:  Discharge Medication List as of 8/3/2022  6:44 PM      START taking these medications    Details   metoprolol tartrate (LOPRESSOR) 50 MG Tab Take 1 Tablet by mouth 2 times a day., Disp-60 Tablet, R-1, Normal             FINAL IMPRESSION  1. Tachycardia    2. Palpitations          Berna AGUILA (Litzyibelva), am scribing for, and in the presence of, Jimmy Montgomery M.D.    Electronically signed by: Berna Carrillo (Jae), 8/3/2022    Jimmy AGUILA M.D. personally performed the services described in this documentation, as scribed by Berna Carrillo in my presence, and it is both accurate and complete.    The note accurately reflects work and decisions made by me.  Jimmy Montgomery M.D.  8/3/2022  10:16 PM

## 2022-08-03 NOTE — ED TRIAGE NOTES
Pt amb to triage c/o chest pressure, tachycardia x2wks, intermittently. Pt seen by cardiologist >1y ago for same s/s. Pt c/o feeling anxious.  EKG compl in triage.

## 2022-08-03 NOTE — ED NOTES
Med rec updated and complete, per pt   Allergies reviewed, per pt  Interviewed pt with girlfriend at bedside with permission from pt.

## 2022-08-03 NOTE — ED NOTES
Pt called RN to inform that he was having some palpitations. Cardiac monitor shows sinus tach without ectopy. MD notified. Pt VSS.

## 2022-08-04 NOTE — DISCHARGE INSTRUCTIONS
We are going to change your Metoprolol to twice a day. Return if you have new or different chest pain, shortness of breath, cough up blood or pass out.

## 2023-01-12 ENCOUNTER — APPOINTMENT (OUTPATIENT)
Dept: RADIOLOGY | Facility: MEDICAL CENTER | Age: 38
End: 2023-01-12
Attending: EMERGENCY MEDICINE
Payer: MEDICAID

## 2023-01-12 ENCOUNTER — HOSPITAL ENCOUNTER (EMERGENCY)
Facility: MEDICAL CENTER | Age: 38
End: 2023-01-12
Attending: EMERGENCY MEDICINE
Payer: MEDICAID

## 2023-01-12 VITALS
RESPIRATION RATE: 18 BRPM | OXYGEN SATURATION: 94 % | DIASTOLIC BLOOD PRESSURE: 94 MMHG | HEIGHT: 68 IN | TEMPERATURE: 98.4 F | BODY MASS INDEX: 28.83 KG/M2 | SYSTOLIC BLOOD PRESSURE: 139 MMHG | HEART RATE: 116 BPM | WEIGHT: 190.26 LBS

## 2023-01-12 DIAGNOSIS — R10.11 RUQ PAIN: ICD-10-CM

## 2023-01-12 DIAGNOSIS — G43.709 CHRONIC MIGRAINE W/O AURA, NOT INTRACTABLE, W/O STAT MIGR: ICD-10-CM

## 2023-01-12 DIAGNOSIS — R00.0 TACHYCARDIA: ICD-10-CM

## 2023-01-12 DIAGNOSIS — R06.02 CHRONIC SHORTNESS OF BREATH: ICD-10-CM

## 2023-01-12 DIAGNOSIS — M25.512 ACUTE PAIN OF LEFT SHOULDER: ICD-10-CM

## 2023-01-12 DIAGNOSIS — R07.89 CHEST WALL PAIN: ICD-10-CM

## 2023-01-12 DIAGNOSIS — Z77.098 EXPOSURE TO CHEMICAL INHALATION: ICD-10-CM

## 2023-01-12 LAB
ALBUMIN SERPL BCP-MCNC: 4.6 G/DL (ref 3.2–4.9)
ALBUMIN/GLOB SERPL: 1.6 G/DL
ALP SERPL-CCNC: 81 U/L (ref 30–99)
ALT SERPL-CCNC: 35 U/L (ref 2–50)
ANION GAP SERPL CALC-SCNC: 13 MMOL/L (ref 7–16)
AST SERPL-CCNC: 31 U/L (ref 12–45)
BASOPHILS # BLD AUTO: 0.5 % (ref 0–1.8)
BASOPHILS # BLD: 0.06 K/UL (ref 0–0.12)
BILIRUB SERPL-MCNC: 0.7 MG/DL (ref 0.1–1.5)
BUN SERPL-MCNC: 12 MG/DL (ref 8–22)
CALCIUM ALBUM COR SERPL-MCNC: 9.5 MG/DL (ref 8.5–10.5)
CALCIUM SERPL-MCNC: 10 MG/DL (ref 8.4–10.2)
CHLORIDE SERPL-SCNC: 101 MMOL/L (ref 96–112)
CO2 SERPL-SCNC: 25 MMOL/L (ref 20–33)
COMMENT 1642: NORMAL
CREAT SERPL-MCNC: 1.06 MG/DL (ref 0.5–1.4)
D DIMER PPP IA.FEU-MCNC: <0.27 UG/ML (FEU) (ref 0–0.5)
EKG IMPRESSION: NORMAL
EOSINOPHIL # BLD AUTO: 0.34 K/UL (ref 0–0.51)
EOSINOPHIL NFR BLD: 2.7 % (ref 0–6.9)
ERYTHROCYTE [DISTWIDTH] IN BLOOD BY AUTOMATED COUNT: 40.4 FL (ref 35.9–50)
GFR SERPLBLD CREATININE-BSD FMLA CKD-EPI: 94 ML/MIN/1.73 M 2
GLOBULIN SER CALC-MCNC: 2.8 G/DL (ref 1.9–3.5)
GLUCOSE SERPL-MCNC: 102 MG/DL (ref 65–99)
HCT VFR BLD AUTO: 47.5 % (ref 42–52)
HGB BLD-MCNC: 15.8 G/DL (ref 14–18)
IMM GRANULOCYTES # BLD AUTO: 0.03 K/UL (ref 0–0.11)
IMM GRANULOCYTES NFR BLD AUTO: 0.2 % (ref 0–0.9)
LIPASE SERPL-CCNC: 23 U/L (ref 7–58)
LYMPHOCYTES # BLD AUTO: 2.44 K/UL (ref 1–4.8)
LYMPHOCYTES NFR BLD: 19.1 % (ref 22–41)
MCH RBC QN AUTO: 26.6 PG (ref 27–33)
MCHC RBC AUTO-ENTMCNC: 33.3 G/DL (ref 33.7–35.3)
MCV RBC AUTO: 79.8 FL (ref 81.4–97.8)
MONOCYTES # BLD AUTO: 1.14 K/UL (ref 0–0.85)
MONOCYTES NFR BLD AUTO: 8.9 % (ref 0–13.4)
NEUTROPHILS # BLD AUTO: 8.79 K/UL (ref 1.82–7.42)
NEUTROPHILS NFR BLD: 68.6 % (ref 44–72)
NRBC # BLD AUTO: 0 K/UL
NRBC BLD-RTO: 0 /100 WBC
PLATELET # BLD AUTO: 252 K/UL (ref 164–446)
PMV BLD AUTO: 10.5 FL (ref 9–12.9)
POTASSIUM SERPL-SCNC: 3.9 MMOL/L (ref 3.6–5.5)
PROT SERPL-MCNC: 7.4 G/DL (ref 6–8.2)
RBC # BLD AUTO: 5.95 M/UL (ref 4.7–6.1)
SODIUM SERPL-SCNC: 139 MMOL/L (ref 135–145)
TROPONIN T SERPL-MCNC: 9 NG/L (ref 6–19)
WBC # BLD AUTO: 12.8 K/UL (ref 4.8–10.8)

## 2023-01-12 PROCEDURE — 36415 COLL VENOUS BLD VENIPUNCTURE: CPT

## 2023-01-12 PROCEDURE — 700102 HCHG RX REV CODE 250 W/ 637 OVERRIDE(OP): Performed by: EMERGENCY MEDICINE

## 2023-01-12 PROCEDURE — 85025 COMPLETE CBC W/AUTO DIFF WBC: CPT

## 2023-01-12 PROCEDURE — 84484 ASSAY OF TROPONIN QUANT: CPT

## 2023-01-12 PROCEDURE — 85379 FIBRIN DEGRADATION QUANT: CPT

## 2023-01-12 PROCEDURE — 99284 EMERGENCY DEPT VISIT MOD MDM: CPT

## 2023-01-12 PROCEDURE — 93005 ELECTROCARDIOGRAM TRACING: CPT | Performed by: EMERGENCY MEDICINE

## 2023-01-12 PROCEDURE — A9270 NON-COVERED ITEM OR SERVICE: HCPCS | Performed by: EMERGENCY MEDICINE

## 2023-01-12 PROCEDURE — 80053 COMPREHEN METABOLIC PANEL: CPT

## 2023-01-12 PROCEDURE — 83690 ASSAY OF LIPASE: CPT

## 2023-01-12 PROCEDURE — 71045 X-RAY EXAM CHEST 1 VIEW: CPT

## 2023-01-12 RX ORDER — PROCHLORPERAZINE EDISYLATE 5 MG/ML
10 INJECTION INTRAMUSCULAR; INTRAVENOUS ONCE
Status: DISCONTINUED | OUTPATIENT
Start: 2023-01-12 | End: 2023-01-12

## 2023-01-12 RX ORDER — SODIUM CHLORIDE 9 MG/ML
1000 INJECTION, SOLUTION INTRAVENOUS ONCE
Status: DISCONTINUED | OUTPATIENT
Start: 2023-01-12 | End: 2023-01-12 | Stop reason: HOSPADM

## 2023-01-12 RX ORDER — METOPROLOL TARTRATE 50 MG/1
50 TABLET, FILM COATED ORAL 2 TIMES DAILY
Qty: 180 TABLET | Refills: 0 | Status: SHIPPED | OUTPATIENT
Start: 2023-01-12 | End: 2023-04-12

## 2023-01-12 RX ORDER — DIPHENHYDRAMINE HCL 25 MG
25 TABLET ORAL ONCE
Status: DISCONTINUED | OUTPATIENT
Start: 2023-01-12 | End: 2023-01-12 | Stop reason: HOSPADM

## 2023-01-12 RX ORDER — METOPROLOL TARTRATE 50 MG/1
50 TABLET, FILM COATED ORAL ONCE
Status: COMPLETED | OUTPATIENT
Start: 2023-01-12 | End: 2023-01-12

## 2023-01-12 RX ORDER — KETOROLAC TROMETHAMINE 30 MG/ML
15 INJECTION, SOLUTION INTRAMUSCULAR; INTRAVENOUS ONCE
Status: DISCONTINUED | OUTPATIENT
Start: 2023-01-12 | End: 2023-01-12 | Stop reason: HOSPADM

## 2023-01-12 RX ORDER — ACETAMINOPHEN 500 MG
1000 TABLET ORAL ONCE
Status: COMPLETED | OUTPATIENT
Start: 2023-01-12 | End: 2023-01-12

## 2023-01-12 RX ORDER — DIPHENHYDRAMINE HYDROCHLORIDE 50 MG/ML
25 INJECTION INTRAMUSCULAR; INTRAVENOUS ONCE
Status: DISCONTINUED | OUTPATIENT
Start: 2023-01-12 | End: 2023-01-12

## 2023-01-12 RX ORDER — PROCHLORPERAZINE EDISYLATE 5 MG/ML
10 INJECTION INTRAMUSCULAR; INTRAVENOUS ONCE
Status: DISCONTINUED | OUTPATIENT
Start: 2023-01-12 | End: 2023-01-12 | Stop reason: HOSPADM

## 2023-01-12 RX ADMIN — METOPROLOL TARTRATE 50 MG: 50 TABLET, FILM COATED ORAL at 03:09

## 2023-01-12 RX ADMIN — ACETAMINOPHEN 1000 MG: 500 TABLET ORAL at 03:09

## 2023-01-12 ASSESSMENT — PAIN DESCRIPTION - PAIN TYPE: TYPE: ACUTE PAIN

## 2023-01-12 ASSESSMENT — FIBROSIS 4 INDEX: FIB4 SCORE: 0.39

## 2023-01-12 NOTE — ED TRIAGE NOTES
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  And having increase headache since he started his new job 2 days.  Denies any recent trauma.      BP (!) 131/94   Pulse (!) 130   Temp 37 °C (98.6 °F) (Temporal)   Resp 18   Ht 1.727 m (5' 8\")   Wt 86.3 kg (190 lb 4.1 oz)   SpO2 96%   BMI 28.93 kg/m²     "

## 2023-01-12 NOTE — ED NOTES
Another RN enter the room to attempt an IV placement but patient refused at this time.  Patient was medicated with oral medications as ordered.

## 2023-01-12 NOTE — ED NOTES
Discussed with pt the importance of allowing staff to attempt PIV, however pt would like to talk to ERP regarding rx metoprolol and would follow up with a PCP, refusing further IV sticks at this time.   Provided with PO fluids and notified ERP of pt decision. ERP to round with pt.   Report to ANAIS Boucher

## 2023-01-12 NOTE — ED NOTES
Олег Ramierz expresses desire to leave. Risks in leaving ED before treatment given and diagnostics completed discussed with patient. EP completed AMA form and patient  signed form.     Pt left AMA in ambulatory state.

## 2023-01-12 NOTE — ED NOTES
Report received from Nikita MANZO.     Rounded on pt. Pt in NAD.  Pt reports no further needs at this time.   Call bell within reach.

## 2023-01-12 NOTE — ED NOTES
2 attempts at PIV, unsuccessful however blood collected and sent to lab  XR is now at bedside  Will attempt PIV by US

## 2023-01-12 NOTE — ED PROVIDER NOTES
"ED Provider Note    Scribed for Gadiel Sapp M.D. by Gadiel Sapp M.D.. 1/12/2023, 2:01 AM.    Primary care provider: Jessy Rudolph M.D.  Means of arrival: self    CHIEF COMPLAINT  Chief Complaint   Patient presents with    Shoulder Pain    Headache     Patient report of right lung pain radiating to his right shoulder started 2 hours lasting about 10 seconds and thinks it contributed to his new job working around chemicals.  And having increase headache since he started his new job 2 days.  Denies any recent trauma.      HPI/ROS  Олег Ramirez is a 37 y.o. male who presents to the Emergency Department for evaluation of right-sided chest wall discomfort, right upper abdominal discomfort and some referred shoulder pain.  This started earlier today when he was at work.  He does lift heavy objects at the [employer] and says he is also been exposed to some chemicals in the air of which he breathing that caused him to feel somewhat short of breath, somewhat lightheaded and exacerbated his chronic migraines.  He notes that he has had problems with a elevated heart rate in the past and says that he has not had any recent chest pressure sensations but has chronic shortness of breath and says this is making it feel somewhat \"worse.\"  He is denying any recent traumatic injury, falls or assaults and notes that he has had prior work-ups in the past for his fast heart rate and was put on metoprolol which he ran out.  He just got new insurance through this job and is planning on following up there is not been taking his metoprolol for some time.  Over the last several months he denies any syncopal events, no exertional chest discomfort, no other acute constitutional complaints or fevers.    EXTERNAL RECORDS REVIEWED  Outpatient records, cardiology, prior ER visits for tachycardia and pleuritic-like chest discomfort.  Patient had a resting heart rate in the 120s, was eventually given extensive work-up " "with echo, TSH and event recorder and was eventually placed on metoprolol after this work-up was unremarkable.  Repeat ER visit for similar events in August of last year resulted in a similar noneventful work-up and consultation with cardiology yielded an increase in the patient's metoprolol at that time.    LIMITATION TO HISTORY   Select: : None    OUTSIDE HISTORIAN(S):  Select: none    PAST MEDICAL HISTORY   has a past medical history of GERD (gastroesophageal reflux disease) and Migraine.    SURGICAL HISTORY   has a past surgical history that includes appendectomy laparoscopic (2018) and appendectomy.    SOCIAL HISTORY  Social History     Tobacco Use    Smoking status: Former     Types: Cigarettes     Quit date: 3/23/2021     Years since quittin.8    Smokeless tobacco: Never    Tobacco comments:     5 cigs a day, pack a week previously.    Vaping Use    Vaping Use: Former    Substances: Nicotine   Substance Use Topics    Alcohol use: Yes     Comment: occasionally    Drug use: No      Social History     Substance and Sexual Activity   Drug Use No       FAMILY HISTORY  Family History   Problem Relation Age of Onset    Cancer Paternal Grandfather         esophageal    No Known Problems Mother     Diabetes Father     Sleep Apnea Maternal Grandmother     Sleep Apnea Paternal Grandmother        CURRENT MEDICATIONS  Home Medications       Reviewed by Lyndsay Vega R.N. (Registered Nurse) on 23 at 0152  Med List Status: Complete     Medication Last Dose Status   asa/apap/caffeine (EXCEDRIN) 250-250-65 MG Tab 2023 Active   metoprolol tartrate (LOPRESSOR) 50 MG Tab last dose 8 weeks ago Active   omeprazole (PRILOSEC) 20 MG delayed-release capsule 2023 Active                    ALLERGIES  No Known Allergies    PHYSICAL EXAM  VITAL SIGNS: BP (!) 140/95   Pulse (!) 125   Temp 37 °C (98.6 °F) (Temporal)   Resp 20   Ht 1.727 m (5' 8\")   Wt 86.3 kg (190 lb 4.1 oz)   SpO2 95%   BMI 28.93 kg/m² "   Vitals reviewed by myself.  Genl: M sitting in gurney comfortably, speaking clearly, appears very anxious but in no acute distress   Head: NC/AT   ENT: Mucous membranes moist, posterior pharynx clear, uvula midline, nares patent bilaterally   Eyes: Normal sclera, pupils equal round reactive to light  Neck: Supple, FROM, no LAD appreciated   Pulmonary: Lungs are clear to auscultation bilaterally  Chest: No TTP  CV:  Tachycardia, no murmur appreciated, pulses 2+ in both upper and lower extremities,  Abdomen: soft, nonspecific and inconsistent right upper quadrant discomfort along the costophrenic margin. ND; no rebound/guarding, no masses palpated, no HSM   : no CVA or suprapubic tenderness   Musculoskeletal: Pain free ROM of the neck. Moving upper and lower extremities and spontaneous in coordinated fashion  Neuro: A&Ox4 (person, place, time, situation), speech fluent, gait steady, no focal deficits appreciated  Skin: No rash or lesions.  No pallor or jaundice.  No cyanosis.  Warm and dry.     DIAGNOSTIC STUDIES:  LABS  Labs Reviewed   CBC WITH DIFFERENTIAL - Abnormal; Notable for the following components:       Result Value    WBC 12.8 (*)     MCV 79.8 (*)     MCH 26.6 (*)     MCHC 33.3 (*)     Lymphocytes 19.10 (*)     Neutrophils (Absolute) 8.79 (*)     Monos (Absolute) 1.14 (*)     All other components within normal limits    Narrative:     Biotin intake of greater than 5 mg per day may interfere with  troponin levels, causing false low values.   COMP METABOLIC PANEL - Abnormal; Notable for the following components:    Glucose 102 (*)     All other components within normal limits    Narrative:     Biotin intake of greater than 5 mg per day may interfere with  troponin levels, causing false low values.   TROPONIN    Narrative:     Biotin intake of greater than 5 mg per day may interfere with  troponin levels, causing false low values.   D-DIMER    Narrative:     Biotin intake of greater than 5 mg per day may  interfere with  troponin levels, causing false low values.   LIPASE    Narrative:     Biotin intake of greater than 5 mg per day may interfere with  troponin levels, causing false low values.   DIFFERENTIAL COMMENT    Narrative:     Biotin intake of greater than 5 mg per day may interfere with  troponin levels, causing false low values.   CORRECTED CALCIUM    Narrative:     Biotin intake of greater than 5 mg per day may interfere with  troponin levels, causing false low values.   ESTIMATED GFR    Narrative:     Biotin intake of greater than 5 mg per day may interfere with  troponin levels, causing false low values.   All labs reviewed and independently interpreted by myself    EKG Interpretation:  Results for orders placed or performed during the hospital encounter of 23   EKG (Now)   Result Value Ref Range    Report       [facility] Emergency Dept.    Test Date:  2023  Pt Name:    TRE VIRK               Department: [facility]  MRN:        [id number]                      [facility]   Gender:     Male                         Technician: MYRANDA  :        1985                   Requested By:NEVA SWEET  Order #:    [id number]                    Reading MD:    Measurements  Intervals                                Axis  Rate:       123                          P:          105  SC:         128                          QRS:        -28  QRSD:       98                           T:          147  QT:         324  QTc:        464    Interpretive Statements  SINUS TACHYCARDIA  BORDERLINE LEFT AXIS DEVIATION  LOW VOLTAGE IN FRONTAL LEADS  NONSPECIFIC T ABNORMALITIES, LATERAL LEADS  LEAD(S) II WERE NOT USED FOR MORPHOLOGY ANALYSIS  Compared to ECG 2022 14:56:30  Low QRS voltage now present  T-wave abnormality now present  ST (T wave) deviation no longer present       RADIOLOGY  Images independently interpreted by myself prior to radiologist review: No cardiomegaly, no right  lower lung field abnormalities such as consolidations or effusions.  Final interpretation by radiology demonstrates:    DX-CHEST-PORTABLE (1 VIEW)   Final Result         1.  No acute cardiopulmonary disease.        The radiologist's interpretation of all radiological studies have been reviewed by me.    COURSE & MEDICAL DECISION MAKING    ED Observation Status? No; Patient does not meet criteria for ED Observation.     INITIAL ASSESSMENT AND PLAN    Patient is evaluated for symptoms as described above.  He is alert, oriented and describes sudden onset chest discomfort on the right-hand side this is feels worse than when he has chronically.  He has been seen on multiple previous occasions for chest discomfort and pleuritic type chest pain with tachycardia.  On the previous work-ups he had a no identifiable cause, was started on beta-blockers and has come off of these medications and not followed up as previously directed.  Today some of this is slightly exacerbated by exposure to some of the chemicals.  It is reassuring on his oropharyngeal exam that he has no uvula edema, no evidence of acute respiratory distress or excess elevation.  There is no restricted air movement I do not believe he has a chemical pneumonitis.  He has some nonspecific right upper quadrant/right lower chest wall discomfort that could come from a pneumonia, subdiaphragmatic process and I cannot exclude the possibility of PE.  PE is less likely as he has had multiple prior work-ups for this concern though inappropriate tachycardia and pleuritic type chest discomfort does give me a strong consideration for this.    Lab work shows very scant leukocytosis, slight neutrophil elevation and predominance with a monocyte predominance but no evidence of bandemia.  He has no gross electrolyte derangements, no LFT elevations, lipase is not elevated and I doubt has obstructive hepatobiliary process or pancreatitis.  His troponin is not elevated and the  "patient's D-dimer is not elevated.    This x-ray shows no focal consolidations or evidence of right lower lung pathology I went back to the bedside where the patient had refused further IV attempts.  He is drinking oral fluids and was amenable to taking his home Lopressor dose along with some Tylenol.  He is refusing other medications and says his headache is very slightly improved and his shoulder pain has disappeared and he currently has no chest pressure or any other acute symptomology.  Patient is refusing any further attempts and interventions and says he would like to have a referral to her primary care office and refill of his metoprolol.    Spent some time at the bedside offering the patient's other intramuscular alleviations for his discomfort and recommended watching him try to improve his oral intake to see if his heart rate would improve.  He does not want to stay for this and says \"I have lots of scans and lots of work-ups and everything has been negative, I like to go home.\"  I do believe that to some degree this is very reasonable based on his previous documented diagnostic work-ups and he now presents with very similar physiologic findings.  He does not have signs of acute peritonitis, he is not have exertional chest discomfort or features that would point towards an acute ACS.  He does not have any longstanding or concerning features from his inhaled chemical exposure and is on room air and saturating well with no tachypnea.  At this time I will have the patient sign out AMA as he is not able to complete the medical work-up and is refusing any further medications.  I have no problem placing a PCP referral and restarting his home Lopressor doses this is a previously established medication with a longstanding medical work-up from last year.  Questions are addressed with the patient is discharged home    FINAL IMPRESSION  1. Acute pain of left shoulder    2. Exposure to chemical inhalation    3. " Chronic shortness of breath    4. RUQ pain    5. Chest wall pain    6. Chronic migraine w/o aura, not intractable, w/o stat migr    7. Tachycardia       The note accurately reflects work and decisions made by me.  Gadiel Sapp M.D.  1/12/2023  2:42 AM

## 2023-01-20 ENCOUNTER — TELEPHONE (OUTPATIENT)
Dept: HEALTH INFORMATION MANAGEMENT | Facility: OTHER | Age: 38
End: 2023-01-20

## 2023-01-22 ENCOUNTER — OFFICE VISIT (OUTPATIENT)
Dept: URGENT CARE | Facility: CLINIC | Age: 38
End: 2023-01-22
Payer: MEDICAID

## 2023-01-22 VITALS
WEIGHT: 180 LBS | BODY MASS INDEX: 27.28 KG/M2 | TEMPERATURE: 97.8 F | SYSTOLIC BLOOD PRESSURE: 142 MMHG | DIASTOLIC BLOOD PRESSURE: 86 MMHG | RESPIRATION RATE: 18 BRPM | HEIGHT: 68 IN | OXYGEN SATURATION: 98 % | HEART RATE: 107 BPM

## 2023-01-22 DIAGNOSIS — G43.109 MIGRAINE WITH AURA AND WITHOUT STATUS MIGRAINOSUS, NOT INTRACTABLE: ICD-10-CM

## 2023-01-22 PROCEDURE — 99213 OFFICE O/P EST LOW 20 MIN: CPT

## 2023-01-22 RX ORDER — KETOROLAC TROMETHAMINE 30 MG/ML
30 INJECTION, SOLUTION INTRAMUSCULAR; INTRAVENOUS ONCE
Status: DISCONTINUED | OUTPATIENT
Start: 2023-01-22 | End: 2023-01-22

## 2023-01-22 ASSESSMENT — FIBROSIS 4 INDEX: FIB4 SCORE: 0.73

## 2023-01-22 ASSESSMENT — ENCOUNTER SYMPTOMS
HEADACHES: 1
PHOTOPHOBIA: 0
BLURRED VISION: 0

## 2023-01-22 NOTE — LETTER
"[facility] [place] [id number] [facility] [id number]     January 22, 2023    Patient: Олег Ramirez   YOB: 1985   Date of Visit: 1/22/2023       To Whom It May Concern:    Олег Ramirez was seen and treated in our department on 1/22/2023.  Please excuse absence on 1/21/2023.    Sincerely,     Erica Allen \"Isauro\" JOSE D Townsend                 "

## 2023-01-23 NOTE — PROGRESS NOTES
Subjective     Олег Ramirez is a 37 y.o. male who presents with Migraine (Hx of migraines with n/v, x last night. Needs work note)            HPI    Patient presents with headache that started last night.  He reports that he gets migraine headaches and has been taking Excedrin for it, usually this provides relief.  Last night, he experienced headache, which she describes as achy, behind his left eye.  He reports his headache to be 3 out of 10 currently.  He reports his headaches are usually triggered by smoking, lack of sleep, stress, and lack of food.  Patient further reports 1 episode of vomiting last night.  He denies any blurring of vision, doubling of vision, or dizziness.  He further denies any weakness, numbness, or tingling on one side of his body.    Patient's current problem list, medications, and past medical/surgical history were reviewed in Epic.    PMH:  has a past medical history of GERD (gastroesophageal reflux disease) and Migraine.  MEDS:   Current Outpatient Medications:     metoprolol tartrate (LOPRESSOR) 50 MG Tab, Take 1 Tablet by mouth 2 times a day for 90 days., Disp: 180 Tablet, Rfl: 0    metoprolol tartrate (LOPRESSOR) 50 MG Tab, Take 1 Tablet by mouth 2 times a day., Disp: 60 Tablet, Rfl: 1    omeprazole (PRILOSEC) 20 MG delayed-release capsule, Take 40 mg by mouth every evening., Disp: , Rfl:     asa/apap/caffeine (EXCEDRIN) 250-250-65 MG Tab, Take 2 Tablets by mouth every 6 hours as needed for Headache., Disp: , Rfl:   ALLERGIES: No Known Allergies  SURGHX:   Past Surgical History:   Procedure Laterality Date    APPENDECTOMY LAPAROSCOPIC  9/14/2018    Procedure: APPENDECTOMY LAPAROSCOPIC;  Surgeon: Baljit Mondragon M.D.;  Location: SURGERY [facility];  Service: General    APPENDECTOMY       SOCHX:  reports that he quit smoking about 22 months ago. His smoking use included cigarettes. He has never used smokeless tobacco. He reports current alcohol use. He reports that he  "does not use drugs.  FH: Reviewed with patient, not pertinent to this visit.       Review of Systems   Eyes:  Negative for blurred vision and photophobia.   Neurological:  Positive for headaches.   All other systems reviewed and are negative.           Objective     BP (!) 142/86   Pulse (!) 107   Temp 36.6 °C (97.8 °F)   Resp 18   Ht 1.727 m (5' 8\")   Wt 81.6 kg (180 lb)   SpO2 98%   BMI 27.37 kg/m²      Physical Exam  Constitutional:       Appearance: Normal appearance.   HENT:      Head: Normocephalic.      Nose: Nose normal.   Eyes:      Extraocular Movements: Extraocular movements intact.   Cardiovascular:      Rate and Rhythm: Normal rate and regular rhythm.      Pulses: Normal pulses.      Heart sounds: Normal heart sounds.   Pulmonary:      Effort: Pulmonary effort is normal.      Breath sounds: Normal breath sounds.   Musculoskeletal:         General: Normal range of motion.      Cervical back: Normal range of motion.   Skin:     General: Skin is warm.   Neurological:      General: No focal deficit present.      Mental Status: He is alert and oriented to person, place, and time.      Cranial Nerves: No cranial nerve deficit, dysarthria or facial asymmetry.      Motor: Motor function is intact.      Coordination: Coordination is intact.      Gait: Gait is intact.   Psychiatric:         Mood and Affect: Mood normal.         Behavior: Behavior normal.         Assessment & Plan        1. Migraine with aura and without status migrainosus, not intractable    - ketorolac (TORADOL) injection 30 mg    Patient's physical examination and neurologic examinations are unremarkable.  He refused offered Toradol IM here in the clinic.  Patient is educated on avoidance of triggers.  His blood pressure today is a little elevated.  Recommended rest, and adhering to low-salt low-fat diet.  He is currently on metoprolol, but has not taken this today.  Instructed to monitor his blood pressure readings at home, and to " record and report anything that is over 140/90.  Advised follow-up with his PCP.  Discussed treatment plan with patient, he is agreeable and verbalized understanding.  Educated patient on signs and symptoms watch out for, when to return to the clinic or go to the ER.      Work note given    Electronically Signed by JOSE D Jerez

## 2023-01-26 ENCOUNTER — HOSPITAL ENCOUNTER (EMERGENCY)
Facility: MEDICAL CENTER | Age: 38
End: 2023-01-26
Attending: EMERGENCY MEDICINE
Payer: MEDICAID

## 2023-01-26 VITALS
OXYGEN SATURATION: 96 % | WEIGHT: 186.95 LBS | TEMPERATURE: 97.7 F | SYSTOLIC BLOOD PRESSURE: 132 MMHG | RESPIRATION RATE: 16 BRPM | HEIGHT: 68 IN | BODY MASS INDEX: 28.33 KG/M2 | HEART RATE: 91 BPM | DIASTOLIC BLOOD PRESSURE: 85 MMHG

## 2023-01-26 DIAGNOSIS — B34.9 VIRAL SYNDROME: ICD-10-CM

## 2023-01-26 DIAGNOSIS — J02.9 VIRAL PHARYNGITIS: ICD-10-CM

## 2023-01-26 LAB — S PYO DNA SPEC NAA+PROBE: NOT DETECTED

## 2023-01-26 PROCEDURE — 0241U HCHG SARS-COV-2 COVID-19 NFCT DS RESP RNA 4 TRGT MIC: CPT

## 2023-01-26 PROCEDURE — 700102 HCHG RX REV CODE 250 W/ 637 OVERRIDE(OP): Performed by: EMERGENCY MEDICINE

## 2023-01-26 PROCEDURE — C9803 HOPD COVID-19 SPEC COLLECT: HCPCS | Performed by: EMERGENCY MEDICINE

## 2023-01-26 PROCEDURE — 87651 STREP A DNA AMP PROBE: CPT

## 2023-01-26 PROCEDURE — 99283 EMERGENCY DEPT VISIT LOW MDM: CPT

## 2023-01-26 PROCEDURE — A9270 NON-COVERED ITEM OR SERVICE: HCPCS | Performed by: EMERGENCY MEDICINE

## 2023-01-26 RX ORDER — IBUPROFEN 600 MG/1
600 TABLET ORAL ONCE
Status: COMPLETED | OUTPATIENT
Start: 2023-01-26 | End: 2023-01-26

## 2023-01-26 RX ORDER — BENZONATATE 100 MG/1
100 CAPSULE ORAL 3 TIMES DAILY PRN
Qty: 60 CAPSULE | Refills: 0 | Status: SHIPPED | OUTPATIENT
Start: 2023-01-26 | End: 2023-07-10

## 2023-01-26 RX ORDER — ALBUTEROL SULFATE 90 UG/1
2 AEROSOL, METERED RESPIRATORY (INHALATION) EVERY 6 HOURS PRN
Qty: 8.5 G | Refills: 0 | Status: SHIPPED | OUTPATIENT
Start: 2023-01-26 | End: 2023-07-10

## 2023-01-26 RX ADMIN — IBUPROFEN 600 MG: 600 TABLET, FILM COATED ORAL at 20:15

## 2023-01-26 ASSESSMENT — LIFESTYLE VARIABLES
EVER FELT BAD OR GUILTY ABOUT YOUR DRINKING: NO
TOTAL SCORE: 0
HAVE YOU EVER FELT YOU SHOULD CUT DOWN ON YOUR DRINKING: NO
DO YOU DRINK ALCOHOL: NO
TOTAL SCORE: 0
HAVE PEOPLE ANNOYED YOU BY CRITICIZING YOUR DRINKING: NO
EVER HAD A DRINK FIRST THING IN THE MORNING TO STEADY YOUR NERVES TO GET RID OF A HANGOVER: NO
CONSUMPTION TOTAL: INCOMPLETE
TOTAL SCORE: 0

## 2023-01-26 ASSESSMENT — FIBROSIS 4 INDEX: FIB4 SCORE: 0.73

## 2023-01-27 LAB
FLUAV RNA SPEC QL NAA+PROBE: NEGATIVE
FLUBV RNA SPEC QL NAA+PROBE: NEGATIVE
RSV RNA SPEC QL NAA+PROBE: NEGATIVE
SARS-COV-2 RNA RESP QL NAA+PROBE: DETECTED
SPECIMEN SOURCE: ABNORMAL

## 2023-01-27 NOTE — ED PROVIDER NOTES
ER Provider Note    Scribed for Jimmy Montgomery MD by Alistair Abraham. 1/26/2023   7:46 PM    Primary Care Provider: Jessy Rudolph M.D.    CHIEF COMPLAINT  Chief Complaint   Patient presents with    Sore Throat     Started 3 days ago    Body Aches     Started 3 days ago and can not sleep.     EXTERNAL RECORDS REVIEWED  Other None noted    HPI/ROS  LIMITATION TO HISTORY   Select: : None  OUTSIDE HISTORIAN(S):  Family at bedside provided additional history regarding known sick contacts    Олег Ramirez is a 37 y.o. male who presents to the ED for evaluation of body aches onset 4 days ago. The patient states he also has 101.2 °F fever, cough, sore throat, difficulty sleeping, and congestion, but denies any nausea, vomiting, diarrhea, or shortness of breath. No alleviating or exacerbating factors were noted. He has no known sick contacts. He is not vaccinated against COVID or Flu, and states that he is not interested in being vaccinated for either today. He is not interested in Paxlovid today. He has no known drug allergies. He states that he still has his tonsils. He has tubes placed in both eardrums. He denies any recent history of strep infections.    PAST MEDICAL HISTORY  Past Medical History:   Diagnosis Date    GERD (gastroesophageal reflux disease)     Migraine      SURGICAL HISTORY  Past Surgical History:   Procedure Laterality Date    APPENDECTOMY LAPAROSCOPIC  9/14/2018    Procedure: APPENDECTOMY LAPAROSCOPIC;  Surgeon: Baljit Mondragon M.D.;  Location: SURGERY [facility];  Service: General    APPENDECTOMY         FAMILY HISTORY  Family History   Problem Relation Age of Onset    Cancer Paternal Grandfather         esophageal    No Known Problems Mother     Diabetes Father     Sleep Apnea Maternal Grandmother     Sleep Apnea Paternal Grandmother        SOCIAL HISTORY   reports that he quit smoking about 22 months ago. His smoking use included cigarettes. He has never used  "smokeless tobacco. He reports current alcohol use. He reports that he does not use drugs.    CURRENT MEDICATIONS  Discharge Medication List as of 1/26/2023  9:59 PM        CONTINUE these medications which have NOT CHANGED    Details   !! metoprolol tartrate (LOPRESSOR) 50 MG Tab Take 1 Tablet by mouth 2 times a day for 90 days., Disp-180 Tablet, R-0, Normal      !! metoprolol tartrate (LOPRESSOR) 50 MG Tab Take 1 Tablet by mouth 2 times a day., Disp-60 Tablet, R-1, Normal      omeprazole (PRILOSEC) 20 MG delayed-release capsule Take 40 mg by mouth every evening., Historical Med      asa/apap/caffeine (EXCEDRIN) 250-250-65 MG Tab Take 2 Tablets by mouth every 6 hours as needed for Headache., Historical Med       !! - Potential duplicate medications found. Please discuss with provider.          ALLERGIES  No Known Allergies     PHYSICAL EXAM  BP (!) 137/94   Pulse (!) 108   Temp 36.4 °C (97.6 °F) (Temporal)   Resp 16   Ht 1.727 m (5' 8\")   Wt 84.8 kg (186 lb 15.2 oz)   SpO2 97%   BMI 28.43 kg/m²      Pulse ox interpretation: I interpret this pulse ox as normal.  Constitutional: Alert in no apparent distress.  HENT: Normocephalic, Atraumatic, Oropharynx moist, No oral exudates.  Throat is erythematous.  Eyes: Conjunctiva normal, No discharge.   Neck: Supple, No stridor, Mild lymphadenopathy.   Heart: Regular rate and rythm, no murmurs.    Lungs: Clear to auscultation bilaterally.  Skin: Warm, Dry, No erythema, No rash.   Neurologic: Alert, Grossly non-focal.   Psychiatric: Affect normal, Judgment normal, Mood normal, Appears appropriate and not intoxicated.     DIAGNOSTIC STUDIES    Labs:   Results for orders placed or performed during the hospital encounter of 01/26/23   Group A Strep by PCR    Specimen: Throat   Result Value Ref Range    Group A Strep by PCR Not Detected Not Detected   CoV-2, FLU A/B, and RSV by PCR (2-4 Hours CEPHEID) : Collect NP swab in VTM    Specimen: Respirate   Result Value Ref Range    " Influenza virus A RNA Negative Negative    Influenza virus B, PCR Negative Negative    RSV, PCR Negative Negative    SARS-CoV-2 by PCR DETECTED (AA)     SARS-CoV-2 Source Nasal Swab    Review of laboratory results reveal positive for COVID.      COURSE & MEDICAL DECISION MAKING     ED Observation Status? No; Patient does not meet criteria for ED Observation.     INITIAL ASSESSMENT AND PLAN    Care Narrative:     7:46 PM - Patient was evaluated at bedside. Ordered for Group A Strep and CoV-2 Flu A/B and RSV PCR to evaluate. Patient verbalizes understanding and support with my plan of care.  Differential diagnoses include but not limited to: COVID, Flu, or Strep.     9:44 PM - Patient was reevaluated at bedside. Discussed lab results with the patient and informed them that his illness is likely viral in origin. The patient is requesting a work note for yesterday. I discussed plan for discharge and follow up as outlined below. The patient is stable for discharge at this time and will return for any new or worsening symptoms. Patient verbalizes understanding and support with my plan for discharge.      ADDITIONAL PROBLEM LIST AND DISPOSITION  Problem #1: Sore throat.  At this point time I think this is secondary Covid19.  Initially was concerned about possible strep therefore strep test was done but this is negative.  At this point time patient is not hypoxic, his lung exam is clear I do not think he needs a chest x-ray.  Discussed symptomatic treatment with the patient.  I did offer him Paxlovid but the patient was not interested in this.    I have discussed management of the patient with the following physicians and TELLO's:  None    Discussion of management with other QHP or appropriate source(s): None     Escalation of care considered, and ultimately not performed: diagnostic imaging.    Barriers to care at this time, including but not limited to:  None noted .     Decision tools and prescription drugs considered  including, but not limited to: Medication modification Albuterol and Tessalon .     The patient will return for new or worsening symptoms and is stable at the time of discharge.    The patient is referred to a primary physician for blood pressure management, diabetic screening, and for all other preventative health concerns.        DISPOSITION:  Patient will be discharged home in stable condition.    FOLLOW UP:  Jessy Rudolph M.D.  [id number] [address]  [phone]    Schedule an appointment as soon as possible for a visit in 1 week        OUTPATIENT MEDICATIONS:  Discharge Medication List as of 1/26/2023  9:59 PM        START taking these medications    Details   albuterol 108 (90 Base) MCG/ACT Aero Soln inhalation aerosol Inhale 2 Puffs every 6 hours as needed for Shortness of Breath., Disp-8.5 g, R-0, Normal      benzonatate (TESSALON) 100 MG Cap Take 1 Capsule by mouth 3 times a day as needed for Cough., Disp-60 Capsule, R-0, Normal             FINAL DIANGOSIS  1. Viral syndrome    2. Viral pharyngitis         Alistair AGUILA (Jae), am scribing for, and in the presence of, FAHAD Bell*.    Electronically signed by: Alistair Abraham (Jae), 1/26/2023    Jimym AGUILA M.* personally performed the services described in this documentation, as scribed by Alistair Abraham in my presence, and it is both accurate and complete.      The note accurately reflects work and decisions made by me.  Jimmy Montgomery M.D.  1/27/2023  1:35 AM

## 2023-01-27 NOTE — ED TRIAGE NOTES
Chief Complaint   Patient presents with    Sore Throat     Started 3 days ago    Body Aches     Started 3 days ago and can not sleep.     Pt A & 0 x 4, speech clear, ambulates well    Pt updated on triage process and asked to inform RN of any changes while waiting in lobby.

## 2023-01-27 NOTE — DISCHARGE INSTRUCTIONS
Ibuprofen and Tylenol for your sore throat, body aches and fever.  You can alternate Tylenol and ibuprofen every 3 hours.  You can use throat lozenges, throat sprays to help with the sore throat.  Gargling with salt water also will help.  Return the emergency department if you have difficulty breathing, productive cough or fever will not go down with Tylenol or ibuprofen.    Check MyChart for COVID and flu results    You likely have a viral illness and may have COVID-19. At this point we do not have your test results.Therefore, COVID-19 is not ruled out and you will have to check my chart in 24 - 36 hours. If you test positive you will need to remain in home quarantine until all three of the following are true:  You are 5 days from symptom onset,   your symptoms are improving   you have been fever free for at least  24 hours without taking any Tylenol or ibuprofen.  4.   you will have to wear a mask when around anyone else for 10 days from the onset of symptoms.  If you develop significant shortness of breath, meaning that it is difficult for you to walk even short distances without having to stop and catch your breath, or you become severely dizzy and this is persistent then please return to the emergency department.    I recommend you get a home pulse oximeter.  Return if your oxygenation is less than 90.

## 2023-02-05 ENCOUNTER — HOSPITAL ENCOUNTER (EMERGENCY)
Facility: MEDICAL CENTER | Age: 38
End: 2023-02-05
Attending: EMERGENCY MEDICINE
Payer: MEDICAID

## 2023-02-05 VITALS
OXYGEN SATURATION: 97 % | RESPIRATION RATE: 18 BRPM | WEIGHT: 187.83 LBS | DIASTOLIC BLOOD PRESSURE: 89 MMHG | HEART RATE: 109 BPM | HEIGHT: 68 IN | BODY MASS INDEX: 28.47 KG/M2 | TEMPERATURE: 98 F | SYSTOLIC BLOOD PRESSURE: 134 MMHG

## 2023-02-05 DIAGNOSIS — G43.109 MIGRAINE WITH AURA AND WITHOUT STATUS MIGRAINOSUS, NOT INTRACTABLE: ICD-10-CM

## 2023-02-05 PROCEDURE — 99283 EMERGENCY DEPT VISIT LOW MDM: CPT

## 2023-02-05 PROCEDURE — 700111 HCHG RX REV CODE 636 W/ 250 OVERRIDE (IP): Performed by: EMERGENCY MEDICINE

## 2023-02-05 PROCEDURE — A9270 NON-COVERED ITEM OR SERVICE: HCPCS | Performed by: EMERGENCY MEDICINE

## 2023-02-05 PROCEDURE — 700102 HCHG RX REV CODE 250 W/ 637 OVERRIDE(OP): Performed by: EMERGENCY MEDICINE

## 2023-02-05 RX ORDER — SUMATRIPTAN 50 MG/1
50 TABLET, FILM COATED ORAL
Qty: 10 TABLET | Refills: 1 | Status: SHIPPED | OUTPATIENT
Start: 2023-02-05 | End: 2023-07-10

## 2023-02-05 RX ORDER — SUMATRIPTAN 25 MG/1
50 TABLET, FILM COATED ORAL ONCE
Status: COMPLETED | OUTPATIENT
Start: 2023-02-05 | End: 2023-02-05

## 2023-02-05 RX ORDER — ONDANSETRON 4 MG/1
4 TABLET, ORALLY DISINTEGRATING ORAL ONCE
Status: COMPLETED | OUTPATIENT
Start: 2023-02-05 | End: 2023-02-05

## 2023-02-05 RX ORDER — ONDANSETRON 4 MG/1
4 TABLET, ORALLY DISINTEGRATING ORAL EVERY 6 HOURS PRN
Qty: 10 TABLET | Refills: 0 | Status: SHIPPED | OUTPATIENT
Start: 2023-02-05 | End: 2023-07-10

## 2023-02-05 RX ADMIN — ONDANSETRON 4 MG: 4 TABLET, ORALLY DISINTEGRATING ORAL at 21:34

## 2023-02-05 RX ADMIN — SUMATRIPTAN SUCCINATE 50 MG: 25 TABLET ORAL at 21:34

## 2023-02-05 ASSESSMENT — FIBROSIS 4 INDEX: FIB4 SCORE: 0.73

## 2023-02-05 NOTE — Clinical Note
Олег James was seen and treated in our emergency department on 2/5/2023.  He may return to work on 02/08/2023.       If you have any questions or concerns, please don't hesitate to call.      Keren Newman M.D.

## 2023-02-06 ENCOUNTER — TELEPHONE (OUTPATIENT)
Dept: HEALTH INFORMATION MANAGEMENT | Facility: OTHER | Age: 38
End: 2023-02-06
Payer: MEDICAID

## 2023-02-06 NOTE — ED PROVIDER NOTES
ED Provider Note    CHIEF COMPLAINT  Chief Complaint   Patient presents with    Headache     Reports migraine hx. Took Excedrin yesterday, denies PTA meds today. States h/a 1/10.       EXTERNAL RECORDS REVIEWED  Outpatient Notes reviewed emergency department note dated 2023 by Dr. Jimmy Lackey.  Patient seen for febrile illness, diagnosed with viral syndrome and viral pharyngitis and was safe for discharge on albuterol and Tessalon.    HPI/ROS  LIMITATION TO HISTORY   Select: : None  OUTSIDE HISTORIAN(S):  None available    Олег Ramirez is a 37 y.o. male who presents for evaluation of recurrent headache.  Patient describes chronic rather frequent migraines.   he was seen for a similar headache on the 12th of last month as well.  Patient notes symptoms more apparent today, pain is 1 out of 10, retro-orbital, associated visual aura.  No photophobia, no effect from noise, no head trauma.  He does not has had nausea and vomiting yesterday, no active vomiting today.  No fever.  Pain is similar in character to previous migraines.  He has not been assessed by neurology and does not currently have a primary care provider.  He has been taking Excedrin with no improvement today as such she came to be assessed.    PAST MEDICAL HISTORY   has a past medical history of GERD (gastroesophageal reflux disease) and Migraine.  History of sinus tachycardia    SURGICAL HISTORY   has a past surgical history that includes appendectomy laparoscopic (2018) and appendectomy.    FAMILY HISTORY  Family History   Problem Relation Age of Onset    Cancer Paternal Grandfather         esophageal    No Known Problems Mother     Diabetes Father     Sleep Apnea Maternal Grandmother     Sleep Apnea Paternal Grandmother    Migraine headache and grandmother    SOCIAL HISTORY  Social History     Tobacco Use    Smoking status: Former     Types: Cigarettes     Quit date: 3/23/2021     Years since quittin.8    Smokeless  "tobacco: Never    Tobacco comments:     5 cigs a day, pack a week previously.    Vaping Use    Vaping Use: Former    Substances: Nicotine   Substance and Sexual Activity    Alcohol use: Yes     Comment: occasionally    Drug use: No    Sexual activity: Yes     Partners: Female     Birth control/protection: Condom       CURRENT MEDICATIONS  Home Medications       Reviewed by Adrianna Burgess R.N. (Registered Nurse) on 02/05/23 at 2117  Med List Status: Not Addressed     Medication Last Dose Status   albuterol 108 (90 Base) MCG/ACT Aero Soln inhalation aerosol  Active   asa/apap/caffeine (EXCEDRIN) 250-250-65 MG Tab  Active   benzonatate (TESSALON) 100 MG Cap  Active   metoprolol tartrate (LOPRESSOR) 50 MG Tab  Active   metoprolol tartrate (LOPRESSOR) 50 MG Tab  Active   omeprazole (PRILOSEC) 20 MG delayed-release capsule  Active                    ALLERGIES  No Known Allergies    PHYSICAL EXAM  VITAL SIGNS: BP (!) 140/95   Pulse (!) 108   Temp 36.2 °C (97.2 °F) (Temporal)   Resp 18   Ht 1.727 m (5' 8\")   Wt 85.2 kg (187 lb 13.3 oz)   SpO2 95%   BMI 28.56 kg/m²    General: Alert, no acute distress  Skin: Warm, dry, normal for ethnicity  Head: Normocephalic, atraumatic  Neck: Trachea midline, no tenderness  Eye: PERRL, normal conjunctiva, extraocular movements intact  ENMT: Oral mucosa moist, no pharyngeal erythema or exudate  Cardiovascular: S1, S2, minimally tachycardic, otherwise regular rate and rhythm, No murmur, Normal peripheral perfusion  Respiratory: Lungs CTA, respirations are non-labored, breath sounds are equal  Musculoskeletal: No swelling, no deformity  Neurological: Alert and oriented to person, place, time, and situation.  Cranial nerves II through XII are grossly intact.  No upper nor lower extremity pronator drift.  Upper and lower extremity strength and sensation are 5 x 5 and symmetrical bilaterally.  Lymphatics: No cervical lymphadenopathy  Psychiatric: Cooperative, appropriate mood & affect "         COURSE & MEDICAL DECISION MAKING    ED Observation Status? No; Patient does not meet criteria for ED Observation.  Treated with sumatriptan 50 mg p.o.    INITIAL ASSESSMENT, COURSE AND PLAN  Care Narrative: This is a pleasant otherwise healthy 37-year-old male with history of sinus tachycardia who presents for evaluation of headache similar in character to his previous migraines.  Vomiting yesterday but none today.  He is tachycardic but this is his baseline, does not appear to be volume depleted, no indication for IV fluids as such.  Neurologic exam is reassuring with no deficits, he has an NIH stroke scale of 0.  Pain is 1 out of 10 at this time, patient otherwise is nontoxic and well in appearance.  This would not be consistent with subarachnoid hemorrhage, he describes a visual aura similar to previous migraine as well.  Risk of advanced neuroimaging was evaluated potential benefit acutely.  Patient comfortable with treatment with sumatriptan, will write for the same and he will follow-up with primary care as he relates he does not currently have a physician with whom to follow.  HTN/IDDM FOLLOW UP:  The patient is referred to a primary physician for blood pressure management, diabetic screening, and for all other preventive health concerns      ADDITIONAL PROBLEM LIST  Recurrent migraine headache  Sinus tachycardia  Nausea  Elevated blood pressure reading  DISPOSITION AND DISCUSSIONS  I have discussed management of the patient with the following physicians and TELLO's:  NA    Discussion of management with other QHP or appropriate source(s): None     Escalation of care considered, and ultimately not performed:IV fluids and diagnostic imaging    Barriers to care at this time, including but not limited to: Patient does not have established PCP.     Decision tools and prescription drugs considered including, but not limited to: NIH Stroke Scale 0 .    The patient will return for new or worsening symptoms and  is stable at the time of discharge.    Patient has had high blood pressure while in the emergency department, felt likely secondary to medical condition. Counseled patient to monitor blood pressure at home and follow up with primary care physician.      DISPOSITION:  Patient will be discharged home in stable condition.    FOLLOW UP:  Cheryl Delvalle M.D.  [address]  [place] [address]  [phone]    Schedule an appointment as soon as possible for a visit         OUTPATIENT MEDICATIONS:  New Prescriptions    ONDANSETRON (ZOFRAN ODT) 4 MG TABLET DISPERSIBLE    Take 1 Tablet by mouth every 6 hours as needed for Nausea/Vomiting.    SUMATRIPTAN (IMITREX) 50 MG TAB    Take 1 Tablet by mouth one time as needed for Migraine for up to 1 dose.          FINAL DIAGNOSIS  1. Migraine with aura and without status migrainosus, not intractable           Electronically signed by: Keren Newman M.D., 2/5/2023 9:29 PM

## 2023-02-17 ENCOUNTER — HOSPITAL ENCOUNTER (EMERGENCY)
Facility: MEDICAL CENTER | Age: 38
End: 2023-02-18
Attending: EMERGENCY MEDICINE
Payer: COMMERCIAL

## 2023-02-17 ENCOUNTER — APPOINTMENT (OUTPATIENT)
Dept: RADIOLOGY | Facility: MEDICAL CENTER | Age: 38
End: 2023-02-17
Attending: EMERGENCY MEDICINE
Payer: COMMERCIAL

## 2023-02-17 DIAGNOSIS — R09.89 AIR HUNGER: ICD-10-CM

## 2023-02-17 LAB
BASOPHILS # BLD AUTO: 0.7 % (ref 0–1.8)
BASOPHILS # BLD: 0.06 K/UL (ref 0–0.12)
EOSINOPHIL # BLD AUTO: 0.33 K/UL (ref 0–0.51)
EOSINOPHIL NFR BLD: 4.1 % (ref 0–6.9)
ERYTHROCYTE [DISTWIDTH] IN BLOOD BY AUTOMATED COUNT: 41.8 FL (ref 35.9–50)
HCT VFR BLD AUTO: 48.4 % (ref 42–52)
HGB BLD-MCNC: 16 G/DL (ref 14–18)
IMM GRANULOCYTES # BLD AUTO: 0.02 K/UL (ref 0–0.11)
IMM GRANULOCYTES NFR BLD AUTO: 0.2 % (ref 0–0.9)
LYMPHOCYTES # BLD AUTO: 2.38 K/UL (ref 1–4.8)
LYMPHOCYTES NFR BLD: 29.6 % (ref 22–41)
MCH RBC QN AUTO: 26.8 PG (ref 27–33)
MCHC RBC AUTO-ENTMCNC: 33.1 G/DL (ref 33.7–35.3)
MCV RBC AUTO: 80.9 FL (ref 81.4–97.8)
MONOCYTES # BLD AUTO: 0.88 K/UL (ref 0–0.85)
MONOCYTES NFR BLD AUTO: 10.9 % (ref 0–13.4)
NEUTROPHILS # BLD AUTO: 4.38 K/UL (ref 1.82–7.42)
NEUTROPHILS NFR BLD: 54.5 % (ref 44–72)
NRBC # BLD AUTO: 0 K/UL
NRBC BLD-RTO: 0 /100 WBC
PLATELET # BLD AUTO: 338 K/UL (ref 164–446)
PMV BLD AUTO: 9.6 FL (ref 9–12.9)
RBC # BLD AUTO: 5.98 M/UL (ref 4.7–6.1)
WBC # BLD AUTO: 8.1 K/UL (ref 4.8–10.8)

## 2023-02-17 PROCEDURE — 80053 COMPREHEN METABOLIC PANEL: CPT

## 2023-02-17 PROCEDURE — 71045 X-RAY EXAM CHEST 1 VIEW: CPT

## 2023-02-17 PROCEDURE — 84443 ASSAY THYROID STIM HORMONE: CPT

## 2023-02-17 PROCEDURE — 99283 EMERGENCY DEPT VISIT LOW MDM: CPT

## 2023-02-17 PROCEDURE — 36415 COLL VENOUS BLD VENIPUNCTURE: CPT

## 2023-02-17 PROCEDURE — 85379 FIBRIN DEGRADATION QUANT: CPT

## 2023-02-17 PROCEDURE — 83880 ASSAY OF NATRIURETIC PEPTIDE: CPT

## 2023-02-17 PROCEDURE — 84484 ASSAY OF TROPONIN QUANT: CPT

## 2023-02-17 PROCEDURE — 93005 ELECTROCARDIOGRAM TRACING: CPT | Performed by: EMERGENCY MEDICINE

## 2023-02-17 PROCEDURE — 85025 COMPLETE CBC W/AUTO DIFF WBC: CPT

## 2023-02-17 ASSESSMENT — FIBROSIS 4 INDEX: FIB4 SCORE: 0.73

## 2023-02-18 VITALS
HEIGHT: 69 IN | WEIGHT: 184.97 LBS | BODY MASS INDEX: 27.4 KG/M2 | RESPIRATION RATE: 21 BRPM | HEART RATE: 80 BPM | SYSTOLIC BLOOD PRESSURE: 133 MMHG | OXYGEN SATURATION: 98 % | TEMPERATURE: 98.4 F | DIASTOLIC BLOOD PRESSURE: 81 MMHG

## 2023-02-18 LAB
ALBUMIN SERPL BCP-MCNC: 4.4 G/DL (ref 3.2–4.9)
ALBUMIN/GLOB SERPL: 1.4 G/DL
ALP SERPL-CCNC: 78 U/L (ref 30–99)
ALT SERPL-CCNC: 42 U/L (ref 2–50)
ANION GAP SERPL CALC-SCNC: 13 MMOL/L (ref 7–16)
AST SERPL-CCNC: 33 U/L (ref 12–45)
BILIRUB SERPL-MCNC: 0.8 MG/DL (ref 0.1–1.5)
BUN SERPL-MCNC: 18 MG/DL (ref 8–22)
CALCIUM ALBUM COR SERPL-MCNC: 9.3 MG/DL (ref 8.5–10.5)
CALCIUM SERPL-MCNC: 9.6 MG/DL (ref 8.4–10.2)
CHLORIDE SERPL-SCNC: 106 MMOL/L (ref 96–112)
CO2 SERPL-SCNC: 23 MMOL/L (ref 20–33)
CREAT SERPL-MCNC: 0.96 MG/DL (ref 0.5–1.4)
D DIMER PPP IA.FEU-MCNC: <0.27 UG/ML (FEU) (ref 0–0.5)
EKG IMPRESSION: NORMAL
GFR SERPLBLD CREATININE-BSD FMLA CKD-EPI: 105 ML/MIN/1.73 M 2
GLOBULIN SER CALC-MCNC: 3.2 G/DL (ref 1.9–3.5)
GLUCOSE SERPL-MCNC: 93 MG/DL (ref 65–99)
NT-PROBNP SERPL IA-MCNC: 10 PG/ML (ref 0–125)
POTASSIUM SERPL-SCNC: 4.3 MMOL/L (ref 3.6–5.5)
PROT SERPL-MCNC: 7.6 G/DL (ref 6–8.2)
SODIUM SERPL-SCNC: 142 MMOL/L (ref 135–145)
TROPONIN T SERPL-MCNC: 10 NG/L (ref 6–19)
TSH SERPL DL<=0.005 MIU/L-ACNC: 0.85 UIU/ML (ref 0.38–5.33)

## 2023-02-18 NOTE — ED TRIAGE NOTES
"Pt comes in by himself  vague complaint of SOB for several months now  unknown cause  no Hx of such  \"Just feels like I cant catch my breath\"  pt's RA sat 97%  speaking in full sentences w/out distress  no other complaints   "

## 2023-02-18 NOTE — ED PROVIDER NOTES
ED Provider Note    CHIEF COMPLAINT  Chief Complaint   Patient presents with    Shortness of Breath     For the last few months   unknown cause   no Hx of lung issues           HPI/ROS  LIMITATION TO HISTORY   Select: : None      Олег Ramirez is a 37 y.o. male who presents with chief complaint of shortness of breath.  Patient reports shortness of breath for the last few months.  Patient reports he has had shortness of breath now for around 3 months, he reports that he feels like he just cannot catch his breath.  He reports this is chronic, and ongoing.  He reports he rarely ever attempts to exert himself but believes he may have had some decreased exertional capacity but is not quite sure.  Patient denies any associated lower extremity edema or orthopnea.  Patient denies any associated fevers or chills.  Patient denies any recent calf pain or calf swelling.  He denies any history of blood clots.  Patient denies any history of drug abuse or alcoholism.  Patient reports he used to smoke years ago.  Patient has a family history of diabetes but no other history of VTE, or CAD that he knows of.  Denies any hemoptysis  denies surgery, fracture or casted extremity within the last mo  denies use of estrogen containing OCP  denies hx of VTE. denies active or recently active ca      PAST MEDICAL HISTORY   has a past medical history of GERD (gastroesophageal reflux disease) and Migraine.    SURGICAL HISTORY   has a past surgical history that includes appendectomy laparoscopic (2018) and appendectomy.    FAMILY HISTORY  Family History   Problem Relation Age of Onset    Cancer Paternal Grandfather         esophageal    No Known Problems Mother     Diabetes Father     Sleep Apnea Maternal Grandmother     Sleep Apnea Paternal Grandmother        SOCIAL HISTORY  Social History     Tobacco Use    Smoking status: Former     Types: Cigarettes     Quit date: 3/23/2021     Years since quittin.9    Smokeless tobacco:  "Never    Tobacco comments:     5 cigs a day, pack a week previously.    Vaping Use    Vaping Use: Former    Substances: Nicotine   Substance and Sexual Activity    Alcohol use: Yes     Comment: occasionally    Drug use: No    Sexual activity: Yes     Partners: Female     Birth control/protection: Condom       CURRENT MEDICATIONS  Home Medications       Reviewed by Esperanza Keys R.N. (Registered Nurse) on 02/17/23 at 2213  Med List Status: Partial     Medication Last Dose Status   albuterol 108 (90 Base) MCG/ACT Aero Soln inhalation aerosol  Active   asa/apap/caffeine (EXCEDRIN) 250-250-65 MG Tab  Active   benzonatate (TESSALON) 100 MG Cap  Active   metoprolol tartrate (LOPRESSOR) 50 MG Tab  Active   metoprolol tartrate (LOPRESSOR) 50 MG Tab  Active   omeprazole (PRILOSEC) 20 MG delayed-release capsule  Active   ondansetron (ZOFRAN ODT) 4 MG TABLET DISPERSIBLE  Active   SUMAtriptan (IMITREX) 50 MG Tab  Active                    ALLERGIES  No Known Allergies    PHYSICAL EXAM  VITAL SIGNS: Pulse (!) 108   Temp 36.1 °C (97 °F) (Temporal)   Resp 16   Ht 1.753 m (5' 9\")   Wt 83.9 kg (184 lb 15.5 oz)   SpO2 96%   BMI 27.31 kg/m²    Physical Exam  Constitutional:       Appearance: He is well-developed.   HENT:      Head: Normocephalic.   Eyes:      Extraocular Movements: Extraocular movements intact.      Pupils: Pupils are equal, round, and reactive to light.   Cardiovascular:      Rate and Rhythm: Regular rhythm. Tachycardia present.   Pulmonary:      Effort: Pulmonary effort is normal.      Breath sounds: Normal breath sounds. No decreased breath sounds, wheezing, rhonchi or rales.   Chest:      Chest wall: No mass or deformity.   Abdominal:      General: Bowel sounds are normal.      Palpations: Abdomen is soft.      Tenderness: There is no abdominal tenderness.   Musculoskeletal:      Cervical back: Normal range of motion.      Right lower leg: No edema.      Left lower leg: No edema.   Skin:     General: " Skin is warm.      Capillary Refill: Capillary refill takes less than 2 seconds.   Neurological:      General: No focal deficit present.      Mental Status: He is alert and oriented to person, place, and time.   Psychiatric:         Mood and Affect: Mood is anxious.         DIAGNOSTIC STUDIES / PROCEDURES  EKG  I have independently interpreted this EKG  See below    LABS  Results for orders placed or performed during the hospital encounter of 23   CBC WITH DIFFERENTIAL   Result Value Ref Range    WBC 8.1 4.8 - 10.8 K/uL    RBC 5.98 4.70 - 6.10 M/uL    Hemoglobin 16.0 14.0 - 18.0 g/dL    Hematocrit 48.4 42.0 - 52.0 %    MCV 80.9 (L) 81.4 - 97.8 fL    MCH 26.8 (L) 27.0 - 33.0 pg    MCHC 33.1 (L) 33.7 - 35.3 g/dL    RDW 41.8 35.9 - 50.0 fL    Platelet Count 338 164 - 446 K/uL    MPV 9.6 9.0 - 12.9 fL    Neutrophils-Polys 54.50 44.00 - 72.00 %    Lymphocytes 29.60 22.00 - 41.00 %    Monocytes 10.90 0.00 - 13.40 %    Eosinophils 4.10 0.00 - 6.90 %    Basophils 0.70 0.00 - 1.80 %    Immature Granulocytes 0.20 0.00 - 0.90 %    Nucleated RBC 0.00 /100 WBC    Neutrophils (Absolute) 4.38 1.82 - 7.42 K/uL    Lymphs (Absolute) 2.38 1.00 - 4.80 K/uL    Monos (Absolute) 0.88 (H) 0.00 - 0.85 K/uL    Eos (Absolute) 0.33 0.00 - 0.51 K/uL    Baso (Absolute) 0.06 0.00 - 0.12 K/uL    Immature Granulocytes (abs) 0.02 0.00 - 0.11 K/uL    NRBC (Absolute) 0.00 K/uL   D-DIMER   Result Value Ref Range    D-Dimer <0.27 0.00 - 0.50 ug/mL (FEU)   TSH WITH REFLEX TO FT4   Result Value Ref Range    TSH 0.847 0.380 - 5.330 uIU/mL   EKG   Result Value Ref Range    Report       [facility] Emergency Dept.    Test Date:  2023  Pt Name:    TRE VIRK               Department: EDS  MRN:        [id number]                      [facility]  Gender:     Male                         Technician: YOSI  :        1985                   Requested By:SANJAY SCOTT  Order #:    [id number]                     Reading MD:    Measurements  Intervals                                Axis  Rate:       99                           P:          27  AL:         126                          QRS:        -9  QRSD:       104                          T:          23  QT:         368  QTc:        473    Interpretive Statements  Sinus rhythm  Borderline prolonged QT interval  Compared to ECG 01/12/2023 02:47:01  Sinus tachycardia no longer present           RADIOLOGY  I have independently interpreted the diagnostic imaging associated with this visit and am waiting the final reading from the radiologist.   My preliminary interpretation is a follows: X-ray is unremarkable  Radiologist interpretation:   DX-CHEST-PORTABLE (1 VIEW)   Final Result         1.  No acute cardiopulmonary disease.            COURSE & MEDICAL DECISION MAKING    ED Observation Status? Yes; I am placing the patient in to an observation status due to a diagnostic uncertainty as well as therapeutic intensity. Patient placed in observation status at 12:51 AM, 2/18/2023.     Observation plan is as follows: Laboratory results considerably delayed, awaiting results, patient signed out to my partner for this      INITIAL ASSESSMENT, COURSE AND PLAN  Care Narrative:   Patient here with no major medical problems complaining of mild decreased exertional tolerance and shortness of breath.  He is very well-appearing here.  He is mildly tachycardic ranging between 90s to low 100's.  Certainly this could be secondary to anxiety as patient is anxious on my exam however given patient's questionable exertional decrease will work-up for possible organic cause.  Will check TSH, and CBC to ensure the patient is not anemic or with a thyroid disorder causing high output heart failure.  Will check BNP though my suspicion of heart failure is relatively low here.  We will also check D-dimer the patient without any immediate risk factor; was criteria is low  Patient D-dimer is normal.  Patient  basic labs are very reassuring.  Patient's EKG is entirely unremarkable.  Patient chemistry, troponin and TSH are all pending, patient will be signed out to my partner for follow-up on these labs.  If these are normal patient be discharged home and instructed to follow-up with his primary care physician.  Patient with symptoms for far greater than 6 hours a, I do not believe that a delta troponin currently indicated and will necessarily improve sensitivity.  Patient's heart score would be less than 4 if his troponin is normal      DISPOSITION AND DISCUSSIONS      Decision tools and prescription drugs considered including, but not limited to: Heart score, Wells    FINAL DIAGNOSIS  Air hunger

## 2023-02-18 NOTE — DISCHARGE INSTRUCTIONS
We did a relatively extensive work-up.  Your work-up today was very reassuring.  Your heart enzyme was normal, your EKG was reassuring, your chest x-ray was normal, it is important that you follow-up with your primary care physician for ongoing work-up.

## 2023-07-10 ENCOUNTER — OFFICE VISIT (OUTPATIENT)
Dept: URGENT CARE | Facility: PHYSICIAN GROUP | Age: 38
End: 2023-07-10

## 2023-07-10 ENCOUNTER — APPOINTMENT (OUTPATIENT)
Dept: URGENT CARE | Facility: PHYSICIAN GROUP | Age: 38
End: 2023-07-10
Payer: MEDICAID

## 2023-07-10 VITALS
DIASTOLIC BLOOD PRESSURE: 62 MMHG | BODY MASS INDEX: 27.4 KG/M2 | OXYGEN SATURATION: 96 % | WEIGHT: 185 LBS | HEIGHT: 69 IN | TEMPERATURE: 98 F | HEART RATE: 90 BPM | SYSTOLIC BLOOD PRESSURE: 124 MMHG | RESPIRATION RATE: 18 BRPM

## 2023-07-10 ASSESSMENT — FIBROSIS 4 INDEX: FIB4 SCORE: 0.53

## 2023-10-16 ENCOUNTER — APPOINTMENT (OUTPATIENT)
Dept: RADIOLOGY | Facility: MEDICAL CENTER | Age: 38
End: 2023-10-16
Attending: EMERGENCY MEDICINE
Payer: MEDICAID

## 2023-10-16 ENCOUNTER — HOSPITAL ENCOUNTER (EMERGENCY)
Facility: MEDICAL CENTER | Age: 38
End: 2023-10-16
Attending: EMERGENCY MEDICINE
Payer: MEDICAID

## 2023-10-16 VITALS
HEIGHT: 69 IN | OXYGEN SATURATION: 94 % | TEMPERATURE: 97 F | RESPIRATION RATE: 23 BRPM | WEIGHT: 185 LBS | HEART RATE: 107 BPM | SYSTOLIC BLOOD PRESSURE: 128 MMHG | DIASTOLIC BLOOD PRESSURE: 84 MMHG | BODY MASS INDEX: 27.4 KG/M2

## 2023-10-16 DIAGNOSIS — R06.02 SHORTNESS OF BREATH: ICD-10-CM

## 2023-10-16 LAB
ALBUMIN SERPL BCP-MCNC: 4.7 G/DL (ref 3.2–4.9)
ALBUMIN/GLOB SERPL: 1.7 G/DL
ALP SERPL-CCNC: 65 U/L (ref 30–99)
ALT SERPL-CCNC: 25 U/L (ref 2–50)
ANION GAP SERPL CALC-SCNC: 10 MMOL/L (ref 7–16)
AST SERPL-CCNC: 19 U/L (ref 12–45)
BASOPHILS # BLD AUTO: 0.7 % (ref 0–1.8)
BASOPHILS # BLD: 0.06 K/UL (ref 0–0.12)
BILIRUB SERPL-MCNC: 0.9 MG/DL (ref 0.1–1.5)
BUN SERPL-MCNC: 13 MG/DL (ref 8–22)
CALCIUM ALBUM COR SERPL-MCNC: 9 MG/DL (ref 8.5–10.5)
CALCIUM SERPL-MCNC: 9.6 MG/DL (ref 8.5–10.5)
CHLORIDE SERPL-SCNC: 107 MMOL/L (ref 96–112)
CO2 SERPL-SCNC: 25 MMOL/L (ref 20–33)
CREAT SERPL-MCNC: 1.13 MG/DL (ref 0.5–1.4)
EKG IMPRESSION: NORMAL
EOSINOPHIL # BLD AUTO: 0.29 K/UL (ref 0–0.51)
EOSINOPHIL NFR BLD: 3.4 % (ref 0–6.9)
ERYTHROCYTE [DISTWIDTH] IN BLOOD BY AUTOMATED COUNT: 40.2 FL (ref 35.9–50)
GFR SERPLBLD CREATININE-BSD FMLA CKD-EPI: 86 ML/MIN/1.73 M 2
GLOBULIN SER CALC-MCNC: 2.8 G/DL (ref 1.9–3.5)
GLUCOSE SERPL-MCNC: 86 MG/DL (ref 65–99)
HCT VFR BLD AUTO: 47.6 % (ref 42–52)
HGB BLD-MCNC: 16 G/DL (ref 14–18)
IMM GRANULOCYTES # BLD AUTO: 0.02 K/UL (ref 0–0.11)
IMM GRANULOCYTES NFR BLD AUTO: 0.2 % (ref 0–0.9)
LYMPHOCYTES # BLD AUTO: 2.72 K/UL (ref 1–4.8)
LYMPHOCYTES NFR BLD: 31.7 % (ref 22–41)
MCH RBC QN AUTO: 27.4 PG (ref 27–33)
MCHC RBC AUTO-ENTMCNC: 33.6 G/DL (ref 32.3–36.5)
MCV RBC AUTO: 81.4 FL (ref 81.4–97.8)
MONOCYTES # BLD AUTO: 0.91 K/UL (ref 0–0.85)
MONOCYTES NFR BLD AUTO: 10.6 % (ref 0–13.4)
NEUTROPHILS # BLD AUTO: 4.57 K/UL (ref 1.82–7.42)
NEUTROPHILS NFR BLD: 53.4 % (ref 44–72)
NRBC # BLD AUTO: 0 K/UL
NRBC BLD-RTO: 0 /100 WBC (ref 0–0.2)
PLATELET # BLD AUTO: 316 K/UL (ref 164–446)
PMV BLD AUTO: 9.7 FL (ref 9–12.9)
POTASSIUM SERPL-SCNC: 3.8 MMOL/L (ref 3.6–5.5)
PROT SERPL-MCNC: 7.5 G/DL (ref 6–8.2)
RBC # BLD AUTO: 5.85 M/UL (ref 4.7–6.1)
SODIUM SERPL-SCNC: 142 MMOL/L (ref 135–145)
WBC # BLD AUTO: 8.6 K/UL (ref 4.8–10.8)

## 2023-10-16 PROCEDURE — 99283 EMERGENCY DEPT VISIT LOW MDM: CPT

## 2023-10-16 PROCEDURE — 93005 ELECTROCARDIOGRAM TRACING: CPT | Performed by: EMERGENCY MEDICINE

## 2023-10-16 PROCEDURE — 80053 COMPREHEN METABOLIC PANEL: CPT

## 2023-10-16 PROCEDURE — 36415 COLL VENOUS BLD VENIPUNCTURE: CPT

## 2023-10-16 PROCEDURE — 71045 X-RAY EXAM CHEST 1 VIEW: CPT

## 2023-10-16 PROCEDURE — 85025 COMPLETE CBC W/AUTO DIFF WBC: CPT

## 2023-10-16 PROCEDURE — 93005 ELECTROCARDIOGRAM TRACING: CPT

## 2023-10-16 ASSESSMENT — FIBROSIS 4 INDEX: FIB4 SCORE: 0.53

## 2023-10-16 NOTE — ED TRIAGE NOTES
"Chief Complaint   Patient presents with    Shortness of Breath     X 1 year - worse on exertion     Pt to triage for above complaint. Pt states for the last year he will be out of breath when he walks just a couple of feet. Protocol ordered.     Pt educated on triage process and placed in lobby.      BP (!) 135/98   Pulse (!) 115   Temp 36.2 °C (97.1 °F) (Temporal)   Resp 16   Ht 1.753 m (5' 9\")   Wt 83.9 kg (185 lb)   SpO2 94%       "

## 2023-10-16 NOTE — ED PROVIDER NOTES
ED Provider Note    CHIEF COMPLAINT  Chief Complaint   Patient presents with    Shortness of Breath     X 1 year - worse on exertion       EXTERNAL RECORDS REVIEWED  Outpatient cardiology 10/14/2021, palpitations and tachycardia    HPI/ROS    Олег Ramirez is a 37 y.o. male who presents for evaluation of shortness of breath.  This been going on for well over a year, multiple evaluations over the past couple years for the same, he has had ER work-ups as well as cardiology and PCP work-ups.  No etiology has been found.  He states that he is always short of breath but it gets worse with exertion.  At times he feels more short of breath.  He is a former cigarette smoker.  No chest pain.  No abdominal pain.  No back pain.  No other complaints.    PAST MEDICAL HISTORY   has a past medical history of GERD (gastroesophageal reflux disease) and Migraine.    SURGICAL HISTORY   has a past surgical history that includes appendectomy laparoscopic (2018) and appendectomy.    FAMILY HISTORY  Family History   Problem Relation Age of Onset    Cancer Paternal Grandfather         esophageal    No Known Problems Mother     Diabetes Father     Sleep Apnea Maternal Grandmother     Sleep Apnea Paternal Grandmother        SOCIAL HISTORY  Social History     Tobacco Use    Smoking status: Former     Current packs/day: 0.00     Types: Cigarettes     Quit date: 3/23/2021     Years since quittin.5    Smokeless tobacco: Never    Tobacco comments:     5 cigs a day, pack a week previously.    Vaping Use    Vaping Use: Every day    Substances: Nicotine   Substance and Sexual Activity    Alcohol use: Yes     Comment: occasionally    Drug use: No    Sexual activity: Yes     Partners: Female     Birth control/protection: Condom       CURRENT MEDICATIONS  Home Medications       Reviewed by Lillian Hare R.N. (Registered Nurse) on 10/16/23 at 1104  Med List Status: Not Addressed     Medication Last Dose Status   asa/apap/caffeine  "(EXCEDRIN) 250-250-65 MG Tab  Active   metoprolol tartrate (LOPRESSOR) 50 MG Tab  Active   omeprazole (PRILOSEC) 20 MG delayed-release capsule  Active                    ALLERGIES  No Known Allergies    PHYSICAL EXAM  VITAL SIGNS: BP (!) 135/93   Pulse (!) 101   Temp 36.2 °C (97.1 °F) (Temporal)   Resp 19   Ht 1.753 m (5' 9\")   Wt 83.9 kg (185 lb)   SpO2 93%   BMI 27.32 kg/m²    Constitutional: Well appearing patient in no acute distress.  Awake and alert, not toxic nor ill in appearance.  HENT: Normocephalic, no obvious evidence of acute trauma.  Eyes: No scleral icterus. Normal conjunctiva   Thorax & Lungs: Normal nonlabored respirations. I appreciate no wheezing, rhonchi or rales. There is normal air movement.  Upon cardiac ascultation I appreciate a regular heart rhythm and a tachycardic rate.  Abdomen: The abdomen is not visibly distended. Upon palpation, I find it to be without tenderness.  No mass appreciated.  Skin: The exposed portions of skin reveal no obvious rash or other abnormalities.  Extremities/Musculoskeletal: No obvious sign of acute trauma. No asymmetric calf tenderness or edema.   Neurologic: Alert & oriented. No focal deficits observed.      DIAGNOSTIC STUDIES / PROCEDURES    LABS  Results for orders placed or performed during the hospital encounter of 10/16/23   CBC with Differential   Result Value Ref Range    WBC 8.6 4.8 - 10.8 K/uL    RBC 5.85 4.70 - 6.10 M/uL    Hemoglobin 16.0 14.0 - 18.0 g/dL    Hematocrit 47.6 42.0 - 52.0 %    MCV 81.4 81.4 - 97.8 fL    MCH 27.4 27.0 - 33.0 pg    MCHC 33.6 32.3 - 36.5 g/dL    RDW 40.2 35.9 - 50.0 fL    Platelet Count 316 164 - 446 K/uL    MPV 9.7 9.0 - 12.9 fL    Neutrophils-Polys 53.40 44.00 - 72.00 %    Lymphocytes 31.70 22.00 - 41.00 %    Monocytes 10.60 0.00 - 13.40 %    Eosinophils 3.40 0.00 - 6.90 %    Basophils 0.70 0.00 - 1.80 %    Immature Granulocytes 0.20 0.00 - 0.90 %    Nucleated RBC 0.00 0.00 - 0.20 /100 WBC    Neutrophils " (Absolute) 4.57 1.82 - 7.42 K/uL    Lymphs (Absolute) 2.72 1.00 - 4.80 K/uL    Monos (Absolute) 0.91 (H) 0.00 - 0.85 K/uL    Eos (Absolute) 0.29 0.00 - 0.51 K/uL    Baso (Absolute) 0.06 0.00 - 0.12 K/uL    Immature Granulocytes (abs) 0.02 0.00 - 0.11 K/uL    NRBC (Absolute) 0.00 K/uL   Comp Metabolic Panel   Result Value Ref Range    Sodium 142 135 - 145 mmol/L    Potassium 3.8 3.6 - 5.5 mmol/L    Chloride 107 96 - 112 mmol/L    Co2 25 20 - 33 mmol/L    Anion Gap 10.0 7.0 - 16.0    Glucose 86 65 - 99 mg/dL    Bun 13 8 - 22 mg/dL    Creatinine 1.13 0.50 - 1.40 mg/dL    Calcium 9.6 8.5 - 10.5 mg/dL    Correct Calcium 9.0 8.5 - 10.5 mg/dL    AST(SGOT) 19 12 - 45 U/L    ALT(SGPT) 25 2 - 50 U/L    Alkaline Phosphatase 65 30 - 99 U/L    Total Bilirubin 0.9 0.1 - 1.5 mg/dL    Albumin 4.7 3.2 - 4.9 g/dL    Total Protein 7.5 6.0 - 8.2 g/dL    Globulin 2.8 1.9 - 3.5 g/dL    A-G Ratio 1.7 g/dL   ESTIMATED GFR   Result Value Ref Range    GFR (CKD-EPI) 86 >60 mL/min/1.73 m 2   EKG   Result Value Ref Range    Report       [facility] Emergency Dept.    Test Date:  2023-10-16  Pt Name:    TRE VIRK               Department: ER  MRN:        [id number]                      Room:  Gender:     Male                         Technician: [id number]  :        1985                   Requested By:ER TRIAGE PROTOCOL  Order #:    [id number]                    Reading MD: KALA MITCHELL MD    Measurements  Intervals                                Axis  Rate:       107                          P:          20  MA:         144                          QRS:        15  QRSD:       110                          T:          41  QT:         349  QTc:        466    Interpretive Statements  Sinus tachycardia  Compared to ECG 2023 23:43:34  Sinus rhythm no longer present  No ischemia  I independently interpreted this EKG  Electronically Signed On 10- 13:27:00 PDT by KALA MITCHELL MD          RADIOLOGY  I have  independently interpreted the diagnostic imaging associated with this visit and am waiting the final reading from the radiologist.   My preliminary interpretation is as follows: No infiltrate, no pneumothorax  Radiologist interpretation:   DX-CHEST-PORTABLE (1 VIEW)   Final Result      Bibasilar underinflation atelectasis            COURSE & MEDICAL DECISION MAKING    ED Observation Status? No; Patient does not meet criteria for ED Observation.     INITIAL ASSESSMENT, COURSE AND PLAN  Care Narrative: 37-year-old male with ongoing shortness of breath worsened with exertion, chronic for years, always tachycardic and has been evaluated by cardiology for his tachycardia.  Here his work-up is unremarkable.  X-ray reveals no pneumothorax or infiltrate.  His blood work is within normal limits.  In the past this year he has had a negative D-dimer multiple times.  He also has undergone CTA of the chest ruling out pulmonary embolism.  At this point, there is clearly no urgent emergent etiology to explain his symptoms.  He will follow-up with a pulmonologist perhaps he will get better answers there, I have referred him.  Strict return instructions provided, discharged home in stable condition    DISPOSITION AND DISCUSSIONS    Escalation of care considered, and ultimately not performed: I did consider escalation in care to inpatient although with reoccurring negative work-ups I think he is safe to pursue outpatient management    Decision tools and prescription drugs considered including, but not limited to: I did consider prescription of bronchodilators but at this point is not wheezing, he is not hypoxic, I do not want to add to his tachycardia.    FINAL DIAGNOSIS  1. Shortness of breath           Electronically signed by: Jordan Saez M.D., 10/16/2023 2:05 PM

## 2023-10-16 NOTE — Clinical Note
Олег James was seen and treated in our emergency department on 10/16/2023.  He may return to work on 10/17/2023.       If you have any questions or concerns, please don't hesitate to call.      Jordan Saez M.D.

## 2024-06-20 ENCOUNTER — HOSPITAL ENCOUNTER (EMERGENCY)
Facility: MEDICAL CENTER | Age: 39
End: 2024-06-21
Attending: STUDENT IN AN ORGANIZED HEALTH CARE EDUCATION/TRAINING PROGRAM

## 2024-06-20 DIAGNOSIS — K21.9 GASTROESOPHAGEAL REFLUX DISEASE, UNSPECIFIED WHETHER ESOPHAGITIS PRESENT: ICD-10-CM

## 2024-06-20 DIAGNOSIS — R45.89 ANXIOUS APPEARANCE: ICD-10-CM

## 2024-06-20 DIAGNOSIS — R00.0 CHRONIC TACHYCARDIA: ICD-10-CM

## 2024-06-20 DIAGNOSIS — R13.10 DYSPHAGIA, UNSPECIFIED TYPE: ICD-10-CM

## 2024-06-20 LAB — EKG IMPRESSION: NORMAL

## 2024-06-20 PROCEDURE — 93005 ELECTROCARDIOGRAM TRACING: CPT | Performed by: STUDENT IN AN ORGANIZED HEALTH CARE EDUCATION/TRAINING PROGRAM

## 2024-06-20 PROCEDURE — 99283 EMERGENCY DEPT VISIT LOW MDM: CPT

## 2024-06-20 PROCEDURE — 93005 ELECTROCARDIOGRAM TRACING: CPT

## 2024-06-20 ASSESSMENT — PAIN DESCRIPTION - PAIN TYPE: TYPE: ACUTE PAIN

## 2024-06-20 ASSESSMENT — FIBROSIS 4 INDEX: FIB4 SCORE: 0.43

## 2024-06-21 VITALS
BODY MASS INDEX: 28.51 KG/M2 | OXYGEN SATURATION: 92 % | DIASTOLIC BLOOD PRESSURE: 97 MMHG | WEIGHT: 192.46 LBS | HEART RATE: 115 BPM | SYSTOLIC BLOOD PRESSURE: 134 MMHG | HEIGHT: 69 IN | RESPIRATION RATE: 18 BRPM | TEMPERATURE: 99 F

## 2024-06-21 PROCEDURE — 99283 EMERGENCY DEPT VISIT LOW MDM: CPT

## 2024-06-21 PROCEDURE — A9270 NON-COVERED ITEM OR SERVICE: HCPCS | Performed by: STUDENT IN AN ORGANIZED HEALTH CARE EDUCATION/TRAINING PROGRAM

## 2024-06-21 PROCEDURE — 700102 HCHG RX REV CODE 250 W/ 637 OVERRIDE(OP): Performed by: STUDENT IN AN ORGANIZED HEALTH CARE EDUCATION/TRAINING PROGRAM

## 2024-06-21 RX ADMIN — LIDOCAINE HYDROCHLORIDE 30 ML: 20 SOLUTION ORAL; TOPICAL at 00:05

## 2024-06-21 NOTE — ED NOTES
Patient provided discharge instructions, verbalizes understanding and denies any further questions. Patient instructed to follow up with gastroenterology and return if condition worsens. No distress noted. Patient ambulated to the front lobby with a steady gait and all belongings.

## 2024-06-21 NOTE — ED PROVIDER NOTES
ER Provider Note    Scribed for Dr. Linwood Arevalo MD. by Susan Alicea. 6/20/2024  11:17 PM    Primary Care Provider: Jessy Rudolph M.D.    CHIEF COMPLAINT  Chief Complaint   Patient presents with    Rib Pain     Rt side       EXTERNAL RECORDS REVIEWED  Outpatient Notes Patient seen at pulmonary critical care 3/7/2024 for interstitial lung disease with CT chest being done at this time.    HPI/ROS  LIMITATION TO HISTORY   Select: : None    OUTSIDE HISTORIAN(S):  Significant other with the patient and reports the patient has a high heart rate.    Олег Ramirez is a 38 y.o. male who presents to the ED for evaluation of acid reflux onset two months ago. Patient reports that his grandfather passed away from cancer caused by acid reflux, in his esophagus. This concerned the patient as lately he has been having intermittent right sided rib pain that is exacerbated when eating. He reports maintaining the heartburn and associated sensations, but has been having frequent flare ups even after hours of eating which he is concerned may be due to ulcers. He denies seeing a GI doctor. He notes being diagnosed with acid reflux ten years ago. Patient denies difficulty with swallowing. There are no known alleviating or exacerbating factors.  Patient notes he takes over the counter 20 mg omeprazole once a day.    PAST MEDICAL HISTORY  Past Medical History:   Diagnosis Date    GERD (gastroesophageal reflux disease)     Migraine        SURGICAL HISTORY  Past Surgical History:   Procedure Laterality Date    APPENDECTOMY LAPAROSCOPIC  9/14/2018    Procedure: APPENDECTOMY LAPAROSCOPIC;  Surgeon: Baljit Mondragon M.D.;  Location: SURGERY [facility];  Service: General    APPENDECTOMY         FAMILY HISTORY  Family History   Problem Relation Age of Onset    Cancer Paternal Grandfather         esophageal    No Known Problems Mother     Diabetes Father     Sleep Apnea Maternal Grandmother     Sleep Apnea Paternal  "Grandmother        SOCIAL HISTORY   reports that he quit smoking about 3 years ago. His smoking use included cigarettes. He has never used smokeless tobacco. He reports current alcohol use. He reports that he does not use drugs.    CURRENT MEDICATIONS  Previous Medications    ASA/APAP/CAFFEINE (EXCEDRIN) 250-250-65 MG TAB    Take 2 Tablets by mouth every 6 hours as needed for Headache.    METOPROLOL TARTRATE (LOPRESSOR) 50 MG TAB    Take 1 Tablet by mouth 2 times a day.    OMEPRAZOLE (PRILOSEC) 20 MG DELAYED-RELEASE CAPSULE    Take 40 mg by mouth every evening.       ALLERGIES  Patient has no known allergies.    PHYSICAL EXAM  BP (!) 154/93   Pulse (!) 135   Temp 36.7 °C (98 °F) (Temporal)   Resp 18   Ht 1.753 m (5' 9\")   Wt 87.3 kg (192 lb 7.4 oz)   SpO2 96%   BMI 28.42 kg/m²   Physical Exam  Vitals and nursing note reviewed.   Constitutional:       Appearance: He is well-developed.   HENT:      Head: Normocephalic.   Cardiovascular:      Rate and Rhythm: Regular rhythm. Tachycardia present.      Heart sounds: No murmur heard.  Pulmonary:      Effort: Pulmonary effort is normal.      Breath sounds: Normal breath sounds. No wheezing, rhonchi or rales.   Abdominal:      Palpations: Abdomen is soft.      Tenderness: There is no abdominal tenderness. There is no guarding or rebound.   Musculoskeletal:         General: Normal range of motion.      Right lower leg: No edema.      Left lower leg: No edema.   Skin:     General: Skin is warm.   Neurological:      General: No focal deficit present.      Mental Status: He is alert and oriented to person, place, and time.       DIAGNOSTIC STUDIES & PROCEDURES    EKG:   I have independently interpreted this EKG   Results for orders placed or performed during the hospital encounter of 06/20/24   EKG   Result Value Ref Range    Report       [facility] Emergency Dept.    Test Date:  2024-06-20  Pt Name:    TRE VIRK               Department: " ER  MRN:        [id number]                      Room:  Gender:     Male                         Technician: [id number]  :        1985                   Requested By:ER TRIAGE PROTOCOL  Order #:    [id number]                    Reading MD: Linwood Arevalo    Measurements  Intervals                                Axis  Rate:       130                          P:          21  KS:         122                          QRS:        -20  QRSD:       99                           T:          30  QT:         324  QTc:        477    Interpretive Statements  Sinus tachycardia  Borderline left axis deviation  Borderline prolonged QT interval  Compared to ECG 10/16/2023 11:05:11  No significant changes  Electronically Signed On 2024 23:17:14 PDT by Linwood Arevalo       COURSE & MEDICAL DECISION MAKING    INITIAL ASSESSMENT AND PLAN  Care Narrative:       11:17 PM - Patient seen and evaluated at bedside.  38-year-old male presenting with concern for potential esophageal cancer given longstanding history of GERD which has been managed with omeprazole.  Patient also noting right-sided rib/abdominal pain ongoing for the past 2 weeks, intermittently that is not present at this time.  He appears well on exam he is persistently tachycardic in the 120s to the 130s.  The patient reports a history of this and says this is his baseline heart rate.  He has been seen by cardiology multiple times for this.  He also does admit to being particularly anxious about his concern for esophageal cancer and that that is likely contributing to his heart rate.  Chart review does reveal chronic tachycardia for the patient ranging between 101 into the 130s.  Since the patient is well-appearing and otherwise denying symptoms of chest pain, shortness of breath, syncope I do not feel further workup would be beneficial at this time.  Will give the patient a GI cocktail and reassess     - The patient informs me their pain feels improved following medication  administration. Discussed plan for GI referral for further evaluation. Noted the patient's exam and vitals at this time are reassuring. Discussed plan for discharge; I advised the patient to return to the [facility] ED with any new or worsening symptoms. Patient was given the opportunity to ask any questions. I addressed all questions or concerns and the patient is stable for discharge at this time. Patient verbalizes understanding and agreement to this plan of care.        ADDITIONAL PROBLEM LIST AND DISPOSITION  Past Medical History:   Diagnosis Date    GERD (gastroesophageal reflux disease)     Migraine                   DISPOSITION AND DISCUSSIONS  I have discussed management of the patient with the following physicians and TELLO's: None    Discussion of management with other QHP or appropriate source(s): None     Escalation of care considered, and ultimately not performed: acute inpatient care management, however at this time, the patient is most appropriate for outpatient management.    Barriers to care at this time, including but not limited to:  None .     Decision tools and prescription drugs considered including, but not limited to: None.    The patient will return for new or worsening symptoms and is stable at the time of discharge.    The patient is referred to a primary physician for blood pressure management, diabetic screening, and for all other preventative health concerns.    DISPOSITION:  Patient will be discharged home in stable condition.    FOLLOW UP:  Lilia Bello M.D.  [address] [place] [id number] [phone]    Schedule an appointment as soon as possible for a visit       [facility], Emergency Dept  [address] [facility] [id number] [phone]          OUTPATIENT MEDICATIONS:  Discharge Medication List as of 6/21/2024 12:08 AM         FINAL IMPRESSION   1. Gastroesophageal reflux disease, unspecified whether esophagitis present    2. Chronic  tachycardia    3. Anxious appearance    4. Dysphagia, unspecified type         I, Susan Alicea (Scribe), am scribing for, and in the presence of, Linwood Arevalo M.D..    Electronically signed by: Susan Alicea (Scribe), 6/20/2024    ILinwood M.D. personally performed the services described in this documentation, as scribed by Susan Alicea in my presence, and it is both accurate and complete.    The note accurately reflects work and decisions made by me.  Linwood Arevalo M.D.  6/21/2024  1:49 AM

## 2024-06-21 NOTE — ED TRIAGE NOTES
Pt ambulated into triage with c/o Acid reflux, and rt side under rib pain. Pt is tachycardic and is worried he has cancer of the esophagus. Pt sts his grandfather passed away from cancer caused from acid reflux. Pt sts he suffers from acid reflux and the pain has been ongoing x's 1-2 months. Pt denies any injury. Pt is A&O.

## 2024-08-26 ENCOUNTER — NON-PROVIDER VISIT (OUTPATIENT)
Dept: URGENT CARE | Facility: PHYSICIAN GROUP | Age: 39
End: 2024-08-26

## 2024-08-26 DIAGNOSIS — Z02.1 PRE-EMPLOYMENT DRUG SCREENING: ICD-10-CM

## 2024-08-26 LAB
AMP AMPHETAMINE: NORMAL
COC COCAINE: NORMAL
INT CON NEG: NORMAL
INT CON POS: NORMAL
MET METHAMPHETAMINES: NORMAL
OPI OPIATES: NORMAL
PCP PHENCYCLIDINE: NORMAL
POC DRUG COMMENT 753798-OCCUPATIONAL HEALTH: NEGATIVE
THC: NORMAL

## 2024-08-26 PROCEDURE — 80305 DRUG TEST PRSMV DIR OPT OBS: CPT | Performed by: NURSE PRACTITIONER

## 2024-09-20 ENCOUNTER — OFFICE VISIT (OUTPATIENT)
Dept: URGENT CARE | Facility: PHYSICIAN GROUP | Age: 39
End: 2024-09-20
Payer: MEDICAID

## 2024-09-20 VITALS
DIASTOLIC BLOOD PRESSURE: 90 MMHG | HEIGHT: 69 IN | SYSTOLIC BLOOD PRESSURE: 162 MMHG | TEMPERATURE: 97.9 F | OXYGEN SATURATION: 95 % | RESPIRATION RATE: 20 BRPM | HEART RATE: 87 BPM | WEIGHT: 188 LBS | BODY MASS INDEX: 27.85 KG/M2

## 2024-09-20 DIAGNOSIS — K04.7 DENTAL INFECTION: ICD-10-CM

## 2024-09-20 DIAGNOSIS — R03.0 ELEVATED BLOOD PRESSURE READING: ICD-10-CM

## 2024-09-20 PROCEDURE — 3077F SYST BP >= 140 MM HG: CPT | Performed by: FAMILY MEDICINE

## 2024-09-20 PROCEDURE — 3080F DIAST BP >= 90 MM HG: CPT | Performed by: FAMILY MEDICINE

## 2024-09-20 PROCEDURE — 99214 OFFICE O/P EST MOD 30 MIN: CPT | Mod: 25 | Performed by: FAMILY MEDICINE

## 2024-09-20 RX ORDER — IBUPROFEN 200 MG
600 TABLET ORAL ONCE
Status: COMPLETED | OUTPATIENT
Start: 2024-09-20 | End: 2024-09-20

## 2024-09-20 RX ADMIN — Medication 600 MG: at 10:23

## 2024-09-20 ASSESSMENT — FIBROSIS 4 INDEX: FIB4 SCORE: 0.43

## 2024-09-20 NOTE — PROGRESS NOTES
"  Subjective:      38 y.o. male presents to urgent care for left upper jaw and teeth pain that started 3 days ago. There was no inciting event or trauma at that time. The pain is constant, it's described as intense dull, currently rated 8/10. He has tried excedrin with moderate relief in symptoms. The last time he went to a dentist was greater than 5 years ago.    Blood pressure is elevated today in urgent care.  He does have a history of this for which he takes metoprolol.  He denies any chest pain, palpitations, or shortness of breath.    He denies any other questions or concerns at this time.    Current problem list, medication, and past medical/surgical history were reviewed in Epic.    ROS  See HPI     Objective:      BP (!) 162/90   Pulse 87   Temp 36.6 °C (97.9 °F) (Temporal)   Resp 20   Ht 1.753 m (5' 9\")   Wt 85.3 kg (188 lb)   SpO2 95%   BMI 27.76 kg/m²     Physical Exam  Constitutional:       General: He is not in acute distress.     Appearance: He is not diaphoretic.   HENT:      Right Ear: Tympanic membrane, ear canal and external ear normal.      Left Ear: Tympanic membrane, ear canal and external ear normal.      Mouth/Throat:        Comments: Erythema and edema noted as marked  Cardiovascular:      Rate and Rhythm: Normal rate and regular rhythm.      Heart sounds: Normal heart sounds.   Pulmonary:      Effort: Pulmonary effort is normal. No respiratory distress.      Breath sounds: Normal breath sounds.   Neurological:      Mental Status: He is alert.   Psychiatric:         Mood and Affect: Affect normal.         Judgment: Judgment normal.       Assessment/Plan:     1. Dental infection  Patient was given a dose of ibuprofen in clinic with good relief in symptoms.  Continue with Tylenol and ibuprofen as needed for pain.  Prescription for Augmentin has been sent.  He was encouraged to follow-up with a dentist as soon as possible.  - amoxicillin-clavulanate (AUGMENTIN) 875-125 MG Tab; Take 1 " Tablet by mouth 2 times a day for 7 days.  Dispense: 14 Tablet; Refill: 0  - ibuprofen (Motrin) tablet 600 mg    2. Elevated blood pressure reading  Systemic symptoms seen through elevated blood pressure.  Follow-up with PCP.      Instructed to return to Urgent Care or nearest Emergency Department if symptoms fail to improve, for any change in condition, further concerns, or new concerning symptoms. Patient states understanding of the plan of care and discharge instructions.    Kika Greco M.D.

## 2024-09-26 ENCOUNTER — NON-PROVIDER VISIT (OUTPATIENT)
Dept: URGENT CARE | Facility: PHYSICIAN GROUP | Age: 39
End: 2024-09-26

## 2024-09-26 DIAGNOSIS — Z02.1 PRE-EMPLOYMENT DRUG SCREENING: ICD-10-CM

## 2024-10-03 ENCOUNTER — OFFICE VISIT (OUTPATIENT)
Dept: URGENT CARE | Facility: PHYSICIAN GROUP | Age: 39
End: 2024-10-03
Payer: MEDICAID

## 2024-10-03 VITALS
RESPIRATION RATE: 16 BRPM | DIASTOLIC BLOOD PRESSURE: 82 MMHG | TEMPERATURE: 98 F | HEART RATE: 109 BPM | SYSTOLIC BLOOD PRESSURE: 126 MMHG | HEIGHT: 68 IN | BODY MASS INDEX: 28.34 KG/M2 | OXYGEN SATURATION: 98 % | WEIGHT: 187 LBS

## 2024-10-03 DIAGNOSIS — K08.89 TOOTH PAIN: ICD-10-CM

## 2024-10-03 DIAGNOSIS — K05.10 GINGIVITIS: ICD-10-CM

## 2024-10-03 PROCEDURE — 3079F DIAST BP 80-89 MM HG: CPT | Performed by: FAMILY MEDICINE

## 2024-10-03 PROCEDURE — 99214 OFFICE O/P EST MOD 30 MIN: CPT | Performed by: FAMILY MEDICINE

## 2024-10-03 PROCEDURE — 3074F SYST BP LT 130 MM HG: CPT | Performed by: FAMILY MEDICINE

## 2024-10-03 RX ORDER — CHLORHEXIDINE GLUCONATE ORAL RINSE 1.2 MG/ML
SOLUTION DENTAL
Qty: 118 ML | Refills: 0 | Status: SHIPPED | OUTPATIENT
Start: 2024-10-03

## 2024-10-03 RX ORDER — NAPROXEN 500 MG/1
500 TABLET ORAL 2 TIMES DAILY WITH MEALS
Qty: 10 TABLET | Refills: 0 | Status: SHIPPED | OUTPATIENT
Start: 2024-10-03

## 2024-10-03 ASSESSMENT — FIBROSIS 4 INDEX: FIB4 SCORE: 0.43

## 2024-10-11 ENCOUNTER — NON-PROVIDER VISIT (OUTPATIENT)
Dept: URGENT CARE | Facility: PHYSICIAN GROUP | Age: 39
End: 2024-10-11

## 2024-10-11 DIAGNOSIS — Z02.1 PRE-EMPLOYMENT DRUG SCREENING: ICD-10-CM

## 2024-10-11 LAB
AMP AMPHETAMINE: NORMAL
COC COCAINE: NORMAL
INT CON NEG: NORMAL
INT CON POS: NORMAL
MET METHAMPHETAMINES: NORMAL
OPI OPIATES: NORMAL
PCP PHENCYCLIDINE: NORMAL
POC DRUG COMMENT 753798-OCCUPATIONAL HEALTH: NORMAL
THC: NORMAL

## 2024-10-11 PROCEDURE — 80305 DRUG TEST PRSMV DIR OPT OBS: CPT | Performed by: FAMILY MEDICINE

## 2024-12-06 ENCOUNTER — OFFICE VISIT (OUTPATIENT)
Dept: URGENT CARE | Facility: CLINIC | Age: 39
End: 2024-12-06

## 2024-12-06 VITALS
OXYGEN SATURATION: 96 % | DIASTOLIC BLOOD PRESSURE: 82 MMHG | WEIGHT: 192.6 LBS | HEART RATE: 102 BPM | BODY MASS INDEX: 29.19 KG/M2 | TEMPERATURE: 97.6 F | HEIGHT: 68 IN | RESPIRATION RATE: 16 BRPM | SYSTOLIC BLOOD PRESSURE: 130 MMHG

## 2024-12-06 DIAGNOSIS — Z02.1 PRE-EMPLOYMENT EXAMINATION: ICD-10-CM

## 2024-12-06 DIAGNOSIS — Z02.1 PRE-EMPLOYMENT DRUG SCREENING: ICD-10-CM

## 2024-12-06 LAB
AMP AMPHETAMINE: NORMAL
COC COCAINE: NORMAL
INT CON NEG: NEGATIVE
INT CON POS: NORMAL
MET METHAMPHETAMINES: NORMAL
OPI OPIATES: NORMAL
PCP PHENCYCLIDINE: NORMAL
POC DRUG COMMENT 753798-OCCUPATIONAL HEALTH: NEGATIVE
THC: NORMAL

## 2024-12-06 PROCEDURE — 7101 PR PHYSICAL: Performed by: NURSE PRACTITIONER

## 2024-12-06 PROCEDURE — 80305 DRUG TEST PRSMV DIR OPT OBS: CPT | Performed by: NURSE PRACTITIONER

## 2024-12-06 ASSESSMENT — ENCOUNTER SYMPTOMS
CHILLS: 0
FEVER: 0

## 2024-12-06 ASSESSMENT — FIBROSIS 4 INDEX: FIB4 SCORE: 0.44

## 2024-12-06 NOTE — PROGRESS NOTES
Subjective     Олег Ramirez is a 39 y.o. 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   Олег has come into clinic for preemployment physical. See scanned sports physical and health questionnaire. No PMH/FH congenital cardiac problems or early cardiac death before age of 50. Denies shortness of breath, chest pain or dizziness on exertion. Denies h/o asthma, seizures, headaches, head traumas/concussions. No h/o rashes/eczema. Takes omeprazole for GERD. Exam normal. No trauma, injury or surgeries. Does not wear corrective glasses/lens.    PMH:  has a past medical history of GERD (gastroesophageal reflux disease) and Migraine.  MEDS:   Current Outpatient Medications:     omeprazole (PRILOSEC) 20 MG delayed-release capsule, Take 40 mg by mouth every evening., Disp: , Rfl:     asa/apap/caffeine (EXCEDRIN) 250-250-65 MG Tab, Take 2 Tablets by mouth every 6 hours as needed for Headache., Disp: , Rfl:     chlorhexidine (PERIDEX) 0.12 % Solution, 15 mL rinse x 60 sec then spit BID x 7 days (Patient not taking: Reported on 12/6/2024), Disp: 118 mL, Rfl: 0    naproxen (NAPROSYN) 500 MG Tab, Take 1 Tablet by mouth 2 times a day with meals. (Patient not taking: Reported on 12/6/2024), Disp: 10 Tablet, Rfl: 0    metoprolol tartrate (LOPRESSOR) 50 MG Tab, Take 1 Tablet by mouth 2 times a day. (Patient not taking: Reported on 12/6/2024), Disp: 60 Tablet, Rfl: 1  ALLERGIES: No Known Allergies  SURGHX:   Past Surgical History:   Procedure Laterality Date    APPENDECTOMY LAPAROSCOPIC  9/14/2018    Procedure: APPENDECTOMY LAPAROSCOPIC;  Surgeon: Baljit Mondragon M.D.;  Location: SURGERY [facility];  Service: General    APPENDECTOMY       SOCHX:  reports that he quit smoking about 3 years ago. His smoking use included cigarettes. He has never used smokeless tobacco. He reports current alcohol use. He  "reports that he does not use drugs.  FH: Family history was reviewed, no pertinent findings to report      Review of Systems   Constitutional:  Negative for chills, fever and malaise/fatigue.   All other systems reviewed and are negative.             Objective     /82   Pulse (!) 102   Temp 36.4 °C (97.6 °F) (Temporal)   Resp 16   Ht 1.727 m (5' 8\")   Wt 87.4 kg (192 lb 9.6 oz)   SpO2 96%   BMI 29.28 kg/m²      Physical Exam  Vitals reviewed.   Constitutional:       General: He is awake. He is not in acute distress.     Appearance: Normal appearance.   Cardiovascular:      Rate and Rhythm: Regular rhythm. Tachycardia present.      Heart sounds: Normal heart sounds, S1 normal and S2 normal. Heart sounds not distant. No murmur heard.     No friction rub. No gallop.   Pulmonary:      Effort: Pulmonary effort is normal.      Breath sounds: Normal breath sounds and air entry.   Skin:     General: Skin is warm and dry.   Neurological:      Mental Status: He is alert and oriented to person, place, and time.   Psychiatric:         Attention and Perception: Attention normal.         Mood and Affect: Mood normal.         Speech: Speech normal.         Behavior: Behavior normal. Behavior is cooperative.                             Assessment & Plan        Assessment & Plan  Pre-employment drug screening    Orders:    POCT 6 Panel Urine Drug Screen    Pre-employment examination                       "

## 2025-01-28 ENCOUNTER — OFFICE VISIT (OUTPATIENT)
Dept: URGENT CARE | Facility: PHYSICIAN GROUP | Age: 40
End: 2025-01-28
Payer: MEDICAID

## 2025-01-28 VITALS
TEMPERATURE: 98.2 F | WEIGHT: 196 LBS | DIASTOLIC BLOOD PRESSURE: 62 MMHG | RESPIRATION RATE: 16 BRPM | BODY MASS INDEX: 29.7 KG/M2 | SYSTOLIC BLOOD PRESSURE: 118 MMHG | HEART RATE: 92 BPM | HEIGHT: 68 IN | OXYGEN SATURATION: 97 %

## 2025-01-28 DIAGNOSIS — K04.7 CHRONIC DENTAL INFECTION: ICD-10-CM

## 2025-01-28 PROCEDURE — 99213 OFFICE O/P EST LOW 20 MIN: CPT | Performed by: NURSE PRACTITIONER

## 2025-01-28 PROCEDURE — 3074F SYST BP LT 130 MM HG: CPT | Performed by: NURSE PRACTITIONER

## 2025-01-28 PROCEDURE — 3078F DIAST BP <80 MM HG: CPT | Performed by: NURSE PRACTITIONER

## 2025-01-28 RX ORDER — KETOROLAC TROMETHAMINE 15 MG/ML
15 INJECTION, SOLUTION INTRAMUSCULAR; INTRAVENOUS ONCE
Status: COMPLETED | OUTPATIENT
Start: 2025-01-28 | End: 2025-01-28

## 2025-01-28 RX ORDER — CHLORHEXIDINE GLUCONATE ORAL RINSE 1.2 MG/ML
5 SOLUTION DENTAL 2 TIMES DAILY
Qty: 300 ML | Refills: 3 | Status: SHIPPED | OUTPATIENT
Start: 2025-01-28 | End: 2025-05-26

## 2025-01-28 RX ORDER — HYDROCODONE BITARTRATE AND ACETAMINOPHEN 5; 325 MG/1; MG/1
1 TABLET ORAL EVERY 4 HOURS PRN
Qty: 10 TABLET | Refills: 0 | Status: SHIPPED | OUTPATIENT
Start: 2025-01-28 | End: 2025-01-31

## 2025-01-28 RX ADMIN — KETOROLAC TROMETHAMINE 15 MG: 15 INJECTION, SOLUTION INTRAMUSCULAR; INTRAVENOUS at 10:06

## 2025-01-28 ASSESSMENT — FIBROSIS 4 INDEX: FIB4 SCORE: 0.44

## 2025-01-28 NOTE — PROGRESS NOTES
"Subjective:     Олег Ramirez is a 39 y.o. male who presents for Mouth Pain (Tooth pain x few weeks. Pt states the pain is going into his throat now)      HPI  Pt presents for evaluation of a new problem.  Олег is a pleasant 39-year-old male who presents to urgent care today with complaints of severe left lower molar dental pain and drainage.  This has been an ongoing problem and he notes that his tooth has been loose.  He was seen in urgent care on 10/3/2024 for the same issue.  At this time he was prescribed Augmentin, Peridex mouthwash and naproxen for pain.  Patient states that he has not followed up with dentistry.    ROS    PMH:   Past Medical History:   Diagnosis Date    GERD (gastroesophageal reflux disease)     Migraine      ALLERGIES: No Known Allergies  SURGHX:   Past Surgical History:   Procedure Laterality Date    APPENDECTOMY LAPAROSCOPIC  9/14/2018    Procedure: APPENDECTOMY LAPAROSCOPIC;  Surgeon: Baljit Mondragon M.D.;  Location: SURGERY [facility];  Service: General    APPENDECTOMY       SOCHX:   Social History     Socioeconomic History    Marital status: Single   Tobacco Use    Smoking status: Former     Current packs/day: 0.00     Types: Cigarettes     Quit date: 3/23/2021     Years since quitting: 3.8    Smokeless tobacco: Never    Tobacco comments:     5 cigs a day, pack a week previously.    Vaping Use    Vaping status: Every Day    Substances: Nicotine   Substance and Sexual Activity    Alcohol use: Yes     Comment: occasionally    Drug use: No    Sexual activity: Yes     Partners: Female     Birth control/protection: Condom     FH:   Family History   Problem Relation Age of Onset    Cancer Paternal Grandfather         esophageal    No Known Problems Mother     Diabetes Father     Sleep Apnea Maternal Grandmother     Sleep Apnea Paternal Grandmother          Objective:   /62   Pulse 92   Temp 36.8 °C (98.2 °F) (Temporal)   Resp 16   Ht 1.727 m (5' 8\")   Wt 88.9 kg " (196 lb) Comment: pt states wearing steel toe boots  SpO2 97%   BMI 29.80 kg/m²     Physical Exam  Vitals and nursing note reviewed.   Constitutional:       General: He is not in acute distress.     Appearance: Normal appearance. He is normal weight. He is not ill-appearing or toxic-appearing.   HENT:      Head: Normocephalic.      Right Ear: External ear normal.      Left Ear: External ear normal.      Nose: Nose normal.      Mouth/Throat:      Mouth: Mucous membranes are moist.      Dentition: Abnormal dentition. Dental tenderness, gingival swelling, dental caries and dental abscesses present.   Eyes:      General:         Right eye: No discharge.         Left eye: No discharge.      Extraocular Movements: Extraocular movements intact.      Conjunctiva/sclera: Conjunctivae normal.      Pupils: Pupils are equal, round, and reactive to light.   Cardiovascular:      Rate and Rhythm: Normal rate and regular rhythm.   Pulmonary:      Effort: Pulmonary effort is normal.      Breath sounds: Normal breath sounds.   Abdominal:      General: Abdomen is flat.   Musculoskeletal:         General: Normal range of motion.      Cervical back: Normal range of motion and neck supple. No rigidity.   Lymphadenopathy:      Cervical: No cervical adenopathy.   Skin:     General: Skin is warm and dry.   Neurological:      General: No focal deficit present.      Mental Status: He is alert and oriented to person, place, and time. Mental status is at baseline.   Psychiatric:         Mood and Affect: Mood normal.         Behavior: Behavior normal.         Judgment: Judgment normal.         Assessment/Plan:   Assessment      1. Chronic dental infection  ketorolac (Toradol) 15 MG/ML injection 15 mg    HYDROcodone-acetaminophen (NORCO) 5-325 MG Tab per tablet    amoxicillin-clavulanate (AUGMENTIN) 875-125 MG Tab    chlorhexidine (PERIDEX) 0.12 % Solution         reviewed and I do not believe that he is at an increased risk for abuse of  pain medication.  He was prescribed Norco 5/325 for relief of severe pain.  Side effects of medication discussed with patient and he was encouraged not to drive or attend work with use. Toradol injection given in clinic and pt tolerated this well. Pt was advised to refrain from additional NSAIDS for the next 48 hours following this injection. Acetaminophen may be used every 4 hours as needed for additional relief of pain. Pt educated not to exceed more than advised amount on bottle.   Patient was also started on Augmentin for treatment of infection and refill of Peridex mouthwash given.  Patient to swish and spit following each meal.  Urgent follow-up with dentistry advised.

## 2025-01-28 NOTE — LETTER
January 28, 2025    To Whom It May Concern:         This is confirmation that Олегchristine Menjivar James attended his scheduled appointment with GLORIA Rivera on 1/28/25. Please excuse his absence due to an acute condition. He may return on 2/1/2025 or sooner if better.          If you have any questions please do not hesitate to call me at the phone number listed below.    Sincerely,          LYNDSEY Rivera.  [phone]

## 2025-02-18 ENCOUNTER — HOSPITAL ENCOUNTER (EMERGENCY)
Facility: MEDICAL CENTER | Age: 40
End: 2025-02-18
Attending: EMERGENCY MEDICINE
Payer: MEDICAID

## 2025-02-18 VITALS
HEART RATE: 102 BPM | OXYGEN SATURATION: 94 % | WEIGHT: 192.02 LBS | HEIGHT: 68 IN | BODY MASS INDEX: 29.1 KG/M2 | DIASTOLIC BLOOD PRESSURE: 85 MMHG | SYSTOLIC BLOOD PRESSURE: 135 MMHG | RESPIRATION RATE: 16 BRPM | TEMPERATURE: 97.5 F

## 2025-02-18 DIAGNOSIS — K08.89 TOOTH PAIN: ICD-10-CM

## 2025-02-18 DIAGNOSIS — K05.10 GINGIVITIS: ICD-10-CM

## 2025-02-18 DIAGNOSIS — G89.29 CHRONIC DENTAL PAIN: ICD-10-CM

## 2025-02-18 DIAGNOSIS — K08.9 CHRONIC DENTAL PAIN: ICD-10-CM

## 2025-02-18 PROCEDURE — 99282 EMERGENCY DEPT VISIT SF MDM: CPT

## 2025-02-18 RX ORDER — CHLORHEXIDINE GLUCONATE ORAL RINSE 1.2 MG/ML
SOLUTION DENTAL
Qty: 473 ML | Refills: 0 | Status: SHIPPED | OUTPATIENT
Start: 2025-02-18

## 2025-02-18 RX ORDER — NAPROXEN 500 MG/1
500 TABLET ORAL 2 TIMES DAILY WITH MEALS
Qty: 30 TABLET | Refills: 0 | Status: SHIPPED | OUTPATIENT
Start: 2025-02-18

## 2025-02-18 RX ORDER — AMOXICILLIN 500 MG/1
500 CAPSULE ORAL 3 TIMES DAILY
Qty: 21 CAPSULE | Refills: 0 | Status: ACTIVE | OUTPATIENT
Start: 2025-02-18 | End: 2025-02-25

## 2025-02-18 ASSESSMENT — FIBROSIS 4 INDEX: FIB4 SCORE: 0.44

## 2025-02-18 NOTE — DISCHARGE INSTRUCTIONS
You were seen in the Emergency Department for dental pain.    Please take anti-inflammatory twice daily scheduled for the next at least a week.  You may use an additional 1000 mg of Tylenol every 6 hours for pain.    Please follow up with dentist as soon as possible.    Return to the Emergency Department with fevers greater than 100.4, facial swelling, uncontrolled pain, or other concerns.

## 2025-02-18 NOTE — ED PROVIDER NOTES
ED Provider Note    CHIEF COMPLAINT  Chief Complaint   Patient presents with    Dental Pain     Left Lower Molar  Ongoing for extended period of time  Reports difficulty w/ Dentist r/t insurance     EXTERNAL RECORDS REVIEWED  Patient was seen in urgent care October 2024 for dental pain, at that time prescribed Augmentin, chlorhexidine and naproxen.  He was seen for similar issues on 1/28/2025 and given further medication    HPI/ROS  LIMITATION TO HISTORY   None  OUTSIDE HISTORIAN(S):  None    Олег Ramirez is a 39 y.o. male who presents to the Emergency Department with dental pain.  Patient states that he has had left lower molar pain that has been going on for many months.  He states that it waxes and wanes however over the last few days he has had worsening pain.  He went into see a dentist today who recommended tooth extraction and bone grafting however his Medicaid insurance would not cover the bone grafting and the dentist would not perform the extraction without the grafting therefore he is going to have to find another dental provider.  He denies any fevers or facial swelling.  He states he has been trying Excedrin at home for pain.    PAST MEDICAL HISTORY  Past Medical History:   Diagnosis Date    GERD (gastroesophageal reflux disease)     Migraine         SURGICAL HISTORY  Past Surgical History:   Procedure Laterality Date    APPENDECTOMY LAPAROSCOPIC  9/14/2018    Procedure: APPENDECTOMY LAPAROSCOPIC;  Surgeon: Baljit Mondragon M.D.;  Location: SURGERY [facility];  Service: General    APPENDECTOMY          FAMILY HISTORY  Family History   Problem Relation Age of Onset    Cancer Paternal Grandfather         esophageal    No Known Problems Mother     Diabetes Father     Sleep Apnea Maternal Grandmother     Sleep Apnea Paternal Grandmother        SOCIAL HISTORY   reports that he quit smoking about 3 years ago. His smoking use included cigarettes. He has never used smokeless tobacco. He reports  "current alcohol use. He reports that he does not use drugs.    CURRENT MEDICATIONS  Discharge Medication List as of 2/18/2025  3:40 PM        CONTINUE these medications which have NOT CHANGED    Details   !! chlorhexidine (PERIDEX) 0.12 % Solution Take 5 mL by mouth 2 times a day for 118 days., Disp-300 mL, R-3, Normal      metoprolol tartrate (LOPRESSOR) 50 MG Tab Take 1 Tablet by mouth 2 times a day., Disp-60 Tablet, R-1, Normal      omeprazole (PRILOSEC) 20 MG delayed-release capsule Take 40 mg by mouth every evening., Historical Med      asa/apap/caffeine (EXCEDRIN) 250-250-65 MG Tab Take 2 Tablets by mouth every 6 hours as needed for Headache., Historical Med       !! - Potential duplicate medications found. Please discuss with provider.          ALLERGIES  Patient has no known allergies.    PHYSICAL EXAM  /85   Pulse (!) 102   Temp 36.4 °C (97.5 °F) (Temporal)   Resp 16   Ht 1.727 m (5' 8\")   Wt 87.1 kg (192 lb 0.3 oz)   SpO2 94%      Constitutional: Nontoxic appearing. Alert in no apparent distress.  HENT: Normocephalic, Atraumatic.  Moist mucous membranes.  No mandibular fullness or trismus.  No facial swelling noted.  Left posterior molar with tenderness to percussion however no evidence of alveolar fullness or drainable dental abscess.  Neck: Supple, full range of motion  Musculoskeletal: Atraumatic. No obvious deformities noted.   Skin: Warm, Dry.  No erythema, No rash.   Neurologic: Alert and oriented x3. Moving all extremities spontaneously without focal deficits.  Psychiatric: Affect normal, Mood normal, Appears appropriate and not intoxicated.      DIAGNOSTIC STUDIES / PROCEDURES        COURSE & MEDICAL DECISION MAKING      ASSESSMENT, COURSE AND PLAN  Care Narrative: Young otherwise healthy patient who presents with ongoing left lower dental pain for the last few months.  He was seen at a dentist today who could not perform an extraction based on his insurance.  He is afebrile, mildly " tachycardic on arrival with otherwise reassuring vital signs.  I did consider labs and imaging  however there is no signs of infectious process such as Mitesh's angina, deep space abscess or even a drainable dental abscess.  Patient ultimately needs to get back into another dentist that can take care of this issue and extract the tooth.  Will provide with a refills of naproxen, antibiotics and Peridex mouthwash in the meantime. Patient understands plan of care and strict return precautions for changing or worsening symptoms.      ADDITIONAL PROBLEM LIST  Problem #1: Chronic dental pain - discharge with naproxen, Peridex mouthwash, amoxicillin, given dental resources      DISPOSITION AND DISCUSSIONS    Escalation of care considered, and ultimately not performed:Laboratory analysis and diagnostic imaging    Barriers to care at this time, including but not limited to: Patient does not have established PCP.     Decision tools and prescription drugs considered including, but not limited to: Pain Medications above medication should be sufficient .      DISPOSITION:  Patient will be discharged home in stable condition.    FOLLOW UP:  [facility]  [address]  [place] [address]  [phone]    Schedule an appointment as soon as possible for a visit       [facility], Emergency Dept  [address]  [place] [id number] [phone]    If symptoms worsen      OUTPATIENT MEDICATIONS:  Discharge Medication List as of 2/18/2025  3:40 PM        START taking these medications    Details   amoxicillin (AMOXIL) 500 MG Cap Take 1 Capsule by mouth 3 times a day for 7 days., Disp-21 Capsule, R-0, Normal               FINAL DIAGNOSIS  1. Chronic dental pain    2. Tooth pain    3. Gingivitis

## 2025-02-18 NOTE — Clinical Note
Олег Ramirez was seen and treated in our emergency department on 2/18/2025.  He may return to work on 02/20/2025.       If you have any questions or concerns, please don't hesitate to call.      Kaylen Kirby M.D.

## 2025-02-18 NOTE — ED TRIAGE NOTES
"Chief Complaint   Patient presents with    Dental Pain     Left Lower Molar  Ongoing for extended period of time  Reports difficulty w/ Dentist r/t insurance     /88   Pulse (!) 111   Temp 36.4 °C (97.5 °F) (Temporal)   Resp 14   Ht 1.727 m (5' 8\")   Wt 87.1 kg (192 lb 0.3 oz)   SpO2 93%   BMI 29.20 kg/m²     Pt ambulated to ED by self for c/o ongoing Left lower dental pain.   "

## 2025-02-18 NOTE — ED NOTES
Dc instructions and medications discussed with patient at bedside. All questions answered at this time. VSS. No IV in place at this time. Pt to lobby without incident. self to drive patient home.   Dental referral list given to patient.

## 2025-04-11 ENCOUNTER — NON-PROVIDER VISIT (OUTPATIENT)
Dept: URGENT CARE | Facility: PHYSICIAN GROUP | Age: 40
End: 2025-04-11

## 2025-04-11 DIAGNOSIS — Z02.83 ENCOUNTER FOR DRUG SCREENING: ICD-10-CM

## 2025-04-11 DIAGNOSIS — Z02.1 PRE-EMPLOYMENT DRUG SCREENING: ICD-10-CM

## 2025-04-11 PROCEDURE — 80305 DRUG TEST PRSMV DIR OPT OBS: CPT | Performed by: PHYSICIAN ASSISTANT

## 2025-05-19 ENCOUNTER — NON-PROVIDER VISIT (OUTPATIENT)
Dept: URGENT CARE | Facility: PHYSICIAN GROUP | Age: 40
End: 2025-05-19

## 2025-05-19 DIAGNOSIS — Z02.1 PRE-EMPLOYMENT DRUG SCREENING: Primary | ICD-10-CM

## 2025-05-19 PROCEDURE — 80305 DRUG TEST PRSMV DIR OPT OBS: CPT | Performed by: NURSE PRACTITIONER

## (undated) DEVICE — SUTURE 0 VICRYL PLUS CT-2 - 27 INCH (36/BX)

## (undated) DEVICE — SODIUM CHL IRRIGATION 0.9% 1000ML (12EA/CA)

## (undated) DEVICE — MASK ANESTHESIA ADULT  - (100/CA)

## (undated) DEVICE — ADHESIVE DERMABOND HVD MINI (12EA/BX)

## (undated) DEVICE — SUTURE 3-0 ETHILON FS-1 - (36/BX) 30 INCH

## (undated) DEVICE — SUTURE GENERAL

## (undated) DEVICE — CHLORAPREP 26 ML APPLICATOR - ORANGE TINT(25/CA)

## (undated) DEVICE — KIT ANESTHESIA W/CIRCUIT & 3/LT BAG W/FILTER (20EA/CA)

## (undated) DEVICE — PROTECTOR ULNA NERVE - (36PR/CA)

## (undated) DEVICE — NEEDLE INSFL 120MM 14GA VRRS - (20/BX)

## (undated) DEVICE — HUMID-VENT HEAT AND MOISTURE EXCHANGE- (50/BX)

## (undated) DEVICE — KIT ROOM DECONTAMINATION

## (undated) DEVICE — SENSOR SPO2 NEO LNCS ADHESIVE (20/BX) SEE USER NOTES

## (undated) DEVICE — GOWN WARMING STANDARD FLEX - (30/CA)

## (undated) DEVICE — GLOVE BIOGEL PI ORTHO SZ 7.5 PF LF (40PR/BX)

## (undated) DEVICE — TUBE CONNECTING SUCTION - CLEAR PLASTIC STERILE 72 IN (50EA/CA)

## (undated) DEVICE — ELECTRODE 850 FOAM ADHESIVE - HYDROGEL RADIOTRNSPRNT (50/PK)

## (undated) DEVICE — TROCAR LAPSCP 100MM 12MM NTHRD - (6/BX)

## (undated) DEVICE — HEAD HOLDER JUNIOR/ADULT

## (undated) DEVICE — GLOVE BIOGEL SZ 7.5 SURGICAL PF LTX - (50PR/BX 4BX/CA)

## (undated) DEVICE — SEALER VESSEL HARMONIC ACE PLUS WITH ADVANCED HEMOSTASIS 36CM (1/EA)

## (undated) DEVICE — STAPLE 45MM VASCULAR WHITE 2.5MM (12EA/BX)

## (undated) DEVICE — TUBING INSUFFLATION - (10/BX)

## (undated) DEVICE — DRAIN J-VAC 10MM FLAT - (10/CA)

## (undated) DEVICE — TROCAR 5X100 NON BLADED Z-TH - READ KII (6/BX)

## (undated) DEVICE — SUTURE 4-0 MONOCRYL PLUS PS-2 - 27 INCH (36/BX)

## (undated) DEVICE — CANISTER SUCTION RIGID RED 1500CC (40EA/CA)

## (undated) DEVICE — GLOVE, LITE (PAIR)

## (undated) DEVICE — GLOVE BIOGEL SZ 8 SURGICAL PF LTX - (50PR/BX 4BX/CA)

## (undated) DEVICE — ELECTRODE DUAL RETURN W/ CORD - (50/PK)

## (undated) DEVICE — RESERVOIR SUCTION 100 CC - SILICONE (20EA/CA)

## (undated) DEVICE — CANNULA W/SEAL 5X100 Z-THRE - ADED KII (12/BX)

## (undated) DEVICE — TUBE E-T HI-LO CUFF 7.5MM (10EA/PK)

## (undated) DEVICE — WATER IRRIGATION STERILE 1000ML (12EA/CA)

## (undated) DEVICE — Device

## (undated) DEVICE — TROCAR Z THREAD12MM OPTICAL - NON BLADED (6/BX)

## (undated) DEVICE — NEPTUNE 4 PORT MANIFOLD - (20/PK)

## (undated) DEVICE — ELECTRODE 5MM LHK LAPSCP STERILE DISP- MEGADYNE  (5/CA)

## (undated) DEVICE — SET SUCTION/IRRIGATION WITH DISPOSABLE TIP (6/CA )PART #0250-070-520 IS A SUB

## (undated) DEVICE — BAG, SPONGE COUNT 50600

## (undated) DEVICE — SUCTION INSTRUMENT YANKAUER BULBOUS TIP W/O VENT (50EA/CA)

## (undated) DEVICE — BAG RETRIEVAL 10ML (10EA/BX)

## (undated) DEVICE — STAPLER 45MM ARTICULATING - ENDO (3EA/BX)

## (undated) DEVICE — DERMABOND ADVANCED - (12EA/BX)